# Patient Record
Sex: FEMALE | Race: WHITE | NOT HISPANIC OR LATINO | Employment: OTHER | URBAN - METROPOLITAN AREA
[De-identification: names, ages, dates, MRNs, and addresses within clinical notes are randomized per-mention and may not be internally consistent; named-entity substitution may affect disease eponyms.]

---

## 2023-12-31 ENCOUNTER — HOSPITAL ENCOUNTER (INPATIENT)
Facility: CLINIC | Age: 63
LOS: 5 days | Discharge: HOME | DRG: 330 | End: 2024-01-05
Attending: EMERGENCY MEDICINE | Admitting: INTERNAL MEDICINE
Payer: MEDICARE

## 2023-12-31 ENCOUNTER — APPOINTMENT (OUTPATIENT)
Dept: CT IMAGING | Facility: CLINIC | Age: 63
DRG: 330 | End: 2023-12-31
Attending: EMERGENCY MEDICINE
Payer: MEDICARE

## 2023-12-31 ENCOUNTER — APPOINTMENT (OUTPATIENT)
Dept: GENERAL RADIOLOGY | Facility: CLINIC | Age: 63
DRG: 330 | End: 2023-12-31
Attending: EMERGENCY MEDICINE
Payer: MEDICARE

## 2023-12-31 DIAGNOSIS — E27.9 ADRENAL NODULE (H): ICD-10-CM

## 2023-12-31 DIAGNOSIS — G89.18 POSTOPERATIVE PAIN: Primary | ICD-10-CM

## 2023-12-31 DIAGNOSIS — N63.20 MASS OF LEFT BREAST, UNSPECIFIED QUADRANT: ICD-10-CM

## 2023-12-31 DIAGNOSIS — I10 HYPERTENSION GOAL BP (BLOOD PRESSURE) < 140/80: ICD-10-CM

## 2023-12-31 DIAGNOSIS — K56.609 SBO (SMALL BOWEL OBSTRUCTION) (H): ICD-10-CM

## 2023-12-31 PROBLEM — E87.1 HYPONATREMIA: Status: ACTIVE | Noted: 2023-12-31

## 2023-12-31 PROBLEM — F17.200 TOBACCO USE DISORDER: Status: ACTIVE | Noted: 2023-12-31

## 2023-12-31 PROBLEM — D72.829 LEUKOCYTOSIS: Status: ACTIVE | Noted: 2023-12-31

## 2023-12-31 LAB
ALBUMIN SERPL BCG-MCNC: 4.5 G/DL (ref 3.5–5.2)
ALP SERPL-CCNC: 83 U/L (ref 40–150)
ALT SERPL W P-5'-P-CCNC: 23 U/L (ref 0–50)
ANION GAP SERPL CALCULATED.3IONS-SCNC: 19 MMOL/L (ref 7–15)
AST SERPL W P-5'-P-CCNC: 17 U/L (ref 0–45)
BASOPHILS # BLD AUTO: 0 10E3/UL (ref 0–0.2)
BASOPHILS NFR BLD AUTO: 0 %
BILIRUB SERPL-MCNC: 0.9 MG/DL
BUN SERPL-MCNC: 26.2 MG/DL (ref 8–23)
CALCIUM SERPL-MCNC: 9.7 MG/DL (ref 8.8–10.2)
CHLORIDE SERPL-SCNC: 96 MMOL/L (ref 98–107)
CREAT SERPL-MCNC: 0.92 MG/DL (ref 0.51–0.95)
DEPRECATED HCO3 PLAS-SCNC: 18 MMOL/L (ref 22–29)
EGFRCR SERPLBLD CKD-EPI 2021: 69 ML/MIN/1.73M2
EOSINOPHIL # BLD AUTO: 0 10E3/UL (ref 0–0.7)
EOSINOPHIL NFR BLD AUTO: 0 %
ERYTHROCYTE [DISTWIDTH] IN BLOOD BY AUTOMATED COUNT: 12.3 % (ref 10–15)
GLUCOSE SERPL-MCNC: 162 MG/DL (ref 70–99)
HCT VFR BLD AUTO: 47.3 % (ref 35–47)
HGB BLD-MCNC: 16.1 G/DL (ref 11.7–15.7)
IMM GRANULOCYTES # BLD: 0.2 10E3/UL
IMM GRANULOCYTES NFR BLD: 1 %
LACTATE SERPL-SCNC: 1.1 MMOL/L (ref 0.7–2)
LIPASE SERPL-CCNC: 18 U/L (ref 13–60)
LYMPHOCYTES # BLD AUTO: 2 10E3/UL (ref 0.8–5.3)
LYMPHOCYTES NFR BLD AUTO: 10 %
MAGNESIUM SERPL-MCNC: 2.2 MG/DL (ref 1.7–2.3)
MCH RBC QN AUTO: 32.9 PG (ref 26.5–33)
MCHC RBC AUTO-ENTMCNC: 34 G/DL (ref 31.5–36.5)
MCV RBC AUTO: 97 FL (ref 78–100)
MONOCYTES # BLD AUTO: 1 10E3/UL (ref 0–1.3)
MONOCYTES NFR BLD AUTO: 5 %
NEUTROPHILS # BLD AUTO: 16.3 10E3/UL (ref 1.6–8.3)
NEUTROPHILS NFR BLD AUTO: 84 %
NRBC # BLD AUTO: 0 10E3/UL
NRBC BLD AUTO-RTO: 0 /100
PLATELET # BLD AUTO: 255 10E3/UL (ref 150–450)
POTASSIUM SERPL-SCNC: 4.3 MMOL/L (ref 3.4–5.3)
PROT SERPL-MCNC: 8.3 G/DL (ref 6.4–8.3)
RBC # BLD AUTO: 4.9 10E6/UL (ref 3.8–5.2)
SODIUM SERPL-SCNC: 133 MMOL/L (ref 135–145)
WBC # BLD AUTO: 19.5 10E3/UL (ref 4–11)

## 2023-12-31 PROCEDURE — 258N000003 HC RX IP 258 OP 636: Performed by: NURSE PRACTITIONER

## 2023-12-31 PROCEDURE — 120N000001 HC R&B MED SURG/OB

## 2023-12-31 PROCEDURE — 999N000065 XR CHEST PORT 1 VIEW

## 2023-12-31 PROCEDURE — 83605 ASSAY OF LACTIC ACID: CPT | Performed by: EMERGENCY MEDICINE

## 2023-12-31 PROCEDURE — 83690 ASSAY OF LIPASE: CPT | Performed by: EMERGENCY MEDICINE

## 2023-12-31 PROCEDURE — 250N000011 HC RX IP 250 OP 636: Performed by: EMERGENCY MEDICINE

## 2023-12-31 PROCEDURE — 96375 TX/PRO/DX INJ NEW DRUG ADDON: CPT | Performed by: EMERGENCY MEDICINE

## 2023-12-31 PROCEDURE — 250N000011 HC RX IP 250 OP 636: Performed by: NURSE PRACTITIONER

## 2023-12-31 PROCEDURE — 85025 COMPLETE CBC W/AUTO DIFF WBC: CPT | Performed by: EMERGENCY MEDICINE

## 2023-12-31 PROCEDURE — 99222 1ST HOSP IP/OBS MODERATE 55: CPT | Performed by: SURGERY

## 2023-12-31 PROCEDURE — 96361 HYDRATE IV INFUSION ADD-ON: CPT | Performed by: EMERGENCY MEDICINE

## 2023-12-31 PROCEDURE — 96374 THER/PROPH/DIAG INJ IV PUSH: CPT | Mod: 59 | Performed by: EMERGENCY MEDICINE

## 2023-12-31 PROCEDURE — 83735 ASSAY OF MAGNESIUM: CPT | Performed by: NURSE PRACTITIONER

## 2023-12-31 PROCEDURE — 82040 ASSAY OF SERUM ALBUMIN: CPT | Performed by: EMERGENCY MEDICINE

## 2023-12-31 PROCEDURE — 250N000009 HC RX 250: Performed by: EMERGENCY MEDICINE

## 2023-12-31 PROCEDURE — 96376 TX/PRO/DX INJ SAME DRUG ADON: CPT | Performed by: EMERGENCY MEDICINE

## 2023-12-31 PROCEDURE — 99285 EMERGENCY DEPT VISIT HI MDM: CPT | Mod: 25 | Performed by: EMERGENCY MEDICINE

## 2023-12-31 PROCEDURE — 99285 EMERGENCY DEPT VISIT HI MDM: CPT | Performed by: EMERGENCY MEDICINE

## 2023-12-31 PROCEDURE — 36415 COLL VENOUS BLD VENIPUNCTURE: CPT | Performed by: EMERGENCY MEDICINE

## 2023-12-31 PROCEDURE — 258N000003 HC RX IP 258 OP 636: Performed by: EMERGENCY MEDICINE

## 2023-12-31 PROCEDURE — 99222 1ST HOSP IP/OBS MODERATE 55: CPT | Mod: AI | Performed by: NURSE PRACTITIONER

## 2023-12-31 PROCEDURE — 74177 CT ABD & PELVIS W/CONTRAST: CPT | Mod: MG

## 2023-12-31 RX ORDER — ONDANSETRON 2 MG/ML
4 INJECTION INTRAMUSCULAR; INTRAVENOUS EVERY 30 MIN PRN
Status: DISCONTINUED | OUTPATIENT
Start: 2023-12-31 | End: 2023-12-31

## 2023-12-31 RX ORDER — AMOXICILLIN 250 MG
2 CAPSULE ORAL 2 TIMES DAILY PRN
Status: DISCONTINUED | OUTPATIENT
Start: 2023-12-31 | End: 2024-01-02

## 2023-12-31 RX ORDER — PROCHLORPERAZINE MALEATE 5 MG
5 TABLET ORAL EVERY 6 HOURS PRN
Status: DISCONTINUED | OUTPATIENT
Start: 2023-12-31 | End: 2024-01-05 | Stop reason: HOSPADM

## 2023-12-31 RX ORDER — HYDRALAZINE HYDROCHLORIDE 10 MG/1
10 TABLET, FILM COATED ORAL EVERY 4 HOURS PRN
Status: DISCONTINUED | OUTPATIENT
Start: 2023-12-31 | End: 2024-01-02

## 2023-12-31 RX ORDER — ONDANSETRON 2 MG/ML
4 INJECTION INTRAMUSCULAR; INTRAVENOUS EVERY 6 HOURS PRN
Status: DISCONTINUED | OUTPATIENT
Start: 2023-12-31 | End: 2024-01-05 | Stop reason: HOSPADM

## 2023-12-31 RX ORDER — AMOXICILLIN 250 MG
1 CAPSULE ORAL 2 TIMES DAILY PRN
Status: DISCONTINUED | OUTPATIENT
Start: 2023-12-31 | End: 2024-01-02

## 2023-12-31 RX ORDER — HYDRALAZINE HYDROCHLORIDE 20 MG/ML
10 INJECTION INTRAMUSCULAR; INTRAVENOUS EVERY 4 HOURS PRN
Status: DISCONTINUED | OUTPATIENT
Start: 2023-12-31 | End: 2024-01-02

## 2023-12-31 RX ORDER — NALOXONE HYDROCHLORIDE 0.4 MG/ML
0.2 INJECTION, SOLUTION INTRAMUSCULAR; INTRAVENOUS; SUBCUTANEOUS
Status: DISCONTINUED | OUTPATIENT
Start: 2023-12-31 | End: 2024-01-05 | Stop reason: HOSPADM

## 2023-12-31 RX ORDER — CALCIUM CARBONATE 500 MG/1
1000 TABLET, CHEWABLE ORAL 4 TIMES DAILY PRN
Status: DISCONTINUED | OUTPATIENT
Start: 2023-12-31 | End: 2024-01-05 | Stop reason: HOSPADM

## 2023-12-31 RX ORDER — ENOXAPARIN SODIUM 100 MG/ML
40 INJECTION SUBCUTANEOUS EVERY 24 HOURS
Status: DISCONTINUED | OUTPATIENT
Start: 2023-12-31 | End: 2024-01-01

## 2023-12-31 RX ORDER — SODIUM CHLORIDE 9 MG/ML
INJECTION, SOLUTION INTRAVENOUS CONTINUOUS
Status: DISCONTINUED | OUTPATIENT
Start: 2023-12-31 | End: 2024-01-02

## 2023-12-31 RX ORDER — NALOXONE HYDROCHLORIDE 0.4 MG/ML
0.4 INJECTION, SOLUTION INTRAMUSCULAR; INTRAVENOUS; SUBCUTANEOUS
Status: DISCONTINUED | OUTPATIENT
Start: 2023-12-31 | End: 2024-01-05 | Stop reason: HOSPADM

## 2023-12-31 RX ORDER — HYDROMORPHONE HCL IN WATER/PF 6 MG/30 ML
0.2 PATIENT CONTROLLED ANALGESIA SYRINGE INTRAVENOUS
Status: DISCONTINUED | OUTPATIENT
Start: 2023-12-31 | End: 2024-01-02

## 2023-12-31 RX ORDER — LIDOCAINE 40 MG/G
CREAM TOPICAL
Status: DISCONTINUED | OUTPATIENT
Start: 2023-12-31 | End: 2024-01-02

## 2023-12-31 RX ORDER — PROCHLORPERAZINE 25 MG
12.5 SUPPOSITORY, RECTAL RECTAL EVERY 12 HOURS PRN
Status: DISCONTINUED | OUTPATIENT
Start: 2023-12-31 | End: 2024-01-05 | Stop reason: HOSPADM

## 2023-12-31 RX ORDER — ONDANSETRON 4 MG/1
4 TABLET, ORALLY DISINTEGRATING ORAL EVERY 6 HOURS PRN
Status: DISCONTINUED | OUTPATIENT
Start: 2023-12-31 | End: 2024-01-05 | Stop reason: HOSPADM

## 2023-12-31 RX ORDER — HYDROMORPHONE HYDROCHLORIDE 1 MG/ML
0.5 INJECTION, SOLUTION INTRAMUSCULAR; INTRAVENOUS; SUBCUTANEOUS EVERY 30 MIN PRN
Status: COMPLETED | OUTPATIENT
Start: 2023-12-31 | End: 2023-12-31

## 2023-12-31 RX ORDER — HYDROMORPHONE HCL IN WATER/PF 6 MG/30 ML
0.4 PATIENT CONTROLLED ANALGESIA SYRINGE INTRAVENOUS
Status: DISCONTINUED | OUTPATIENT
Start: 2023-12-31 | End: 2024-01-02

## 2023-12-31 RX ORDER — OXYMETAZOLINE HYDROCHLORIDE 0.05 G/100ML
2 SPRAY NASAL
Status: COMPLETED | OUTPATIENT
Start: 2023-12-31 | End: 2023-12-31

## 2023-12-31 RX ORDER — IOPAMIDOL 755 MG/ML
500 INJECTION, SOLUTION INTRAVASCULAR ONCE
Status: COMPLETED | OUTPATIENT
Start: 2023-12-31 | End: 2023-12-31

## 2023-12-31 RX ADMIN — HYDROMORPHONE HYDROCHLORIDE 0.5 MG: 1 INJECTION, SOLUTION INTRAMUSCULAR; INTRAVENOUS; SUBCUTANEOUS at 10:42

## 2023-12-31 RX ADMIN — IOPAMIDOL 55 ML: 755 INJECTION, SOLUTION INTRAVENOUS at 11:11

## 2023-12-31 RX ADMIN — SODIUM CHLORIDE: 9 INJECTION, SOLUTION INTRAVENOUS at 13:40

## 2023-12-31 RX ADMIN — SODIUM CHLORIDE 70 ML: 9 INJECTION, SOLUTION INTRAVENOUS at 11:11

## 2023-12-31 RX ADMIN — OXYMETAZOLINE HYDROCHLORIDE 2 SPRAY: 0.05 SPRAY NASAL at 13:00

## 2023-12-31 RX ADMIN — SODIUM CHLORIDE 1000 ML: 9 INJECTION, SOLUTION INTRAVENOUS at 10:40

## 2023-12-31 RX ADMIN — HYDROMORPHONE HYDROCHLORIDE 0.2 MG: 0.2 INJECTION, SOLUTION INTRAMUSCULAR; INTRAVENOUS; SUBCUTANEOUS at 22:53

## 2023-12-31 RX ADMIN — HYDROMORPHONE HYDROCHLORIDE 0.2 MG: 0.2 INJECTION, SOLUTION INTRAMUSCULAR; INTRAVENOUS; SUBCUTANEOUS at 18:02

## 2023-12-31 RX ADMIN — SODIUM CHLORIDE 1000 ML: 9 INJECTION, SOLUTION INTRAVENOUS at 12:42

## 2023-12-31 RX ADMIN — ONDANSETRON 4 MG: 2 INJECTION INTRAMUSCULAR; INTRAVENOUS at 10:43

## 2023-12-31 RX ADMIN — HYDROMORPHONE HYDROCHLORIDE 0.5 MG: 1 INJECTION, SOLUTION INTRAMUSCULAR; INTRAVENOUS; SUBCUTANEOUS at 15:04

## 2023-12-31 RX ADMIN — SODIUM CHLORIDE: 9 INJECTION, SOLUTION INTRAVENOUS at 22:46

## 2023-12-31 RX ADMIN — ENOXAPARIN SODIUM 40 MG: 40 INJECTION SUBCUTANEOUS at 16:44

## 2023-12-31 RX ADMIN — HYDROMORPHONE HYDROCHLORIDE 0.5 MG: 1 INJECTION, SOLUTION INTRAMUSCULAR; INTRAVENOUS; SUBCUTANEOUS at 12:59

## 2023-12-31 ASSESSMENT — ACTIVITIES OF DAILY LIVING (ADL)
ADLS_ACUITY_SCORE: 35
ADLS_ACUITY_SCORE: 18
ADLS_ACUITY_SCORE: 18
ADLS_ACUITY_SCORE: 35
ADLS_ACUITY_SCORE: 18
ADLS_ACUITY_SCORE: 35
ADLS_ACUITY_SCORE: 18

## 2023-12-31 NOTE — H&P
[facility] ED    History and Physical - Hospitalist Service       Date of Admission:  12/31/2023    Assessment & Plan      Jinny Smith is a 63 year old female admitted on 12/31/2023. She has a past medical history of hypertension, hyperlipidemia-although has not been on medications for several years.  Also has a history of hysterectomy.    She presented to the ED on 12/31/2023 with 4-day history of abdominal pain, cramping.  She had only taken sips of water since 12/28, but then this morning started vomiting.  Last bowel movement Friday 12/29 and it was normal.    Workup in the ED included CT of abdomen and pelvis.  Showing acute mechanical small bowel obstruction with transition point in the right lower quadrant pelvis.  Also noted to have indeterminate left adrenal nodule and indeterminate left anterior breast mass.  Lab work indicated mild hyponatremia with sodium of 133, elevated anion gap at 19, BUN elevated at 26.2, elevated WBC at 19.5, with an elevated hemoglobin of 16.1.  Lactic acid normal at 1.1    She is being admitted as an inpatient due to an acute small bowel obstruction.    SBO (small bowel obstruction) (H)    Assessment: Scented with a 4-day history of abdominal pain and cramping.  Gradually worsening.  With vomiting this morning.  CT of abdomen and pelvis indicates acute mechanical bowel obstruction.  Surgery consulted by the ED provider.  NG tube placed, recommend n.p.o.  With surgery to follow-up    Plan:   -N.p.o.  -IV normal saline at 125 cc an hour  -NG tube to low intermittent suction  -Surgical consult  -Hydromorphone as needed for pain  -Diabetics for nausea and vomiting  Hypertension goal BP (blood pressure) < 140/80    Assessment: Has a history of hypertension, but currently not taking any medications.  Has not followed up with primary care provider for several years.  Initially in the ED blood pressure elevated at 179/123, with an elevated pulse.  With  IV fluids and pain management and this has improved to 142/69, pulse 84.    Plan: Monitor blood pressure  Hydralazine as needed for systolic blood pressure greater than 180  H/O: hysterectomy    Assessment: History of hysterectomy    Tobacco use disorder    Assessment: Patient reports she smokes a half a pack or less of cigarettes a day.  She has not smoked since 12/28.  Has not noted any symptoms of acute nicotine withdrawal    Plan: Continue to monitor, consider nicotine replacement if needed    Leukocytosis    Assessment: On admission WBC 19.5.  Patient has been afebrile without any other signs or symptoms of acute infection.  Suspect secondary to small bowel obstruction    Plan:   -Monitor for signs and symptoms of infection  -Recheck WBC in morning    Hyponatremia    Assessment: Sodium 134 on admission  -Likely hypovolemic  -Start normal saline IV fluids  -Repeat sodium in AM  -If not improving consider serum osmolality and urine sodium  Left adrenal nodule  Left anterior breast mass  Assessment: Did on CT scan  Plan: Follow-up as outpatient        Diet:  NPO  DVT Prophylaxis: Enoxaparin (Lovenox) SQ  Hanna Catheter: Not present  Lines: None     Cardiac Monitoring: None  Code Status:  Full Code    Clinically Significant Risk Factors Present on Admission             # Anion Gap Metabolic Acidosis: Highest Anion Gap = 19 mmol/L in last 2 days, will monitor and treat as appropriate      # Hypertension: Noted on problem list                 Disposition Plan      Expected Discharge Date: 01/02/2024                The patient's care was discussed with the Attending Physician, Dr. Patel and Patient.    Lelia Farr CNP  Hospitalist Service  [facility]  Securely message with [employer] (more info)  Text page via [facility] Paging/Directory     ______________________________________________________________________    Chief Complaint   Acute abdominal pain with vomiting    History is obtained from  the patient    History of Present Illness   Jinny Smith is a 63 year old female admitted on 12/31/2023. She has a past medical history of hypertension, hyperlipidemia-although has not been on medications for several years.  Also has a history of hysterectomy.    She presented to the ED on 12/31/2023 with 4-day history of abdominal pain, cramping.  She had only taken sips of water since 12/28, but then this morning started vomiting.  Last bowel movement Friday 12/29 and it was normal.    Past Medical History    Past Medical History:   Diagnosis Date    Family history of colon cancer     Hyperlipidemia LDL goal <130 8/28/2012    Hyperplastic colonic polyp     Hypertension goal BP (blood pressure) < 140/80 8/28/2012    Knee joint effusion 8/28/2012       Past Surgical History   Past Surgical History:   Procedure Laterality Date    GYN SURGERY      HYSTERECTOMY TOTAL ABDOMINAL         Prior to Admission Medications   None        Social History   I have reviewed this patient's social history and updated it with pertinent information if needed.  Social History     Tobacco Use    Smoking status: Every Day     Packs/day: .5     Types: Cigarettes    Smokeless tobacco: Former     Quit date: 1/12/2013   Substance Use Topics    Alcohol use: Yes    Drug use: No   Drinks 2-3 vodka drinks about 5 nights a week.  None since 12/28/23      Allergies   No Known Allergies     Physical Exam   Vital Signs: Temp: 98  F (36.7  C) Temp src: Oral BP: (!) 142/69 Pulse: 84   Resp: 22 SpO2: 93 %      Weight: 165 lbs 0 oz    General Appearance: Sitting up on gurney.  Alert and oriented no acute distress  Eyes: Nonicteric sclera clear, EOMs intact  HEENT: Head normocephalic no acute inflammation of ears nose, throat  Respiratory: Respiratory effort easy at rest, no cough.  Lung sounds clear  Cardiovascular: Normal S1-S2, no murmur  GI: Abdomen appears nondistended, no bowel sounds noted.  Soft with mild tenderness throughout with  palpation  Lymph/Hematologic: No lymphadenopathy noted neck chest and axillary  Genitourinary: Deferred  Skin: Warm and dry, on gross examination no rashes noted  Musculoskeletal: Good muscle tone, able to independently move upper and lower extremities  Neurologic: Nonfocal neurological exam, alert and oriented  Psychiatric: Makes good eye contact mood appropriate    Medical Decision Making       60 MINUTES SPENT BY ME on the date of service doing chart review, history, exam, documentation & further activities per the note.      Data     I have personally reviewed the following data over the past 24 hrs:    19.5 (H)  \   16.1 (H)   / 255     133 (L) 96 (L) 26.2 (H) /  162 (H)   4.3 18 (L) 0.92 \     ALT: 23 AST: 17 AP: 83 TBILI: 0.9   ALB: 4.5 TOT PROTEIN: 8.3 LIPASE: 18     Procal: N/A CRP: N/A Lactic Acid: 1.1         Imaging results reviewed over the past 24 hrs:   Recent Results (from the past 24 hour(s))   CT Abdomen Pelvis w Contrast    Narrative    EXAM: CT ABDOMEN PELVIS W CONTRAST  LOCATION: [facility]  DATE: 12/31/2023    INDICATION: Abdominal pain, nausea, vomiting.  History of hysterectomy.  No bowel movement x 2 days.  Not passing gas.  Concern for obstruction  COMPARISON: Screening mammogram from 11/21/2013.  TECHNIQUE: CT scan of the abdomen and pelvis was performed following injection of IV contrast. Multiplanar reformats were obtained. Dose reduction techniques were used.  CONTRAST: 55 mL of Isovue-370    FINDINGS:   LOWER CHEST: Mild elevation the right hemidiaphragm. Coronary atherosclerosis. There is a breast mass in the left inferior breast at approximately the 6:00 position measuring 1.5 x 1.1 cm on series 3 image 10.    HEPATOBILIARY: Hepatic steatosis. Small volume biliary sludge. No biliary ductal dilation.    PANCREAS: Normal.    SPLEEN: Normal.    ADRENAL GLANDS: Indeterminant 1.1 cm left adrenal nodule    KIDNEYS/BLADDER: No significant mass, stone, or  hydronephrosis.    BOWEL: There are numerous fluid-filled dilated loops of small bowel with a transition point in the right lower quadrant pelvis on series 4 image 60 and 3 image 177. There is mild mesenteric engorgement. Small volume reactive ascites. No free air or   pneumatosis. Diverticulosis. No evidence of appendicitis.    LYMPH NODES: Normal.    VASCULATURE: No abdominal aortic aneurysm.    PELVIC ORGANS: Hysterectomy.    MUSCULOSKELETAL: Degenerative changes of the spine.      Impression    IMPRESSION:   1.  Acute mechanical small bowel obstruction with transition point in the right lower quadrant pelvis.  2.  Indeterminate left adrenal nodule. Follow-up with nonemergent outpatient adrenal mass CT.  3.  Indeterminant left anterior breast mass at approximately the 6:00 position. Recommend follow-up with nonemergent outpatient breast imaging.  4.  Hepatic steatosis.   X-ray Chest 1 vw port    Narrative    EXAM: XR CHEST PORT 1 VIEW  LOCATION: [facility]  DATE: 12/31/2023    INDICATION: NG placement  COMPARISON: CT abdomen pelvis 12/31/2023.      Impression    IMPRESSION: Nasogastric tube tip in the stomach. Mild atelectasis at the left base. No pneumothorax or definite pleural effusion. Normal heart size.

## 2023-12-31 NOTE — PROGRESS NOTES
S-(situation): Patient arrives to room 255 via cart from ED    B-(background): Pt came in with N-V. Found to have a bowel obstruction.    A-(assessment): Pt is A&Ox4. VSS on RA. Pt is up with SBA and is steady. Pt reports pain as tolerable at this time. Pt has hypoactive bowel sounds on left side and absent on right side. Pt denies passing flatus and reports last BM as 12/29. Lung sounds are clear throughout.     R-(recommendations): Orders reviewed with Patient. Will monitor patient per MD orders.     Inpatient nursing criteria listed below were met:    Health care directives status obtained and documented: Yes  VTE ordered/documented: Yes  Skin issues/needs documented:NA  Isolation addressed and Signage used: NA  Fall Prevention: Care plan updated Yes Education given and documented Yes  Care Plan initiated and Co-Morbidities added: Yes  Education Assessment documented:Yes  Admission Education Documented: Yes  If present CAUTI/CLABI Education done: NA  New medication patient education completed and documented (Possible Side Effects of Common Medications handout): Yes  Allergies Reviewed: Yes  Admission Medication Reconciliation completed: Yes  Home medications if not able to send immediately home with family stored here: NA  Reminder note placed in discharge instructions regarding home meds: NA  Individualized care needs/preferences addressed and charted: Yes  Provider Notified that patient has arrived to the unit: Yes

## 2023-12-31 NOTE — CONSULTS
Surgical Consultation/History and Physical  [facility] General Surgery    Jinny is seen in consultation for abdominal pain    Chief Complaint:  Abdominal pain    History of Present Illness: Jinny Smith is a 63 year old female presents with abdominal pain.  Patient presents with 3 day history of abdominal pain, distension, obstipation.  She has never had this prior.  States she was having generalized cramping with maximal pain at 8/10.  It has improved significantly with pain medications on evaluation and notes significant improvement in cramping s/p NGT placement.  She has never had a small bowel obstruction prior.  She has had 3 separate gynecologic procedures, all performed open (Sequential, Oophorectomy followed by [facility]).  She does not follow with any physicians currently.  Last bowel movement was 2 days ago and normal per patient.      Patient Active Problem List   Diagnosis    Hypertension goal BP (blood pressure) < 140/80    Hyperlipidemia LDL goal <130    Knee joint effusion    Advanced directives, counseling/discussion    H/O: hysterectomy    Generalized anxiety disorder    SBO (small bowel obstruction) (H)    Tobacco use disorder    Leukocytosis    Hyponatremia     Past Medical History:   Diagnosis Date    Family history of colon cancer     Hyperlipidemia LDL goal <130 8/28/2012    Hyperplastic colonic polyp     Hypertension goal BP (blood pressure) < 140/80 8/28/2012    Knee joint effusion 8/28/2012       Past Surgical History:   Procedure Laterality Date    GYN SURGERY      HYSTERECTOMY TOTAL ABDOMINAL       History reviewed. No pertinent family history.    Social History     Tobacco Use    Smoking status: Every Day     Packs/day: .5     Types: Cigarettes    Smokeless tobacco: Former     Quit date: 1/12/2013   Substance Use Topics    Alcohol use: Yes     History   Drug Use No     No current outpatient medications on file.     No Known Allergies    Review of Systems:   10 point ROS otherwise  negative    Physical Exam:  BP (!) 144/79   Pulse 88   Temp 98  F (36.7  C) (Oral)   Resp 16   Wt 74.8 kg (165 lb)   SpO2 95%   BMI 28.32 kg/m      Constitutional- No acute distress, well nourished, non-toxic  Eyes: Anicteric, no injection.  PERRL  ENT:  Normocephalic, atraumatic, Nose midline, moist mucus membranes  Neck - supple, no LAD, Thyroid smooth  Respiratory- Clear to auscultation bilaterally, good inspiratory effort  Cardiovascular - Heart RRR, no lift's, thrills, murmurs, rubs, or gallop.  No peripheral edema.  No clubbing.  Abdomen - Soft, obese, mild distension, mild TTP Right mid abdomen without R/R/G, +BS, no hepatosplenomegaly, no palpable masses  Neuro - No focal neuro deficits, Alert and oriented x 3  Psych: Appropriate mood and affect  Musculoskeletal: Normal gait, symmetric strength.  FROM upper and lower extremities.  Skin: Warm, Dry    Lab Results   Component Value Date    WBC 19.5 12/31/2023     Lab Results   Component Value Date    RBC 4.90 12/31/2023     Lab Results   Component Value Date    HGB 16.1 12/31/2023     Lab Results   Component Value Date    HCT 47.3 12/31/2023     Lab Results   Component Value Date    MCV 97 12/31/2023     Lab Results   Component Value Date    MCH 32.9 12/31/2023     Lab Results   Component Value Date    MCHC 34.0 12/31/2023     Lab Results   Component Value Date    RDW 12.3 12/31/2023     Lab Results   Component Value Date     12/31/2023     Last Comprehensive Metabolic Panel:  Sodium   Date Value Ref Range Status   12/31/2023 133 (L) 135 - 145 mmol/L Final     Comment:     Reference intervals for this test were updated on 09/26/2023 to more accurately reflect our healthy population. There may be differences in the flagging of prior results with similar values performed with this method. Interpretation of those prior results can be made in the context of the updated reference intervals.    09/11/2014 139 133 - 144 mmol/L Final     Potassium   Date  Value Ref Range Status   12/31/2023 4.3 3.4 - 5.3 mmol/L Final   09/11/2014 4.0 3.4 - 5.3 mmol/L Final     Chloride   Date Value Ref Range Status   12/31/2023 96 (L) 98 - 107 mmol/L Final   09/11/2014 105 94 - 109 mmol/L Final     Carbon Dioxide   Date Value Ref Range Status   09/11/2014 23 20 - 32 mmol/L Final     Carbon Dioxide (CO2)   Date Value Ref Range Status   12/31/2023 18 (L) 22 - 29 mmol/L Final     Anion Gap   Date Value Ref Range Status   12/31/2023 19 (H) 7 - 15 mmol/L Final   09/11/2014 11 6 - 17 mmol/L Final     Glucose   Date Value Ref Range Status   12/31/2023 162 (H) 70 - 99 mg/dL Final   09/11/2014 115 (H) 70 - 99 mg/dL Final     Comment:     Effective 7/30/2014, the reference range for this assay has changed to reflect   new instrumentation/methodology.       Urea Nitrogen   Date Value Ref Range Status   12/31/2023 26.2 (H) 8.0 - 23.0 mg/dL Final   09/11/2014 18 7 - 30 mg/dL Final     Comment:     Effective 7/30/2014, the reference range for this assay has changed to reflect   new instrumentation/methodology.       Creatinine   Date Value Ref Range Status   12/31/2023 0.92 0.51 - 0.95 mg/dL Final   09/11/2014 0.68 0.52 - 1.04 mg/dL Final     GFR Estimate   Date Value Ref Range Status   12/31/2023 69 >60 mL/min/1.73m2 Final   09/11/2014 89 >60 mL/min/1.7m2 Final     Comment:     Non  GFR Calc     Calcium   Date Value Ref Range Status   12/31/2023 9.7 8.8 - 10.2 mg/dL Final   09/11/2014 9.4 8.5 - 10.1 mg/dL Final     Comment:     Effective 7/30/2014, the reference range for this assay has changed to reflect   new instrumentation/methodology.       Bilirubin Total   Date Value Ref Range Status   12/31/2023 0.9 <=1.2 mg/dL Final     Alkaline Phosphatase   Date Value Ref Range Status   12/31/2023 83 40 - 150 U/L Final     Comment:     Reference intervals for this test were updated on 11/14/2023 to more accurately reflect our healthy population. There may be differences in the flagging  of prior results with similar values performed with this method. Interpretation of those prior results can be made in the context of the updated reference intervals.     ALT   Date Value Ref Range Status   12/31/2023 23 0 - 50 U/L Final     Comment:     Reference intervals for this test were updated on 6/12/2023 to more accurately reflect our healthy population. There may be differences in the flagging of prior results with similar values performed with this method. Interpretation of those prior results can be made in the context of the updated reference intervals.     09/11/2014 39 0 - 50 U/L Final     AST   Date Value Ref Range Status   12/31/2023 17 0 - 45 U/L Final     Comment:     Reference intervals for this test were updated on 6/12/2023 to more accurately reflect our healthy population. There may be differences in the flagging of prior results with similar values performed with this method. Interpretation of those prior results can be made in the context of the updated reference intervals.     Lactic acid: 1.1    CT Abdomen/Pelvis:  Narrative & Impression   EXAM: CT ABDOMEN PELVIS W CONTRAST  LOCATION: [facility]  DATE: 12/31/2023     INDICATION: Abdominal pain, nausea, vomiting.  History of hysterectomy.  No bowel movement x 2 days.  Not passing gas.  Concern for obstruction  COMPARISON: Screening mammogram from 11/21/2013.  TECHNIQUE: CT scan of the abdomen and pelvis was performed following injection of IV contrast. Multiplanar reformats were obtained. Dose reduction techniques were used.  CONTRAST: 55 mL of Isovue-370     FINDINGS:   LOWER CHEST: Mild elevation the right hemidiaphragm. Coronary atherosclerosis. There is a breast mass in the left inferior breast at approximately the 6:00 position measuring 1.5 x 1.1 cm on series 3 image 10.     HEPATOBILIARY: Hepatic steatosis. Small volume biliary sludge. No biliary ductal dilation.     PANCREAS: Normal.     SPLEEN:  Normal.     ADRENAL GLANDS: Indeterminant 1.1 cm left adrenal nodule     KIDNEYS/BLADDER: No significant mass, stone, or hydronephrosis.     BOWEL: There are numerous fluid-filled dilated loops of small bowel with a transition point in the right lower quadrant pelvis on series 4 image 60 and 3 image 177. There is mild mesenteric engorgement. Small volume reactive ascites. No free air or   pneumatosis. Diverticulosis. No evidence of appendicitis.     LYMPH NODES: Normal.     VASCULATURE: No abdominal aortic aneurysm.     PELVIC ORGANS: Hysterectomy.     MUSCULOSKELETAL: Degenerative changes of the spine.                                                                      IMPRESSION:   1.  Acute mechanical small bowel obstruction with transition point in the right lower quadrant pelvis.  2.  Indeterminate left adrenal nodule. Follow-up with nonemergent outpatient adrenal mass CT.  3.  Indeterminant left anterior breast mass at approximately the 6:00 position. Recommend follow-up with nonemergent outpatient breast imaging.  4.  Hepatic steatosis.     Assessment:  1. SBO  2. Dehydration  3. Tobacco dependence  4. Hypertension    Plan:   Ms. Smith presents with a small bowel obstruction.  This is her first episode.  I reviewed her labs, imaging and performed an independent history/physical.  On initial presentation patient was quite tachycardic and hypertensive, this has resolved with fluid resuscitation.  Her exam is as expected and non-peritoneal without concerning CT findings.  Her obstruction is likely related to her previous surgery.  I discussed the nature of small bowel obstructions and management. At present would recommend NG decompression, KUB in AM to assess for improvement/resolution.  Gastrografin may be indicated and I discussed both the therapeutic and diagnostic indication for gastrografin challenge with the patient.  I reviewed the risks including aspiration/pneumonitis with patient, she is agreeable  should this be seen as necessary.  All questions answered to best of my ability.  [facility] hospitalist.    Karlo Lord, DO on 12/31/2023 at 3:32 PM

## 2024-01-01 ENCOUNTER — APPOINTMENT (OUTPATIENT)
Dept: GENERAL RADIOLOGY | Facility: CLINIC | Age: 64
DRG: 330 | End: 2024-01-01
Attending: SURGERY
Payer: MEDICARE

## 2024-01-01 ENCOUNTER — APPOINTMENT (OUTPATIENT)
Dept: GENERAL RADIOLOGY | Facility: CLINIC | Age: 64
DRG: 330 | End: 2024-01-01
Attending: NURSE PRACTITIONER
Payer: MEDICARE

## 2024-01-01 LAB
ANION GAP SERPL CALCULATED.3IONS-SCNC: 15 MMOL/L (ref 7–15)
BUN SERPL-MCNC: 24.3 MG/DL (ref 8–23)
CALCIUM SERPL-MCNC: 8.6 MG/DL (ref 8.8–10.2)
CHLORIDE SERPL-SCNC: 104 MMOL/L (ref 98–107)
CREAT SERPL-MCNC: 0.71 MG/DL (ref 0.51–0.95)
DEPRECATED HCO3 PLAS-SCNC: 23 MMOL/L (ref 22–29)
EGFRCR SERPLBLD CKD-EPI 2021: >90 ML/MIN/1.73M2
ERYTHROCYTE [DISTWIDTH] IN BLOOD BY AUTOMATED COUNT: 12.4 % (ref 10–15)
GLUCOSE SERPL-MCNC: 81 MG/DL (ref 70–99)
HCT VFR BLD AUTO: 39.7 % (ref 35–47)
HGB BLD-MCNC: 12.7 G/DL (ref 11.7–15.7)
MAGNESIUM SERPL-MCNC: 2.2 MG/DL (ref 1.7–2.3)
MCH RBC QN AUTO: 31.9 PG (ref 26.5–33)
MCHC RBC AUTO-ENTMCNC: 32 G/DL (ref 31.5–36.5)
MCV RBC AUTO: 100 FL (ref 78–100)
PLATELET # BLD AUTO: 187 10E3/UL (ref 150–450)
POTASSIUM SERPL-SCNC: 3.6 MMOL/L (ref 3.4–5.3)
RBC # BLD AUTO: 3.98 10E6/UL (ref 3.8–5.2)
SODIUM SERPL-SCNC: 142 MMOL/L (ref 135–145)
WBC # BLD AUTO: 10.5 10E3/UL (ref 4–11)

## 2024-01-01 PROCEDURE — 74018 RADEX ABDOMEN 1 VIEW: CPT

## 2024-01-01 PROCEDURE — 250N000011 HC RX IP 250 OP 636: Performed by: NURSE PRACTITIONER

## 2024-01-01 PROCEDURE — 80048 BASIC METABOLIC PNL TOTAL CA: CPT | Performed by: NURSE PRACTITIONER

## 2024-01-01 PROCEDURE — 85027 COMPLETE CBC AUTOMATED: CPT | Performed by: NURSE PRACTITIONER

## 2024-01-01 PROCEDURE — 258N000003 HC RX IP 258 OP 636: Performed by: NURSE PRACTITIONER

## 2024-01-01 PROCEDURE — 74018 RADEX ABDOMEN 1 VIEW: CPT | Mod: 77

## 2024-01-01 PROCEDURE — 83735 ASSAY OF MAGNESIUM: CPT | Performed by: NURSE PRACTITIONER

## 2024-01-01 PROCEDURE — 120N000001 HC R&B MED SURG/OB

## 2024-01-01 PROCEDURE — G0008 ADMIN INFLUENZA VIRUS VAC: HCPCS | Performed by: NURSE PRACTITIONER

## 2024-01-01 PROCEDURE — 99232 SBSQ HOSP IP/OBS MODERATE 35: CPT | Performed by: NURSE PRACTITIONER

## 2024-01-01 PROCEDURE — 90662 IIV NO PRSV INCREASED AG IM: CPT | Performed by: NURSE PRACTITIONER

## 2024-01-01 PROCEDURE — 36415 COLL VENOUS BLD VENIPUNCTURE: CPT | Performed by: NURSE PRACTITIONER

## 2024-01-01 RX ADMIN — HYDROMORPHONE HYDROCHLORIDE 0.2 MG: 0.2 INJECTION, SOLUTION INTRAMUSCULAR; INTRAVENOUS; SUBCUTANEOUS at 09:48

## 2024-01-01 RX ADMIN — HYDROMORPHONE HYDROCHLORIDE 0.2 MG: 0.2 INJECTION, SOLUTION INTRAMUSCULAR; INTRAVENOUS; SUBCUTANEOUS at 15:26

## 2024-01-01 RX ADMIN — HYDROMORPHONE HYDROCHLORIDE 0.2 MG: 0.2 INJECTION, SOLUTION INTRAMUSCULAR; INTRAVENOUS; SUBCUTANEOUS at 22:49

## 2024-01-01 RX ADMIN — ONDANSETRON 4 MG: 2 INJECTION INTRAMUSCULAR; INTRAVENOUS at 09:47

## 2024-01-01 RX ADMIN — INFLUENZA A VIRUS A/VICTORIA/4897/2022 IVR-238 (H1N1) ANTIGEN (FORMALDEHYDE INACTIVATED), INFLUENZA A VIRUS A/DARWIN/9/2021 SAN-010 (H3N2) ANTIGEN (FORMALDEHYDE INACTIVATED), INFLUENZA B VIRUS B/PHUKET/3073/2013 ANTIGEN (FORMALDEHYDE INACTIVATED), AND INFLUENZA B VIRUS B/MICHIGAN/01/2021 ANTIGEN (FORMALDEHYDE INACTIVATED) 0.7 ML: 60; 60; 60; 60 INJECTION, SUSPENSION INTRAMUSCULAR at 10:25

## 2024-01-01 RX ADMIN — DIATRIZOATE MEGLUMINE AND DIATRIZOATE SODIUM 120 ML: 660; 100 SOLUTION ORAL; RECTAL at 10:24

## 2024-01-01 RX ADMIN — SODIUM CHLORIDE: 9 INJECTION, SOLUTION INTRAVENOUS at 07:05

## 2024-01-01 RX ADMIN — SODIUM CHLORIDE: 9 INJECTION, SOLUTION INTRAVENOUS at 22:49

## 2024-01-01 RX ADMIN — SODIUM CHLORIDE: 9 INJECTION, SOLUTION INTRAVENOUS at 15:07

## 2024-01-01 ASSESSMENT — ACTIVITIES OF DAILY LIVING (ADL)
ADLS_ACUITY_SCORE: 18

## 2024-01-01 NOTE — PLAN OF CARE
Goal Outcome Evaluation:      Plan of Care Reviewed With: patient, spouse    Overall Patient Progress: no changeOverall Patient Progress: no change    Outcome Evaluation: Patient is A&Ox4. VSS on RA. Pt reports abdominal pain that radiates to back and took one dose of IV dilaudid 0.2mg x1 so far this shift. Pt is up with SBA and is steady. Pt denies nausea at this time and has not had any emesis. Bowel sounds are hypoactive on left and not heard on right. Pt has surgical consult in ED. Pt reports being a smoke but declines need for nicotine patch. Pt has NG in 60 at nare on LIS currently has had 200 mL out since admit to med surg. K 4.3 Mag 2.2 protocol ran both to be rechecked in AM. NS infusing at 125 mL/hr.

## 2024-01-01 NOTE — OP NOTE
Subjective:   Seen and examined.  No acute events.  Patient had increased bowel sounds overnight, resolution of distension and minimal pain. Patient had gastrografin initiated, noted increased bloating and discomfort during clamp period.  She has not passed any flatus.  Her pain has resolved with resumption of suction.      I/O last 3 completed shifts:  In: 249 [I.V.:249]  Out: 1900 [Emesis/NG output:1900]     No current outpatient medications on file.         ROUTINE IP LABS (Last four results)  BMP  Recent Labs   Lab 01/01/24  0550 12/31/23  1040    133*   POTASSIUM 3.6 4.3   CHLORIDE 104 96*   KAREN 8.6* 9.7   CO2 23 18*   BUN 24.3* 26.2*   CR 0.71 0.92   GLC 81 162*     CBC  Recent Labs   Lab 01/01/24  0550 12/31/23  1040   WBC 10.5 19.5*   RBC 3.98 4.90   HGB 12.7 16.1*   HCT 39.7 47.3*    97   MCH 31.9 32.9   MCHC 32.0 34.0   RDW 12.4 12.3    255     INRNo lab results found in last 7 days.         Tmax: 98.6  --->  Tcurrent: 98.6   B/P: 173/77  P: 74  R: 20  SpO2:91% RA        EXAM  AOX4 NAD  Moderate distension.  Mild TTP Mid abdomen without R/R/G  No CCE        A/P:   SBO  -  Resolution of leukocytosis and had improvement overnight  - Continue LIWS  - Awaiting first x-ray from gastrograffin  - Given abdominal distension currently, would want further decompression prior to surgery.  - Discussed with patient  - Continue ambulation    Karlo Lord,  on 1/1/2024 at 1:43 PM

## 2024-01-01 NOTE — ED PROVIDER NOTES
History     Chief Complaint   Patient presents with    Abdominal Pain     HPI  History per patient, medical records    This is a 63-year-old female presenting with abdominal pain.  Patient started having cramping abdominal pain that woke her from sleep at about 4 AM on Friday, 2 days ago.  She notes pain comes in waves.  She has not had anything to eat since prior to the onset of symptoms.  She has been trying to drink water.  This morning she developed nausea and had a number of episodes of vomiting.  She has felt hot, no specific fevers.  She has not had a bowel movement for 2 days and has also not been passing any gas.  She feels bloated/distended.  Patient has history of hysterectomy, no other abdominal surgeries.  She has not seen a doctor for many years, last colonoscopy was in 2013.  She is not on any current medications.  She denies any URI symptoms, cough, chest pain, shortness of breath.  She does feel some reflux-like symptoms.  She has had decreased urine output.    Allergies:  No Known Allergies    Problem List:    Patient Active Problem List    Diagnosis Date Noted    SBO (small bowel obstruction) (H) 12/31/2023     Priority: Medium    Tobacco use disorder 12/31/2023     Priority: Medium    Leukocytosis 12/31/2023     Priority: Medium    Hyponatremia 12/31/2023     Priority: Medium    Generalized anxiety disorder 09/10/2013     Priority: Medium     Diagnosis updated by automated process. Provider to review and confirm.      H/O: hysterectomy 07/02/2013     Priority: Medium    Advanced directives, counseling/discussion 06/25/2013     Priority: Medium    Hypertension goal BP (blood pressure) < 140/80 08/28/2012     Priority: Medium    Hyperlipidemia LDL goal <130 08/28/2012     Priority: Medium    Knee joint effusion 08/28/2012     Priority: Medium        Past Medical History:    Past Medical History:   Diagnosis Date    Family history of colon cancer     Hyperlipidemia LDL goal <130 8/28/2012     "Hyperplastic colonic polyp     Hypertension goal BP (blood pressure) < 140/80 8/28/2012    Knee joint effusion 8/28/2012       Past Surgical History:    Past Surgical History:   Procedure Laterality Date    GYN SURGERY      HYSTERECTOMY TOTAL ABDOMINAL         Family History:    History reviewed. No pertinent family history.    Social History:  Marital Status:   [2]  Social History     Tobacco Use    Smoking status: Every Day     Packs/day: .5     Types: Cigarettes    Smokeless tobacco: Former     Quit date: 1/12/2013   Substance Use Topics    Alcohol use: Yes    Drug use: No        Medications:    No current outpatient medications on file.        Review of Systems  All other ROS reviewed and are negative or non-contributory except as stated in HPI.     Physical Exam   BP: (!) 179/123  Pulse: (!) 129  Temp: 98  F (36.7  C)  Resp: 22  Height: 160 cm (5' 3\")  Weight: 74.8 kg (165 lb)  SpO2: 93 %      Physical Exam  Vitals and nursing note reviewed.   Constitutional:       Appearance: She is obese.      Comments: Pleasant, older, uncomfortable appearing female sitting on the edge of the bed   HENT:      Head: Normocephalic.      Nose: Nose normal.      Mouth/Throat:      Comments: Dry lips, tacky mucous membranes  Eyes:      General: No scleral icterus.     Extraocular Movements: Extraocular movements intact.      Conjunctiva/sclera: Conjunctivae normal.   Cardiovascular:      Rate and Rhythm: Regular rhythm. Tachycardia present.      Pulses: Normal pulses.      Heart sounds: Normal heart sounds.   Pulmonary:      Effort: Pulmonary effort is normal.      Breath sounds: Normal breath sounds.   Abdominal:      Comments: Tenderness throughout abdomen, especially upper abdomen.  Mildly distended.  No rebound   Musculoskeletal:         General: Normal range of motion.      Cervical back: Normal range of motion and neck supple.   Skin:     General: Skin is warm and dry.      Coloration: Skin is not pale.      " Findings: No rash.   Neurological:      General: No focal deficit present.      Mental Status: She is alert and oriented to person, place, and time.   Psychiatric:         Mood and Affect: Mood normal.         Behavior: Behavior normal.         ED Course (with Medical Decision Making)    Pt seen and examined by me.  RN and EPIC notes reviewed.       Patient with abdominal pain, nausea, vomiting.  She has not had a stool for 2 days.  Some decreased urine output.  Remote surgical history of hysterectomy.    While this may be some sort of gastroenteritis, with the fact she is not passing gas and has had the cramping pain I am more concerned for possible bowel obstruction.  Plan IV, fluids, labs, CT scan of the abdomen, nausea and pain medications.    CMP shows elevated BUN/creatinine at 26.2/0.92.  Sodium slightly low at 133.  She has a small anion gap of 19.  LFTs normal.  Lipase normal.  Significantly elevated white blood cell count at 19.5 with left shift.    CT scan shows an acute mechanical small bowel obstruction with transition point in the right lower quadrant of the pelvis.  Incidental findings of the left adrenal nodule and a left anterior breast mass along with fatty liver.    I spoke with surgery, Dr. Lord.  He recommended NG tube placement and check lactic acid.    I discussed the results and the plan with the patient.  I have not discussed the incidental findings with her.    NG tube was successfully placed.  Patient noted improvement in her symptomatology.  Dr. Lord was able to see the patient in the ED.  I also spoke with the hospitalist who graciously accepted the patient for admission.  She has had improvement in her vital signs and is currently stable.      Procedures    Results for orders placed or performed during the hospital encounter of 12/31/23 (from the past 24 hour(s))   CBC with platelets differential    Narrative    The following orders were created for panel order CBC with  platelets differential.  Procedure                               Abnormality         Status                     ---------                               -----------         ------                     CBC with platelets and d...[[id number]]  Abnormal            Final result                 Please view results for these tests on the individual orders.   Comprehensive metabolic panel   Result Value Ref Range    Sodium 133 (L) 135 - 145 mmol/L    Potassium 4.3 3.4 - 5.3 mmol/L    Carbon Dioxide (CO2) 18 (L) 22 - 29 mmol/L    Anion Gap 19 (H) 7 - 15 mmol/L    Urea Nitrogen 26.2 (H) 8.0 - 23.0 mg/dL    Creatinine 0.92 0.51 - 0.95 mg/dL    GFR Estimate 69 >60 mL/min/1.73m2    Calcium 9.7 8.8 - 10.2 mg/dL    Chloride 96 (L) 98 - 107 mmol/L    Glucose 162 (H) 70 - 99 mg/dL    Alkaline Phosphatase 83 40 - 150 U/L    AST 17 0 - 45 U/L    ALT 23 0 - 50 U/L    Protein Total 8.3 6.4 - 8.3 g/dL    Albumin 4.5 3.5 - 5.2 g/dL    Bilirubin Total 0.9 <=1.2 mg/dL   Lipase   Result Value Ref Range    Lipase 18 13 - 60 U/L   CBC with platelets and differential   Result Value Ref Range    WBC Count 19.5 (H) 4.0 - 11.0 10e3/uL    RBC Count 4.90 3.80 - 5.20 10e6/uL    Hemoglobin 16.1 (H) 11.7 - 15.7 g/dL    Hematocrit 47.3 (H) 35.0 - 47.0 %    MCV 97 78 - 100 fL    MCH 32.9 26.5 - 33.0 pg    MCHC 34.0 31.5 - 36.5 g/dL    RDW 12.3 10.0 - 15.0 %    Platelet Count 255 150 - 450 10e3/uL    % Neutrophils 84 %    % Lymphocytes 10 %    % Monocytes 5 %    % Eosinophils 0 %    % Basophils 0 %    % Immature Granulocytes 1 %    NRBCs per 100 WBC 0 <1 /100    Absolute Neutrophils 16.3 (H) 1.6 - 8.3 10e3/uL    Absolute Lymphocytes 2.0 0.8 - 5.3 10e3/uL    Absolute Monocytes 1.0 0.0 - 1.3 10e3/uL    Absolute Eosinophils 0.0 0.0 - 0.7 10e3/uL    Absolute Basophils 0.0 0.0 - 0.2 10e3/uL    Absolute Immature Granulocytes 0.2 <=0.4 10e3/uL    Absolute NRBCs 0.0 10e3/uL   CT Abdomen Pelvis w Contrast    Narrative    EXAM: CT ABDOMEN PELVIS W CONTRAST  LOCATION:  [facility]  DATE: 12/31/2023    INDICATION: Abdominal pain, nausea, vomiting.  History of hysterectomy.  No bowel movement x 2 days.  Not passing gas.  Concern for obstruction  COMPARISON: Screening mammogram from 11/21/2013.  TECHNIQUE: CT scan of the abdomen and pelvis was performed following injection of IV contrast. Multiplanar reformats were obtained. Dose reduction techniques were used.  CONTRAST: 55 mL of Isovue-370    FINDINGS:   LOWER CHEST: Mild elevation the right hemidiaphragm. Coronary atherosclerosis. There is a breast mass in the left inferior breast at approximately the 6:00 position measuring 1.5 x 1.1 cm on series 3 image 10.    HEPATOBILIARY: Hepatic steatosis. Small volume biliary sludge. No biliary ductal dilation.    PANCREAS: Normal.    SPLEEN: Normal.    ADRENAL GLANDS: Indeterminant 1.1 cm left adrenal nodule    KIDNEYS/BLADDER: No significant mass, stone, or hydronephrosis.    BOWEL: There are numerous fluid-filled dilated loops of small bowel with a transition point in the right lower quadrant pelvis on series 4 image 60 and 3 image 177. There is mild mesenteric engorgement. Small volume reactive ascites. No free air or   pneumatosis. Diverticulosis. No evidence of appendicitis.    LYMPH NODES: Normal.    VASCULATURE: No abdominal aortic aneurysm.    PELVIC ORGANS: Hysterectomy.    MUSCULOSKELETAL: Degenerative changes of the spine.      Impression    IMPRESSION:   1.  Acute mechanical small bowel obstruction with transition point in the right lower quadrant pelvis.  2.  Indeterminate left adrenal nodule. Follow-up with nonemergent outpatient adrenal mass CT.  3.  Indeterminant left anterior breast mass at approximately the 6:00 position. Recommend follow-up with nonemergent outpatient breast imaging.  4.  Hepatic steatosis.   Lactic acid whole blood   Result Value Ref Range    Lactic Acid 1.1 0.7 - 2.0 mmol/L   Magnesium   Result Value Ref Range     Magnesium 2.2 1.7 - 2.3 mg/dL   X-ray Chest 1 vw port    Narrative    EXAM: XR CHEST PORT 1 VIEW  LOCATION: [facility]  DATE: 12/31/2023    INDICATION: NG placement  COMPARISON: CT abdomen pelvis 12/31/2023.      Impression    IMPRESSION: Nasogastric tube tip in the stomach. Mild atelectasis at the left base. No pneumothorax or definite pleural effusion. Normal heart size.        Medications   sodium chloride 0.9 % infusion ( Intravenous $New Bag 12/31/23 1340)   lidocaine 1 % 0.1-1 mL (has no administration in time range)   lidocaine (LMX4) kit (has no administration in time range)   sodium chloride (PF) 0.9% PF flush 3 mL (3 mLs Intracatheter Not Given 12/31/23 1627)   sodium chloride (PF) 0.9% PF flush 3 mL (has no administration in time range)   senna-docusate (SENOKOT-S/PERICOLACE) 8.6-50 MG per tablet 1 tablet (has no administration in time range)     Or   senna-docusate (SENOKOT-S/PERICOLACE) 8.6-50 MG per tablet 2 tablet (has no administration in time range)   calcium carbonate (TUMS) chewable tablet 1,000 mg (has no administration in time range)   enoxaparin ANTICOAGULANT (LOVENOX) injection 40 mg (40 mg Subcutaneous $Given 12/31/23 1644)   hydrALAZINE (APRESOLINE) tablet 10 mg (has no administration in time range)     Or   hydrALAZINE (APRESOLINE) injection 10 mg (has no administration in time range)   HYDROmorphone (DILAUDID) injection 0.2 mg (0.2 mg Intravenous $Given 12/31/23 1802)   HYDROmorphone (DILAUDID) injection 0.4 mg (has no administration in time range)   ondansetron (ZOFRAN ODT) ODT tab 4 mg (has no administration in time range)     Or   ondansetron (ZOFRAN) injection 4 mg (has no administration in time range)   prochlorperazine (COMPAZINE) injection 5 mg (has no administration in time range)     Or   prochlorperazine (COMPAZINE) tablet 5 mg (has no administration in time range)     Or   prochlorperazine (COMPAZINE) suppository 12.5 mg (has no administration in  time range)   influenza vac high-dose quad (FLUZONE HD) injection JOAN 0.7 mL (has no administration in time range)   naloxone (NARCAN) injection 0.2 mg (has no administration in time range)     Or   naloxone (NARCAN) injection 0.4 mg (has no administration in time range)     Or   naloxone (NARCAN) injection 0.2 mg (has no administration in time range)     Or   naloxone (NARCAN) injection 0.4 mg (has no administration in time range)   sodium chloride 0.9% BOLUS 1,000 mL (0 mLs Intravenous Stopped 12/31/23 1240)   HYDROmorphone (PF) (DILAUDID) injection 0.5 mg (0.5 mg Intravenous $Given 12/31/23 1504)   sodium chloride 0.9 % bag 100 mL for CT scan flush use (70 mLs Intravenous $Given 12/31/23 1111)   iopamidol (ISOVUE-370) solution 500 mL (55 mLs Intravenous $Given 12/31/23 1111)   oxymetazoline (AFRIN) 0.05 % spray 2 spray (2 sprays Nasal $Given 12/31/23 1300)   sodium chloride 0.9% BOLUS 1,000 mL (1,000 mLs Intravenous $New Bag 12/31/23 1242)       Assessments & Plan      I have reviewed the findings, diagnosis, plan with the patient.    There are no discharge medications for this patient.      Final diagnoses:   SBO (small bowel obstruction) (H)   Adrenal nodule (H24)   Mass of left breast, unspecified quadrant     Disposition: Patient admitted to the hospitalist service.  Surgery is consulting.    Note: Chart documentation done in part with Dragon Voice Recognition software. Although reviewed after completion, some word and grammatical errors may remain.     12/31/2023   [address] MEDICAL SURGICAL       DarciGabriella bravo MD  12/31/23 2055

## 2024-01-01 NOTE — PROGRESS NOTES
[facility]    Medicine Progress Note - Hospitalist Service    Date of Admission:  12/31/2023    Assessment & Plan      Jinny Smith is a 63 year old female admitted on 12/31/2023. She has a past medical history of hypertension, hyperlipidemia-although has not been on medications for several years.  Also has a history of hysterectomy.    She presented to the ED on 12/31/2023 with 4-day history of abdominal pain, cramping.  She had only taken sips of water since 12/28, but then this morning started vomiting.  Last bowel movement Friday 12/29 and it was normal.    Workup in the ED included CT of abdomen and pelvis.  Showing acute mechanical small bowel obstruction with transition point in the right lower quadrant pelvis.  Also noted to have indeterminate left adrenal nodule and indeterminate left anterior breast mass.  Lab work indicated mild hyponatremia with sodium of 133, elevated anion gap at 19, BUN elevated at 26.2, elevated WBC at 19.5, with an elevated hemoglobin of 16.1.  Lactic acid normal at 1.1    She is being admitted as an inpatient due to an acute small bowel obstruction.    SBO (small bowel obstruction) (H)    Assessment: Scented with a 4-day history of abdominal pain and cramping.  Gradually worsening.  With vomiting this morning.  CT of abdomen and pelvis indicates acute mechanical bowel obstruction.  Surgery consulted by the ED provider.  NG tube placed, recommend n.p.o.    Abdominal x-ray shows ongoing small bowel obstruction.  Has not been seen by surgery yet today.  Patient is NG tube clamped due to Gastrografin ordered.  She is having some abdominal discomfort.  Recently took hydromorphone for pain    Plan:   -per surgery  -N.p.o.  -IV normal saline at 125 cc an hour  -NG tube to low intermittent suction  -Hydromorphone as needed for pain  -Antiemetics for nausea and vomiting  Hypertension goal BP (blood pressure) < 140/80    Assessment: Has a history of  hypertension, but currently not taking any medications.  Has not followed up with primary care provider for several years.  Initially in the ED blood pressure elevated at 179/123, with an elevated pulse.  With IV fluids and pain management and this has improved to 142/69, pulse 84.    Plan: Monitor blood pressure  Hydralazine as needed for systolic blood pressure greater than 180  H/O: hysterectomy    Assessment: History of hysterectomy    Tobacco use disorder    Assessment: Patient reports she smokes a half a pack or less of cigarettes a day.  She has not smoked since 12/28.  Has not noted any symptoms of acute nicotine withdrawal    Plan: Continue to monitor, consider nicotine replacement if needed    Leukocytosis -resolved    Assessment: On admission WBC 19.5, this morning WBC 10.5.  Patient has been afebrile without any other signs or symptoms of acute infection.  Suspect secondary to small bowel obstruction    Plan:   -Monitor for signs and symptoms of infection  -Recheck WBC in morning    Hyponatremia resolved    Assessment: Sodium 134 on admission.  After IV fluids sodium 142 this morning.  -Likely hypovolemic  -Start normal saline IV fluids  -Repeat sodium in AM  -If not improving consider serum osmolality and urine sodium  Left adrenal nodule  Left anterior breast mass  Assessment: Did on CT scan  Plan: Follow-up as outpatient          Diet: NPO for Medical/Clinical Reasons Except for: No Exceptions    DVT Prophylaxis: stop lovenox incase of surgery.  SCDs  Hanna Catheter: Not present  Lines: None     Cardiac Monitoring: None  Code Status: Full Code      Clinically Significant Risk Factors Present on Admission             # Anion Gap Metabolic Acidosis: Highest Anion Gap = 19 mmol/L in last 2 days, will monitor and treat as appropriate      # Hypertension: Noted on problem list      # Obesity: Estimated body mass index is 31.18 kg/m  as calculated from the following:    Height as of this encounter: 1.6 m  "(5' 3\").    Weight as of this encounter: 79.8 kg (176 lb).              Disposition Plan     Expected Discharge Date: 01/02/2024                  The patient's care was discussed with the Patient.    Lelia Farr CNP  Hospitalist Service  [facility]  Securely message with [employer] (more info)  Text page via [employer] Paging/Directory   ______________________________________________________________________    Interval History   No acute events overnight.  Patient continues to experience significant output from NG tube, along with abdominal fullness and discomfort.  No vomiting    Physical Exam   Vital Signs: Temp: 98.6  F (37  C) Temp src: Oral BP: (!) 173/77 Pulse: 74   Resp: 20 SpO2: 91 % O2 Device: None (Room air)    Weight: 176 lbs 0 oz    General Appearance: Patient alert and oriented.  Sitting on edge of bed.  Appears uncomfortable, although in no acute distress  Respiratory: Respiratory effort easy, no cough.  Lung sounds clear but diminished in bases  Cardiovascular: Regular rate and rhythm  GI: Abdomen distended with mild tenderness throughout, hypoactive bowel sounds  Skin: Warm and dry    Medical Decision Making       40 MINUTES SPENT BY ME on the date of service doing chart review, history, exam, documentation & further activities per the note.      Data     I have personally reviewed the following data over the past 24 hrs:    10.5  \   12.7   / 187     142 104 24.3 (H) /  81   3.6 23 0.71 \       Imaging results reviewed over the past 24 hrs:   Recent Results (from the past 24 hour(s))   X-ray Chest 1 vw port    Narrative    EXAM: XR CHEST PORT 1 VIEW  LOCATION: [facility]  DATE: 12/31/2023    INDICATION: NG placement  COMPARISON: CT abdomen pelvis 12/31/2023.      Impression    IMPRESSION: Nasogastric tube tip in the stomach. Mild atelectasis at the left base. No pneumothorax or definite pleural effusion. Normal heart size.    X-ray Abdomen flat " port    Narrative    EXAM: XR ABDOMEN PORT 1 VIEW  LOCATION: [facility]  DATE: 1/1/2024    INDICATION: SBO  COMPARISON: CT 12/31/2023.      Impression    IMPRESSION: Supine abdomen. NG tube in the stomach. There are again multiple dilated small bowel loops consistent with small bowel obstruction.

## 2024-01-01 NOTE — PLAN OF CARE
Goal Outcome Evaluation:      Plan of Care Reviewed With: patient    Overall Patient Progress: no changeOverall Patient Progress: no change    Outcome Evaluation: Pt. A&Ox4, VSS on RA. Total NG oputput 950 this shift after gastrografin challenge, plan for imaging [id number], NG to LIS. Continues NPO diet, OK for small amount of ice. IVF continue. Ambulating in room IND. Pain controlled with PRN dilaudid, given x1, see MAR. Zofran x1 used before gastrografin. Pain to mid-upper abd, soft, tender upon palpation. Denies passing gas, bowel sounds hyperactive.

## 2024-01-01 NOTE — PLAN OF CARE
"Goal Outcome Evaluation:      Plan of Care Reviewed With: patient    Overall Patient Progress: improvingOverall Patient Progress: improving    Outcome Evaluation: Patient on an NGT, lower intermittent suction, had a yellowish output. Patient reported feeling distended on her abdomen, hypoactive bowel sounds in four quadrants, tender upon palpation.  PRN IV Dilaudid 0.2mg given due to abdominal cramping and was effective. Independent in her room. NSS running @125mL/hr. No bowel movement this shift. Last BM: 12/29.    BP (!) 149/65 (BP Location: Left arm)   Pulse 87   Temp 99.1  F (37.3  C) (Oral)   Resp 18   Ht 1.6 m (5' 3\")   Wt 79.8 kg (176 lb)   SpO2 93%   BMI 31.18 kg/m      "

## 2024-01-02 ENCOUNTER — ANESTHESIA EVENT (OUTPATIENT)
Dept: SURGERY | Facility: CLINIC | Age: 64
DRG: 330 | End: 2024-01-02
Payer: MEDICARE

## 2024-01-02 ENCOUNTER — ANESTHESIA (OUTPATIENT)
Dept: SURGERY | Facility: CLINIC | Age: 64
DRG: 330 | End: 2024-01-02
Payer: MEDICARE

## 2024-01-02 ENCOUNTER — ANCILLARY PROCEDURE (OUTPATIENT)
Dept: ULTRASOUND IMAGING | Facility: CLINIC | Age: 64
End: 2024-01-02
Attending: NURSE ANESTHETIST, CERTIFIED REGISTERED
Payer: MEDICARE

## 2024-01-02 PROBLEM — E27.9 ADRENAL NODULE (H): Status: ACTIVE | Noted: 2024-01-02

## 2024-01-02 PROBLEM — N63.20 MASS OF LEFT BREAST, UNSPECIFIED QUADRANT: Status: ACTIVE | Noted: 2024-01-02

## 2024-01-02 LAB
ANION GAP SERPL CALCULATED.3IONS-SCNC: 18 MMOL/L (ref 7–15)
BUN SERPL-MCNC: 25.7 MG/DL (ref 8–23)
CALCIUM SERPL-MCNC: 8.9 MG/DL (ref 8.8–10.2)
CHLORIDE SERPL-SCNC: 107 MMOL/L (ref 98–107)
CREAT SERPL-MCNC: 0.6 MG/DL (ref 0.51–0.95)
DEPRECATED HCO3 PLAS-SCNC: 21 MMOL/L (ref 22–29)
EGFRCR SERPLBLD CKD-EPI 2021: >90 ML/MIN/1.73M2
ERYTHROCYTE [DISTWIDTH] IN BLOOD BY AUTOMATED COUNT: 12.5 % (ref 10–15)
GLUCOSE SERPL-MCNC: 83 MG/DL (ref 70–99)
HCT VFR BLD AUTO: 39.8 % (ref 35–47)
HGB BLD-MCNC: 12.8 G/DL (ref 11.7–15.7)
HOLD SPECIMEN: NORMAL
INR PPP: 1.11 (ref 0.85–1.15)
MAGNESIUM SERPL-MCNC: 2.2 MG/DL (ref 1.7–2.3)
MCH RBC QN AUTO: 32.7 PG (ref 26.5–33)
MCHC RBC AUTO-ENTMCNC: 32.2 G/DL (ref 31.5–36.5)
MCV RBC AUTO: 102 FL (ref 78–100)
PLATELET # BLD AUTO: 192 10E3/UL (ref 150–450)
POTASSIUM SERPL-SCNC: 3.5 MMOL/L (ref 3.4–5.3)
RBC # BLD AUTO: 3.91 10E6/UL (ref 3.8–5.2)
SODIUM SERPL-SCNC: 146 MMOL/L (ref 135–145)
WBC # BLD AUTO: 10.6 10E3/UL (ref 4–11)

## 2024-01-02 PROCEDURE — 250N000009 HC RX 250: Performed by: NURSE ANESTHETIST, CERTIFIED REGISTERED

## 2024-01-02 PROCEDURE — 120N000001 HC R&B MED SURG/OB

## 2024-01-02 PROCEDURE — 272N000001 HC OR GENERAL SUPPLY STERILE: Performed by: SPECIALIST

## 2024-01-02 PROCEDURE — 258N000003 HC RX IP 258 OP 636: Performed by: NURSE ANESTHETIST, CERTIFIED REGISTERED

## 2024-01-02 PROCEDURE — 250N000011 HC RX IP 250 OP 636: Performed by: NURSE ANESTHETIST, CERTIFIED REGISTERED

## 2024-01-02 PROCEDURE — 999N000141 HC STATISTIC PRE-PROCEDURE NURSING ASSESSMENT: Performed by: SPECIALIST

## 2024-01-02 PROCEDURE — 250N000013 HC RX MED GY IP 250 OP 250 PS 637: Performed by: INTERNAL MEDICINE

## 2024-01-02 PROCEDURE — 99232 SBSQ HOSP IP/OBS MODERATE 35: CPT | Mod: 57 | Performed by: SPECIALIST

## 2024-01-02 PROCEDURE — 0DN80ZZ RELEASE SMALL INTESTINE, OPEN APPROACH: ICD-10-PCS | Performed by: SPECIALIST

## 2024-01-02 PROCEDURE — 93005 ELECTROCARDIOGRAM TRACING: CPT

## 2024-01-02 PROCEDURE — 250N000025 HC SEVOFLURANE, PER MIN: Performed by: SPECIALIST

## 2024-01-02 PROCEDURE — 250N000009 HC RX 250: Performed by: SPECIALIST

## 2024-01-02 PROCEDURE — C9290 INJ, BUPIVACAINE LIPOSOME: HCPCS | Performed by: NURSE ANESTHETIST, CERTIFIED REGISTERED

## 2024-01-02 PROCEDURE — 44005 FREEING OF BOWEL ADHESION: CPT | Performed by: SPECIALIST

## 2024-01-02 PROCEDURE — 250N000011 HC RX IP 250 OP 636: Performed by: NURSE PRACTITIONER

## 2024-01-02 PROCEDURE — 99232 SBSQ HOSP IP/OBS MODERATE 35: CPT | Performed by: NURSE PRACTITIONER

## 2024-01-02 PROCEDURE — 36415 COLL VENOUS BLD VENIPUNCTURE: CPT | Performed by: NURSE PRACTITIONER

## 2024-01-02 PROCEDURE — 258N000003 HC RX IP 258 OP 636: Performed by: NURSE PRACTITIONER

## 2024-01-02 PROCEDURE — 710N000010 HC RECOVERY PHASE 1, LEVEL 2, PER MIN: Performed by: SPECIALIST

## 2024-01-02 PROCEDURE — 85027 COMPLETE CBC AUTOMATED: CPT | Performed by: NURSE PRACTITIONER

## 2024-01-02 PROCEDURE — 0DQ80ZZ REPAIR SMALL INTESTINE, OPEN APPROACH: ICD-10-PCS | Performed by: SPECIALIST

## 2024-01-02 PROCEDURE — 83735 ASSAY OF MAGNESIUM: CPT | Performed by: NURSE PRACTITIONER

## 2024-01-02 PROCEDURE — 370N000017 HC ANESTHESIA TECHNICAL FEE, PER MIN: Performed by: SPECIALIST

## 2024-01-02 PROCEDURE — 250N000011 HC RX IP 250 OP 636: Performed by: SPECIALIST

## 2024-01-02 PROCEDURE — 258N000003 HC RX IP 258 OP 636: Performed by: SPECIALIST

## 2024-01-02 PROCEDURE — 80048 BASIC METABOLIC PNL TOTAL CA: CPT | Performed by: NURSE PRACTITIONER

## 2024-01-02 PROCEDURE — 85610 PROTHROMBIN TIME: CPT | Performed by: NURSE PRACTITIONER

## 2024-01-02 PROCEDURE — 0DNU4ZZ RELEASE OMENTUM, PERCUTANEOUS ENDOSCOPIC APPROACH: ICD-10-PCS | Performed by: SPECIALIST

## 2024-01-02 PROCEDURE — 360N000076 HC SURGERY LEVEL 3, PER MIN: Performed by: SPECIALIST

## 2024-01-02 RX ORDER — DEXAMETHASONE SODIUM PHOSPHATE 4 MG/ML
INJECTION, SOLUTION INTRA-ARTICULAR; INTRALESIONAL; INTRAMUSCULAR; INTRAVENOUS; SOFT TISSUE PRN
Status: DISCONTINUED | OUTPATIENT
Start: 2024-01-02 | End: 2024-01-02

## 2024-01-02 RX ORDER — FENTANYL CITRATE 50 UG/ML
50 INJECTION, SOLUTION INTRAMUSCULAR; INTRAVENOUS EVERY 5 MIN PRN
Status: DISCONTINUED | OUTPATIENT
Start: 2024-01-02 | End: 2024-01-02 | Stop reason: HOSPADM

## 2024-01-02 RX ORDER — HYDRALAZINE HYDROCHLORIDE 10 MG/1
10 TABLET, FILM COATED ORAL EVERY 4 HOURS PRN
Status: DISCONTINUED | OUTPATIENT
Start: 2024-01-02 | End: 2024-01-05 | Stop reason: HOSPADM

## 2024-01-02 RX ORDER — SODIUM CHLORIDE 9 MG/ML
INJECTION, SOLUTION INTRAVENOUS CONTINUOUS
Status: DISCONTINUED | OUTPATIENT
Start: 2024-01-02 | End: 2024-01-05

## 2024-01-02 RX ORDER — PROPOFOL 10 MG/ML
INJECTION, EMULSION INTRAVENOUS CONTINUOUS PRN
Status: DISCONTINUED | OUTPATIENT
Start: 2024-01-02 | End: 2024-01-02

## 2024-01-02 RX ORDER — HYDROMORPHONE HYDROCHLORIDE 1 MG/ML
0.2 INJECTION, SOLUTION INTRAMUSCULAR; INTRAVENOUS; SUBCUTANEOUS EVERY 5 MIN PRN
Status: DISCONTINUED | OUTPATIENT
Start: 2024-01-02 | End: 2024-01-02 | Stop reason: HOSPADM

## 2024-01-02 RX ORDER — LIDOCAINE HYDROCHLORIDE 20 MG/ML
INJECTION, SOLUTION INFILTRATION; PERINEURAL PRN
Status: DISCONTINUED | OUTPATIENT
Start: 2024-01-02 | End: 2024-01-02

## 2024-01-02 RX ORDER — HYDROMORPHONE HCL IN WATER/PF 6 MG/30 ML
0.4 PATIENT CONTROLLED ANALGESIA SYRINGE INTRAVENOUS
Status: DISCONTINUED | OUTPATIENT
Start: 2024-01-02 | End: 2024-01-04

## 2024-01-02 RX ORDER — BUPIVACAINE HYDROCHLORIDE 2.5 MG/ML
INJECTION, SOLUTION EPIDURAL; INFILTRATION; INTRACAUDAL PRN
Status: DISCONTINUED | OUTPATIENT
Start: 2024-01-02 | End: 2024-01-02

## 2024-01-02 RX ORDER — ONDANSETRON 4 MG/1
4 TABLET, ORALLY DISINTEGRATING ORAL EVERY 30 MIN PRN
Status: DISCONTINUED | OUTPATIENT
Start: 2024-01-02 | End: 2024-01-02 | Stop reason: HOSPADM

## 2024-01-02 RX ORDER — ACETAMINOPHEN 325 MG/1
975 TABLET ORAL EVERY 8 HOURS
Status: DISCONTINUED | OUTPATIENT
Start: 2024-01-02 | End: 2024-01-05 | Stop reason: HOSPADM

## 2024-01-02 RX ORDER — KETOROLAC TROMETHAMINE 30 MG/ML
INJECTION, SOLUTION INTRAMUSCULAR; INTRAVENOUS PRN
Status: DISCONTINUED | OUTPATIENT
Start: 2024-01-02 | End: 2024-01-02

## 2024-01-02 RX ORDER — ONDANSETRON 2 MG/ML
INJECTION INTRAMUSCULAR; INTRAVENOUS PRN
Status: DISCONTINUED | OUTPATIENT
Start: 2024-01-02 | End: 2024-01-02

## 2024-01-02 RX ORDER — FENTANYL CITRATE 50 UG/ML
25 INJECTION, SOLUTION INTRAMUSCULAR; INTRAVENOUS EVERY 5 MIN PRN
Status: DISCONTINUED | OUTPATIENT
Start: 2024-01-02 | End: 2024-01-02 | Stop reason: HOSPADM

## 2024-01-02 RX ORDER — FENTANYL CITRATE 50 UG/ML
INJECTION, SOLUTION INTRAMUSCULAR; INTRAVENOUS PRN
Status: DISCONTINUED | OUTPATIENT
Start: 2024-01-02 | End: 2024-01-02

## 2024-01-02 RX ORDER — DIPHENHYDRAMINE HCL 12.5 MG/5ML
12.5 SOLUTION ORAL EVERY 6 HOURS PRN
Status: DISCONTINUED | OUTPATIENT
Start: 2024-01-02 | End: 2024-01-05 | Stop reason: HOSPADM

## 2024-01-02 RX ORDER — ACETAMINOPHEN 325 MG/1
650 TABLET ORAL EVERY 4 HOURS PRN
Status: DISCONTINUED | OUTPATIENT
Start: 2024-01-05 | End: 2024-01-05 | Stop reason: HOSPADM

## 2024-01-02 RX ORDER — HYDROMORPHONE HCL IN WATER/PF 6 MG/30 ML
0.2 PATIENT CONTROLLED ANALGESIA SYRINGE INTRAVENOUS
Status: DISCONTINUED | OUTPATIENT
Start: 2024-01-02 | End: 2024-01-04

## 2024-01-02 RX ORDER — HYDRALAZINE HYDROCHLORIDE 20 MG/ML
10 INJECTION INTRAMUSCULAR; INTRAVENOUS EVERY 4 HOURS PRN
Status: DISCONTINUED | OUTPATIENT
Start: 2024-01-02 | End: 2024-01-05 | Stop reason: HOSPADM

## 2024-01-02 RX ORDER — DIPHENHYDRAMINE HYDROCHLORIDE 50 MG/ML
12.5 INJECTION INTRAMUSCULAR; INTRAVENOUS EVERY 6 HOURS PRN
Status: DISCONTINUED | OUTPATIENT
Start: 2024-01-02 | End: 2024-01-05 | Stop reason: HOSPADM

## 2024-01-02 RX ORDER — HYDROMORPHONE HYDROCHLORIDE 1 MG/ML
0.4 INJECTION, SOLUTION INTRAMUSCULAR; INTRAVENOUS; SUBCUTANEOUS EVERY 5 MIN PRN
Status: DISCONTINUED | OUTPATIENT
Start: 2024-01-02 | End: 2024-01-02 | Stop reason: HOSPADM

## 2024-01-02 RX ORDER — ONDANSETRON 2 MG/ML
4 INJECTION INTRAMUSCULAR; INTRAVENOUS EVERY 6 HOURS PRN
Status: DISCONTINUED | OUTPATIENT
Start: 2024-01-02 | End: 2024-01-02

## 2024-01-02 RX ORDER — ONDANSETRON 4 MG/1
4 TABLET, ORALLY DISINTEGRATING ORAL EVERY 6 HOURS PRN
Status: DISCONTINUED | OUTPATIENT
Start: 2024-01-02 | End: 2024-01-02

## 2024-01-02 RX ORDER — PROPOFOL 10 MG/ML
INJECTION, EMULSION INTRAVENOUS PRN
Status: DISCONTINUED | OUTPATIENT
Start: 2024-01-02 | End: 2024-01-02

## 2024-01-02 RX ORDER — ONDANSETRON 2 MG/ML
4 INJECTION INTRAMUSCULAR; INTRAVENOUS EVERY 30 MIN PRN
Status: DISCONTINUED | OUTPATIENT
Start: 2024-01-02 | End: 2024-01-02 | Stop reason: HOSPADM

## 2024-01-02 RX ORDER — SODIUM CHLORIDE, SODIUM LACTATE, POTASSIUM CHLORIDE, CALCIUM CHLORIDE 600; 310; 30; 20 MG/100ML; MG/100ML; MG/100ML; MG/100ML
INJECTION, SOLUTION INTRAVENOUS CONTINUOUS
Status: DISCONTINUED | OUTPATIENT
Start: 2024-01-02 | End: 2024-01-02 | Stop reason: HOSPADM

## 2024-01-02 RX ORDER — CEFAZOLIN SODIUM/WATER 2 G/20 ML
2 SYRINGE (ML) INTRAVENOUS SEE ADMIN INSTRUCTIONS
Status: DISCONTINUED | OUTPATIENT
Start: 2024-01-02 | End: 2024-01-02 | Stop reason: HOSPADM

## 2024-01-02 RX ORDER — CEFAZOLIN SODIUM/WATER 2 G/20 ML
2 SYRINGE (ML) INTRAVENOUS
Status: COMPLETED | OUTPATIENT
Start: 2024-01-02 | End: 2024-01-02

## 2024-01-02 RX ORDER — ENOXAPARIN SODIUM 100 MG/ML
40 INJECTION SUBCUTANEOUS EVERY 24 HOURS
Status: DISCONTINUED | OUTPATIENT
Start: 2024-01-03 | End: 2024-01-05 | Stop reason: HOSPADM

## 2024-01-02 RX ORDER — FAMOTIDINE 20 MG/1
20 TABLET, FILM COATED ORAL 2 TIMES DAILY
Status: DISCONTINUED | OUTPATIENT
Start: 2024-01-02 | End: 2024-01-05 | Stop reason: HOSPADM

## 2024-01-02 RX ORDER — BUPIVACAINE HYDROCHLORIDE AND EPINEPHRINE 2.5; 5 MG/ML; UG/ML
INJECTION, SOLUTION INFILTRATION; PERINEURAL PRN
Status: DISCONTINUED | OUTPATIENT
Start: 2024-01-02 | End: 2024-01-02 | Stop reason: HOSPADM

## 2024-01-02 RX ORDER — PROCHLORPERAZINE MALEATE 5 MG
5 TABLET ORAL EVERY 6 HOURS PRN
Status: DISCONTINUED | OUTPATIENT
Start: 2024-01-02 | End: 2024-01-02

## 2024-01-02 RX ORDER — LIDOCAINE 40 MG/G
CREAM TOPICAL
Status: DISCONTINUED | OUTPATIENT
Start: 2024-01-02 | End: 2024-01-05 | Stop reason: HOSPADM

## 2024-01-02 RX ORDER — FAMOTIDINE 20 MG/1
20 TABLET, FILM COATED ORAL 2 TIMES DAILY
Status: DISCONTINUED | OUTPATIENT
Start: 2024-01-02 | End: 2024-01-02

## 2024-01-02 RX ADMIN — Medication 2 G: at 19:12

## 2024-01-02 RX ADMIN — PROPOFOL 200 MG: 10 INJECTION, EMULSION INTRAVENOUS at 19:22

## 2024-01-02 RX ADMIN — PHENYLEPHRINE HYDROCHLORIDE 100 MCG: 10 INJECTION INTRAVENOUS at 20:19

## 2024-01-02 RX ADMIN — Medication 1 LOZENGE: at 02:19

## 2024-01-02 RX ADMIN — HYDROMORPHONE HYDROCHLORIDE 0.2 MG: 0.2 INJECTION, SOLUTION INTRAMUSCULAR; INTRAVENOUS; SUBCUTANEOUS at 16:46

## 2024-01-02 RX ADMIN — MIDAZOLAM 2 MG: 1 INJECTION INTRAMUSCULAR; INTRAVENOUS at 19:12

## 2024-01-02 RX ADMIN — BUPIVACAINE HYDROCHLORIDE 20 ML: 2.5 INJECTION, SOLUTION EPIDURAL; INFILTRATION; INTRACAUDAL; PERINEURAL at 20:59

## 2024-01-02 RX ADMIN — SODIUM CHLORIDE: 9 INJECTION, SOLUTION INTRAVENOUS at 05:06

## 2024-01-02 RX ADMIN — ONDANSETRON 4 MG: 2 INJECTION INTRAMUSCULAR; INTRAVENOUS at 20:35

## 2024-01-02 RX ADMIN — FENTANYL CITRATE 50 MCG: 50 INJECTION INTRAMUSCULAR; INTRAVENOUS at 19:12

## 2024-01-02 RX ADMIN — PROPOFOL 100 MCG/KG/MIN: 10 INJECTION, EMULSION INTRAVENOUS at 19:25

## 2024-01-02 RX ADMIN — HYDROMORPHONE HYDROCHLORIDE 0.5 MG: 1 INJECTION, SOLUTION INTRAMUSCULAR; INTRAVENOUS; SUBCUTANEOUS at 19:55

## 2024-01-02 RX ADMIN — BUPIVACAINE 10 ML: 13.3 INJECTION, SUSPENSION, LIPOSOMAL INFILTRATION at 20:59

## 2024-01-02 RX ADMIN — DEXMEDETOMIDINE HYDROCHLORIDE 10 MCG: 100 INJECTION, SOLUTION INTRAVENOUS at 19:26

## 2024-01-02 RX ADMIN — SODIUM CHLORIDE: 9 INJECTION, SOLUTION INTRAVENOUS at 19:15

## 2024-01-02 RX ADMIN — ROCURONIUM BROMIDE 100 MG: 50 INJECTION, SOLUTION INTRAVENOUS at 19:22

## 2024-01-02 RX ADMIN — FENTANYL CITRATE 50 MCG: 50 INJECTION, SOLUTION INTRAMUSCULAR; INTRAVENOUS at 21:54

## 2024-01-02 RX ADMIN — SODIUM CHLORIDE: 9 INJECTION, SOLUTION INTRAVENOUS at 13:15

## 2024-01-02 RX ADMIN — HYDROMORPHONE HYDROCHLORIDE 0.4 MG: 0.2 INJECTION, SOLUTION INTRAMUSCULAR; INTRAVENOUS; SUBCUTANEOUS at 13:15

## 2024-01-02 RX ADMIN — BUPIVACAINE HYDROCHLORIDE 20 ML: 2.5 INJECTION, SOLUTION EPIDURAL; INFILTRATION; INTRACAUDAL; PERINEURAL at 20:52

## 2024-01-02 RX ADMIN — KETOROLAC TROMETHAMINE 30 MG: 30 INJECTION, SOLUTION INTRAMUSCULAR at 20:38

## 2024-01-02 RX ADMIN — BUPIVACAINE 10 ML: 13.3 INJECTION, SUSPENSION, LIPOSOMAL INFILTRATION at 20:52

## 2024-01-02 RX ADMIN — SODIUM CHLORIDE: 9 INJECTION, SOLUTION INTRAVENOUS at 23:00

## 2024-01-02 RX ADMIN — LIDOCAINE HYDROCHLORIDE 50 MG: 20 INJECTION, SOLUTION INFILTRATION; PERINEURAL at 19:22

## 2024-01-02 RX ADMIN — HYDROMORPHONE HYDROCHLORIDE 0.2 MG: 0.2 INJECTION, SOLUTION INTRAMUSCULAR; INTRAVENOUS; SUBCUTANEOUS at 05:06

## 2024-01-02 RX ADMIN — SUGAMMADEX 200 MG: 100 INJECTION, SOLUTION INTRAVENOUS at 21:01

## 2024-01-02 RX ADMIN — Medication 1 LOZENGE: at 09:00

## 2024-01-02 RX ADMIN — FENTANYL CITRATE 50 MCG: 50 INJECTION, SOLUTION INTRAMUSCULAR; INTRAVENOUS at 22:01

## 2024-01-02 RX ADMIN — DEXAMETHASONE SODIUM PHOSPHATE 4 MG: 4 INJECTION, SOLUTION INTRA-ARTICULAR; INTRALESIONAL; INTRAMUSCULAR; INTRAVENOUS; SOFT TISSUE at 19:26

## 2024-01-02 ASSESSMENT — ACTIVITIES OF DAILY LIVING (ADL)
ADLS_ACUITY_SCORE: 18

## 2024-01-02 ASSESSMENT — LIFESTYLE VARIABLES: TOBACCO_USE: 1

## 2024-01-02 NOTE — OR NURSING
S-(situation): planned laparoscopy possible laparotomy with lysis of adhesions    B-(background): presented to ER on 12/31 w abdominal pain, found to have partial bowel obstruction. NGT placed for decompression, symptoms eased but persist    A-(assessment): report received from DEIRDRE Bae on med-surg floor. Pt. Remains with an NGT in place to low intermittent suction. Also IV NS at 125/hour. Up independent in room. Receiving occasional IV dilaudid for abdominal discomfort. No family with pt. At this time. Consent has not yet been signed as awaiting dr. Mena.    R-(recommendations): plan to bring pt. To same day surgery holding at approximately 6 pm for planned 7pm surgery. Message sent to Dr. Esquivel for pre-op orders. Hany GILMORE

## 2024-01-02 NOTE — PLAN OF CARE
Goal Outcome Evaluation:      Plan of Care Reviewed With: patient    Overall Patient Progress: no changeOverall Patient Progress: no change    Outcome Evaluation: Pt is A&Ox4. VSS on RA. Pt is up IND in room and is steady. Pt recieved dilaudid IV for pain managment and reports that it is working well for her to manage and control pain. Gastrographin was done today and xray showed gastrographin in colon. Pt denies passing flatus and denies bowel movement today. Bowel sounds are hypoactive. Pt has had good output via NG.

## 2024-01-02 NOTE — PROGRESS NOTES
"Formerly [facility]    Medicine Progress Note - Hospitalist Service    Date of Admission:  12/31/2023    Assessment & Plan   Jinny Smith is a 63 year old female admitted on 12/31/2023. She has a past medical history of hypertension, hyperlipidemia-although has not been on medications for several years.  Also has a history of hysterectomy.    She presented to the ED on 12/31/2023 with 4-day history of abdominal pain and cramping.  She had only taken sips of water since 12/28, but then this morning started vomiting.  Last bowel movement Friday 12/29 and it was normal.     Workup in the ED included CT of abdomen and pelvis revealing acute mechanical small bowel obstruction with transition point in the right lower quadrant pelvis.  Also noted to have indeterminate left adrenal nodule and indeterminate left anterior breast mass.  Lab work indicated mild hyponatremia with sodium of 133, elevated anion gap at 19, BUN elevated at 26.2, elevated WBC at 19.5, with an elevated hemoglobin of 16.1.  Lactic acid normal at 1.1     She was admitted as an inpatient due to an acute small bowel obstruction.     SBO (small bowel obstruction)  Presented with gradually worsening  4-day history of abdominal pain and cramping.  She had vomiting the am of admission.   CT of abdomen and pelvis revealed acute mechanical bowel obstruction.  Surgery consulted by the ED provider.  NG tube placed, recommend n.p.o.    She underwent gastrografin which revealed \"Free flow of contrast through the small bowel. Contrast freely enters the colon. The small bowel is distended measuring up to 4.8 cm in greatest radial dimension consistent with a partial mechanical small bowel obstruction.\"   She ultimately required the NG tube to be placed to suction due to increased abdominal discomfort on 1/1/24.  No bowel movement after gastrografin.         Plan:   -will be going to the OR this evening with General Surgery for " "diagnostic/possible exploratory laparotomy.    -N.p.o.  -IV crystalloid at 125 cc an hour  -NG tube to low intermittent suction  -Hydromorphone as needed for pain  -Antiemetics for nausea and vomiting    Hypertension goal BP (blood pressure) < 140/80   Has a history of hypertension, but currently not taking any medications.  Has not followed up with a primary care provider for several years.  Initially in the ED blood pressure elevated at 179/123, with an elevated pulse.  With crystalloid and pain management this has improved to 142/69, pulse 84.    Plan:  Hydralazine as needed for systolic blood pressure greater than 180      Tobacco use disorder  Patient reports she smokes a half a pack or less of cigarettes a day.  She has not smoked since 12/28.  Has not noted any symptoms of acute nicotine withdrawal    Plan:  consider nicotine replacement if needed      Leukocytosis -resolved  On admission WBC 19.5, now normal  Patient has been afebrile without any other signs or symptoms of acute infection.  Suspect secondary to small bowel obstruction    Plan:   -Monitor for signs and symptoms of infection  -daily cbc for now    Hyponatremia resolved    Left adrenal nodule  Left anterior breast mass  Assessment: seen on CT scan  Plan: Follow-up as outpatient    H/O: hysterectomy        Diet: NPO for Medical/Clinical Reasons Except for: No Exceptions    DVT Prophylaxis: Ambulate every shift  Hanna Catheter: Not present  Lines: None     Cardiac Monitoring: None  Code Status: Full Code      Clinically Significant Risk Factors         # Hypernatremia: Highest Na = 146 mmol/L in last 2 days, will monitor as appropriate          # Hypertension: Noted on problem list        # Obesity: Estimated body mass index is 31.18 kg/m  as calculated from the following:    Height as of this encounter: 1.6 m (5' 3\").    Weight as of this encounter: 79.8 kg (176 lb)., PRESENT ON ADMISSION            Disposition Plan           The patient's care " was discussed with the  entire care team .    Tena Lind CNP  Hospitalist Service  [facility]  Securely message with [employer] (more info)  Text page via [employer] Paging/Directory   ______________________________________________________________________    Interval History   No acute events overnight    Physical Exam   Vital Signs: Temp: 98.3  F (36.8  C) Temp src: Oral BP: (!) 175/74 Pulse: 70   Resp: 18 SpO2: 92 % O2 Device: None (Room air)    Weight: 176 lbs 0 oz    Gen:  Lying in bed no acute distress  HEENT:  normocephalic, atraumatic, oropharynx clear  Resp:  CTA bibasilarly  Card:  S1,S2, RRR no murmur, rub or gallop  Abd:  Soft per RN exam, non tender,  normoactive bowel sounds   Neuro:  Neuro exam non focal      Medical Decision Making       45 MINUTES SPENT BY ME on the date of service doing chart review, history, exam, documentation & further activities per the note.        Data     I have personally reviewed the following data over the past 24 hrs:    N/A  \   N/A   / N/A     146 (H) 107 25.7 (H) /  83   3.5 21 (L) 0.60 \       Imaging results reviewed over the past 24 hrs:   Recent Results (from the past 24 hour(s))   XR Gastrografin Challenge    Narrative    EXAM: XR GASTROGRAFIN CHALLENGE  LOCATION: [facility]  DATE: 1/1/2024    INDICATION: Small bowel obstruction.  COMPARISON: None.  TECHNIQUE: Routine water soluble contrast follow-through challenge.    FINDINGS:  FLUOROSCOPIC TIME: 0  NUMBER OF IMAGES: 4    Free flow of contrast through the small bowel. Contrast freely enters the colon. The small bowel is distended measuring up to 4.8 cm in greatest radial dimension consistent with a partial mechanical small bowel obstruction.      Impression    IMPRESSION: Contrast freely enters the colon.

## 2024-01-02 NOTE — PLAN OF CARE
"Goal Outcome Evaluation:      Plan of Care Reviewed With: patient        Outcome Evaluation: Patient complained of a sore throat and a new order of Lozenge was given. On a low, intermittent suction of NGT, output of 400mL,greenish drainage. Denied passing of flatus. Hypoactive bowel sounds in all four quadrants, right side is slower and has faint bowel sounds. Abdominal distention reported, tender upon palpation. PRN IV Dilaudid 0.2mg given due to abdominal cramping mostly on the upper quadrant. Infusing  NSS  at 125mL/hr. Independent in her room.     BP (!) 169/80 (BP Location: Left arm)   Pulse 82   Temp 98.6  F (37  C) (Oral)   Resp 18   Ht 1.6 m (5' 3\")   Wt 79.8 kg (176 lb)   SpO2 90%   BMI 31.18 kg/m        "

## 2024-01-02 NOTE — PROGRESS NOTES
"[facility]    Medicine Progress Note - Hospitalist Service    Date of Admission:  12/31/2023    Assessment & Plan   63 year old female with hx of +smoker,  HTN, HLD and surgical hx sig for hysterectomy admitted with likely partial small bowel obstruction.      Presented with gradually worsening  4-day history of abdominal pain and cramping and vomiting.  CT of abdomen and pelvis revealed acute mechanical bowel obstruction.  Surgery consulted by the ED provider.  NG tube placed, and made n.p.o.    She underwent gastrografin which revealed \"Free flow of contrast through the small bowel. Contrast freely enters the colon. The small bowel is distended measuring up to 4.8 cm in greatest radial dimension consistent with a partial mechanical small bowel obstruction.\"     She ultimately required the NG tube to be placed to suction due to increased abdominal discomfort on 1/1/24.  No bowel movement after gastrografin.    She will be brought to the OR this evening by General Surgery for diagnostic-possible exploratory laparotomy.    Her revised cardiac risk index is 1,  giving her a class II risk -6.0% 30 day risk of death, MI or cardiac arrest.    She does not have a primary care physician  She does have risk factors for CAD including several pack year smoking history, untreated HTN, and untreated HLD.  This afternoon sh is hypertensive at 175 to 180 systolic.  PRN hydralazine orders written.   She denies ever having chest discomfort, shortness of breath, PND or orthopnea.     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  I have compared today's ECG to a previous ECG done in 2013 which is essentially unchanged.    Patient may proceed to surgery.          Tena Lind CNP  Hospitalist Service  [facility]  Securely message with [employer] (more info)  Text page via [facility] Paging/Directory   "

## 2024-01-02 NOTE — PROGRESS NOTES
Surgery      Subjective:  Pt frustrated with lack of improvement.  No flatus or BM despite gastrograffin.  Feel distention has increased.    Objective:    Vitals:    01/01/24 2248 01/02/24 0327 01/02/24 0850 01/02/24 1053   BP: (!) 152/74 (!) 169/80 (!) 158/75 (!) 175/74   BP Location: Left arm Left arm Left arm Left arm   Patient Position: Sitting      Cuff Size: Adult Regular      Pulse: 86 82 68 70   Resp: 20 18 18 18   Temp: 97.9  F (36.6  C) 98.6  F (37  C) 98.6  F (37  C) 98.3  F (36.8  C)   TempSrc: Oral Oral Oral Oral   SpO2: 92% 90% 94% 92%   Weight:       Height:           Intake/Output Summary (Last 24 hours) at 1/2/2024 1202  Last data filed at 1/2/2024 0506  Gross per 24 hour   Intake 2731 ml   Output 2300 ml   Net 431 ml     Abd: Soft, (+)distention.  Minimal diffuse tenderness.    Results for orders placed or performed during the hospital encounter of 12/31/23 (from the past 24 hour(s))   XR Gastrografin Challenge    Narrative    EXAM: XR GASTROGRAFIN CHALLENGE  LOCATION: [facility]  DATE: 1/1/2024    INDICATION: Small bowel obstruction.  COMPARISON: None.  TECHNIQUE: Routine water soluble contrast follow-through challenge.    FINDINGS:  FLUOROSCOPIC TIME: 0  NUMBER OF IMAGES: 4    Free flow of contrast through the small bowel. Contrast freely enters the colon. The small bowel is distended measuring up to 4.8 cm in greatest radial dimension consistent with a partial mechanical small bowel obstruction.      Impression    IMPRESSION: Contrast freely enters the colon.   Magnesium   Result Value Ref Range    Magnesium 2.2 1.7 - 2.3 mg/dL   Basic metabolic panel   Result Value Ref Range    Sodium 146 (H) 135 - 145 mmol/L    Potassium 3.5 3.4 - 5.3 mmol/L    Chloride 107 98 - 107 mmol/L    Carbon Dioxide (CO2) 21 (L) 22 - 29 mmol/L    Anion Gap 18 (H) 7 - 15 mmol/L    Urea Nitrogen 25.7 (H) 8.0 - 23.0 mg/dL    Creatinine 0.60 0.51 - 0.95 mg/dL    GFR Estimate >90 >60  mL/min/1.73m2    Calcium 8.9 8.8 - 10.2 mg/dL    Glucose 83 70 - 99 mg/dL   Extra Tube    Narrative    The following orders were created for panel order Extra Tube.  Procedure                               Abnormality         Status                     ---------                               -----------         ------                     Extra Purple Top Tube[[id number]]                            Final result                 Please view results for these tests on the individual orders.   Extra Purple Top Tube   Result Value Ref Range    Hold Specimen JI        Assessment/Plan:  Pt with SBO - most likely partial.  No flatus or BM despite contrast in colon.  Due to increased distention will plan for diagnostic laparosopy, possible exploratory laparotomy.   The procedure, risks, benefits, and alternatives were discussed and the patient agrees to proceed.    Philippe Esquivel MD, FACS

## 2024-01-02 NOTE — ANESTHESIA PREPROCEDURE EVALUATION
Anesthesia Pre-Procedure Evaluation    Patient: Jinny Smith   MRN: [id number] : 1960        Procedure : Procedure(s):  LAPAROSCOPY, DIAGNOSTIC, BY GENERAL SURGERY  LAPAROTOMY, EXPLORATORY, WITH LYSIS OF ADHESIONS          Past Medical History:   Diagnosis Date    Family history of colon cancer     Hyperlipidemia LDL goal <130 2012    Hyperplastic colonic polyp     Hypertension goal BP (blood pressure) < 140/80 2012    Knee joint effusion 2012      Past Surgical History:   Procedure Laterality Date    GYN SURGERY      HYSTERECTOMY TOTAL ABDOMINAL        No Known Allergies   Social History     Tobacco Use    Smoking status: Every Day     Packs/day: .5     Types: Cigarettes    Smokeless tobacco: Former     Quit date: 2013   Substance Use Topics    Alcohol use: Yes      Wt Readings from Last 1 Encounters:   23 79.8 kg (176 lb)        Anesthesia Evaluation   Pt has had prior anesthetic. Type: General.    No history of anesthetic complications       ROS/MED HX  ENT/Pulmonary:     (+)                tobacco use, Current use,                       Neurologic:  - neg neurologic ROS     Cardiovascular:     (+)  hypertension- -   -  - -                                      METS/Exercise Tolerance:     Hematologic:       Musculoskeletal:       GI/Hepatic: Comment: Pt with SBO - most likely partial      Renal/Genitourinary:  - neg Renal ROS     Endo:  - neg endo ROS     Psychiatric/Substance Use:     (+) psychiatric history anxiety       Infectious Disease:  - neg infectious disease ROS     Malignancy:       Other:  - neg other ROS          Physical Exam    Airway        Mallampati: II   TM distance: > 3 FB   Neck ROM: full   Mouth opening: > 3 cm    Respiratory Devices and Support         Dental       (+) Minor Abnormalities - some fillings, tiny chips      Cardiovascular   cardiovascular exam normal          Pulmonary                   OUTSIDE LABS:  CBC:   Lab Results   Component Value  "Date    WBC 10.6 01/02/2024    WBC 10.5 01/01/2024    HGB 12.8 01/02/2024    HGB 12.7 01/01/2024    HCT 39.8 01/02/2024    HCT 39.7 01/01/2024     01/02/2024     01/01/2024     BMP:   Lab Results   Component Value Date     (H) 01/02/2024     01/01/2024    POTASSIUM 3.5 01/02/2024    POTASSIUM 3.6 01/01/2024    CHLORIDE 107 01/02/2024    CHLORIDE 104 01/01/2024    CO2 21 (L) 01/02/2024    CO2 23 01/01/2024    BUN 25.7 (H) 01/02/2024    BUN 24.3 (H) 01/01/2024    CR 0.60 01/02/2024    CR 0.71 01/01/2024    GLC 83 01/02/2024    GLC 81 01/01/2024     COAGS:   Lab Results   Component Value Date    INR 1.11 01/02/2024     POC: No results found for: \"BGM\", \"HCG\", \"HCGS\"  HEPATIC:   Lab Results   Component Value Date    ALBUMIN 4.5 12/31/2023    PROTTOTAL 8.3 12/31/2023    ALT 23 12/31/2023    AST 17 12/31/2023    ALKPHOS 83 12/31/2023    BILITOTAL 0.9 12/31/2023     OTHER:   Lab Results   Component Value Date    LACT 1.1 12/31/2023    A1C 5.8 08/28/2012    KAREN 8.9 01/02/2024    MAG 2.2 01/02/2024    LIPASE 18 12/31/2023    TSH 1.54 08/10/2012    CRP 7.1 08/10/2012    SED 9 08/10/2012       Anesthesia Plan    ASA Status:  2    NPO Status:  ELEVATED Aspiration Risk/Unknown (NG in place)    Anesthesia Type: General.     - Airway: ETT   Induction: Intravenous.   Maintenance: Balanced.        Consents    Anesthesia Plan(s) and associated risks, benefits, and realistic alternatives discussed. Questions answered and patient/representative(s) expressed understanding.     - Discussed: Risks, Benefits and Alternatives for BOTH SEDATION and the PROCEDURE were discussed     - Discussed with:  Patient      - Extended Intubation/Ventilatory Support Discussed: No.      - Patient is DNR/DNI Status: No     Use of blood products discussed: Yes.     - Discussed with: Patient.     Postoperative Care    Pain management: IV analgesics, Peripheral nerve block (Single Shot).   PONV prophylaxis: Ondansetron (or other " 5HT-3), Dexamethasone or Solumedrol, Background Propofol Infusion     Comments:    Other Comments: The risks and benefits of anesthesia, and the alternatives where applicable, have been discussed with the patient, and they wish to proceed.     Transverse abdominal plane block              JOANNA Gama CRNA    I have reviewed the pertinent notes and labs in the chart from the past 30 days and (re)examined the patient.  Any updates or changes from those notes are reflected in this note.

## 2024-01-03 LAB
ANION GAP SERPL CALCULATED.3IONS-SCNC: 15 MMOL/L (ref 7–15)
BASOPHILS # BLD AUTO: 0 10E3/UL (ref 0–0.2)
BASOPHILS NFR BLD AUTO: 0 %
BUN SERPL-MCNC: 22.4 MG/DL (ref 8–23)
CALCIUM SERPL-MCNC: 8 MG/DL (ref 8.8–10.2)
CHLORIDE SERPL-SCNC: 112 MMOL/L (ref 98–107)
CREAT SERPL-MCNC: 0.57 MG/DL (ref 0.51–0.95)
DEPRECATED HCO3 PLAS-SCNC: 20 MMOL/L (ref 22–29)
EGFRCR SERPLBLD CKD-EPI 2021: >90 ML/MIN/1.73M2
EOSINOPHIL # BLD AUTO: 0 10E3/UL (ref 0–0.7)
EOSINOPHIL NFR BLD AUTO: 0 %
ERYTHROCYTE [DISTWIDTH] IN BLOOD BY AUTOMATED COUNT: 12.4 % (ref 10–15)
GLUCOSE SERPL-MCNC: 89 MG/DL (ref 70–99)
HCT VFR BLD AUTO: 36.7 % (ref 35–47)
HGB BLD-MCNC: 11.4 G/DL (ref 11.7–15.7)
IMM GRANULOCYTES # BLD: 0.1 10E3/UL
IMM GRANULOCYTES NFR BLD: 1 %
LYMPHOCYTES # BLD AUTO: 1.1 10E3/UL (ref 0.8–5.3)
LYMPHOCYTES NFR BLD AUTO: 11 %
MAGNESIUM SERPL-MCNC: 2 MG/DL (ref 1.7–2.3)
MCH RBC QN AUTO: 31.8 PG (ref 26.5–33)
MCHC RBC AUTO-ENTMCNC: 31.1 G/DL (ref 31.5–36.5)
MCV RBC AUTO: 102 FL (ref 78–100)
MONOCYTES # BLD AUTO: 0.8 10E3/UL (ref 0–1.3)
MONOCYTES NFR BLD AUTO: 7 %
NEUTROPHILS # BLD AUTO: 8.7 10E3/UL (ref 1.6–8.3)
NEUTROPHILS NFR BLD AUTO: 81 %
NRBC # BLD AUTO: 0 10E3/UL
NRBC BLD AUTO-RTO: 0 /100
PLATELET # BLD AUTO: 185 10E3/UL (ref 150–450)
POTASSIUM SERPL-SCNC: 3.7 MMOL/L (ref 3.4–5.3)
RBC # BLD AUTO: 3.59 10E6/UL (ref 3.8–5.2)
SODIUM SERPL-SCNC: 147 MMOL/L (ref 135–145)
WBC # BLD AUTO: 10.7 10E3/UL (ref 4–11)

## 2024-01-03 PROCEDURE — 250N000013 HC RX MED GY IP 250 OP 250 PS 637: Performed by: SPECIALIST

## 2024-01-03 PROCEDURE — 250N000011 HC RX IP 250 OP 636: Performed by: SPECIALIST

## 2024-01-03 PROCEDURE — 36415 COLL VENOUS BLD VENIPUNCTURE: CPT | Performed by: SPECIALIST

## 2024-01-03 PROCEDURE — 258N000003 HC RX IP 258 OP 636: Performed by: SPECIALIST

## 2024-01-03 PROCEDURE — 80048 BASIC METABOLIC PNL TOTAL CA: CPT | Performed by: SPECIALIST

## 2024-01-03 PROCEDURE — 99232 SBSQ HOSP IP/OBS MODERATE 35: CPT | Performed by: NURSE PRACTITIONER

## 2024-01-03 PROCEDURE — 85025 COMPLETE CBC W/AUTO DIFF WBC: CPT | Performed by: SPECIALIST

## 2024-01-03 PROCEDURE — 120N000001 HC R&B MED SURG/OB

## 2024-01-03 PROCEDURE — 83735 ASSAY OF MAGNESIUM: CPT | Performed by: SPECIALIST

## 2024-01-03 RX ADMIN — ACETAMINOPHEN 975 MG: 325 TABLET, FILM COATED ORAL at 14:35

## 2024-01-03 RX ADMIN — SODIUM CHLORIDE: 9 INJECTION, SOLUTION INTRAVENOUS at 09:17

## 2024-01-03 RX ADMIN — HYDROMORPHONE HYDROCHLORIDE 0.2 MG: 0.2 INJECTION, SOLUTION INTRAMUSCULAR; INTRAVENOUS; SUBCUTANEOUS at 22:28

## 2024-01-03 RX ADMIN — FAMOTIDINE 20 MG: 10 INJECTION, SOLUTION INTRAVENOUS at 00:23

## 2024-01-03 RX ADMIN — ACETAMINOPHEN 975 MG: 325 TABLET, FILM COATED ORAL at 22:28

## 2024-01-03 RX ADMIN — ENOXAPARIN SODIUM 40 MG: 40 INJECTION SUBCUTANEOUS at 22:32

## 2024-01-03 RX ADMIN — ACETAMINOPHEN 975 MG: 325 TABLET, FILM COATED ORAL at 08:01

## 2024-01-03 RX ADMIN — FAMOTIDINE 20 MG: 10 INJECTION, SOLUTION INTRAVENOUS at 08:01

## 2024-01-03 RX ADMIN — FAMOTIDINE 20 MG: 10 INJECTION, SOLUTION INTRAVENOUS at 22:29

## 2024-01-03 RX ADMIN — HYDROMORPHONE HYDROCHLORIDE 0.4 MG: 0.2 INJECTION, SOLUTION INTRAMUSCULAR; INTRAVENOUS; SUBCUTANEOUS at 02:14

## 2024-01-03 RX ADMIN — HYDROMORPHONE HYDROCHLORIDE 0.2 MG: 0.2 INJECTION, SOLUTION INTRAMUSCULAR; INTRAVENOUS; SUBCUTANEOUS at 14:04

## 2024-01-03 RX ADMIN — HYDROMORPHONE HYDROCHLORIDE 0.2 MG: 0.2 INJECTION, SOLUTION INTRAMUSCULAR; INTRAVENOUS; SUBCUTANEOUS at 00:30

## 2024-01-03 RX ADMIN — SODIUM CHLORIDE: 9 INJECTION, SOLUTION INTRAVENOUS at 19:18

## 2024-01-03 ASSESSMENT — ACTIVITIES OF DAILY LIVING (ADL)
ADLS_ACUITY_SCORE: 20
ADLS_ACUITY_SCORE: 18
ADLS_ACUITY_SCORE: 20
ADLS_ACUITY_SCORE: 18
ADLS_ACUITY_SCORE: 20
ADLS_ACUITY_SCORE: 20

## 2024-01-03 NOTE — OP NOTE
[facility] Operative Note    Pre-operative diagnosis: SBO (small bowel obstruction) (H) [K56.609]   Post-operative diagnosis: Same  Internal hernia   Procedure: Procedure(s):  1.  LAPAROSCOPY, DIAGNOSTIC,   2.  Laparoscopic lysis of adhesions  3.  LAPAROTOMY, EXPLORATORY, WITH open LYSIS OF ADHESIONS,   4.  Reduction of internal hernia   Surgeon: Philippe Esquivel MD, FACS   Assistant(s): None   Anesthesia: General endotracheal anesthesia   Estimated blood loss: Less than 50 ml   Total IV fluids: (See anesthesia record)   Blood transfusion: No transfusion was given during surgery   Total urine output: (See anesthesia record)   Drains: None   Specimens: None   Implants: None   Findings: Dense pelvic adhesions to distal small bowel with resultant internal hernia.  No bowel ischemia   Complications: None   Condition: Stable   Comments: Indications for procedure: This is a 63-year lady with a history of a WM/BSO staged in the past.  He then returned with a CT scan consistent with small bowel obstruction.  She was given a Gastrografin challenge and there appeared to be contrast in the colon, however she did not pass gas or have any bowel movements as a result of this.  Her NG tube output remained elevated.  She also had increasing abdominal distention and for this reason elected the option for a diagnostic laparoscopy possible exploratory laparotomy.  See dictated operative report for full details       Details procedure:  Patient was taken the operating placed table in supine position.  After induction anesthesia the abdomen prepped and draped sterile fashion.  Timeout was performed confirm the date and the patient was a procedure performed.  A left upper quadrant incision was then made a 5 mm Visiport inserted the abdomen under direct visualization.  A generalized inspection the abdomen is then carried out.  Multiple dilated loops of bowel were seen.  The patient was but was placed in steep Trendelenburg  position.  We were unable to identify a transition point.  There were some adhesions across the midline from her previous procedures.  As elected to take these down.  There were only omentum.  A left lower quadrant 5 mm trocar was placed.  The omental adhesions were then taken down using the LigaSure device.  We then inspected the small bowel were able to find a transition point deep in the pelvis.  We attempted to reduce an internal hernia but were unsuccessful due to dense adhesions.  The adhesions cannot be adequately visualized on laparoscopy, so was elected to convert to an open procedure.  A lower midline incision was then made with a 10 blade.  Subcutaneous tissues opening using cautery.  Fascia was opened using cautery.  Upon entering the abdomen the small bowel was retracted cephalad.  The area of the transition point and then could be palpated.  Several dense adhesions were in the area.  We identified the first 1 removed which resulted in an internal hernia.  This was taken down using Metzenbaum scissors.  After reducing this we then extended more distally down the small bowel second adhesion causing a partial obstruction was identified.  This was also taken down using Metzenbaum scissors.  This was at this point we are about 10 cm from ligament of Treitz and there were no further adhesions.  The bowel was then returned to the abdomen.  It was left in place for 10 minutes by the clock.  The bowel was reinspected and found to be pink and viable without signs of any ischemia.  We able to push enteric contents distally through the bowel into the colon.  We then copiously irrigated out the abdomen.  The fascia was then closed in a running #1 PDS.  All skin incisions closed using 3-0 Vicryl and 4-0 Monocryl followed by Dermabond glue.  Patient was then taken from the operating to recovery in stable condition be readmitted to the floor.    Philippe Esquivel MD, FACS

## 2024-01-03 NOTE — PROGRESS NOTES
"[facility]    Medicine Progress Note - Hospitalist Service    Date of Admission:  12/31/2023    Assessment & Plan   Jinny Smith is a 63 year old female admitted on 12/31/2023. She has a past medical history of hypertension, hyperlipidemia-although has not been on medications for several years.  Also has a history of hysterectomy.    She presented to the ED on 12/31/2023 with 4-day history of abdominal pain and cramping.  She had only taken sips of water since 12/28, but then this morning started vomiting.  Last bowel movement Friday 12/29 and it was normal.     Workup in the ED included CT of abdomen and pelvis revealing acute mechanical small bowel obstruction with transition point in the right lower quadrant pelvis.  Also noted to have indeterminate left adrenal nodule and indeterminate left anterior breast mass.  Lab work indicated mild hyponatremia with sodium of 133, elevated anion gap at 19, BUN elevated at 26.2, elevated WBC at 19.5, with an elevated hemoglobin of 16.1.  Lactic acid normal at 1.1     She was admitted as an inpatient due to an acute small bowel obstruction.     SBO (small bowel obstruction)  Presented with gradually worsening  4-day history of abdominal pain and cramping.  She had vomiting the am of admission.   CT of abdomen and pelvis revealed acute mechanical bowel obstruction.  Surgery consulted by the ED provider.  NG tube placed, recommend n.p.o.    She underwent gastrografin which revealed \"Free flow of contrast through the small bowel. Contrast freely enters the colon. The small bowel is distended measuring up to 4.8 cm in greatest radial dimension consistent with a partial mechanical small bowel obstruction.\"   She ultimately required the NG tube to be placed to suction due to increased abdominal discomfort on 1/1/24.  No bowel movement after gastrografin.       1/3/24 POD 1 s/p exploratory laparotomy with open lysis of adhesions and reduction of " internal hernia under general anesthesia.  She tolerated he procedure well.  NG is clamped and tolerating sips.  Incision c/d/I.  No fevers.  Tolerating pulmonary hygiene.  No nausea or abdominal discomfort.       Plan:   - Per general surgery, NG clamped and sips as clear as tolerated  -IV crystalloid at 100 cc an hour  -Hydromorphone as needed for pain       Hypertension goal BP (blood pressure) < 140/80   Has a history of hypertension, but currently not taking any medications.  Has not followed up with a primary care provider for several years.  Was hypertensive with discomfort better on POD #1.  Will trend and likely start an oral agent as she has not been seen in primary care for 8 years.  I discussed with her the risks of long standing hypertension    Plan:  Hydralazine as needed for systolic blood pressure greater than 175       Tobacco use disorder  Patient reports she smokes a half a pack or less of cigarettes a day.  She has not smoked since 12/28.  Has not noted any symptoms of acute nicotine withdrawal    Plan:  nicotine replacement if needed       Leukocytosis -resolved  On admission WBC 19.5, now normal  Patient has been afebrile without any other signs or symptoms of acute infection.  No fevers.      Plan:   -daily cbc for now     Hyponatremia resolved     Left adrenal nodule  Left anterior breast mass  Assessment: seen on CT scan  Plan: Follow-up as outpatient     H/O: hysterectomy          Diet: NPO for Medical/Clinical Reasons Except for: Ice Chips, Meds    DVT Prophylaxis: Enoxaparin (Lovenox) SQ  Hanna Catheter: Not present  Lines: None     Cardiac Monitoring: None  Code Status: Full Code      Clinically Significant Risk Factors         # Hypernatremia: Highest Na = 147 mmol/L in last 2 days, will monitor as appropriate          # Hypertension: Noted on problem list        # Obesity: Estimated body mass index is 31.18 kg/m  as calculated from the following:    Height as of this encounter: 1.6 m  "(5' 3\").    Weight as of this encounter: 79.8 kg (176 lb)., PRESENT ON ADMISSION            Disposition Plan      Expected Discharge Date: 01/06/2024        Discharge Comments: going to surgery 1/2/24          The patient's care was discussed with the  entire care team .    Tena Lind CNP  Hospitalist Service  [facility]  Securely message with [facility] (more info)  Text page via [email or web address] Paging/Directory   ______________________________________________________________________    Interval History   Patient brought to the OR last evening for exploratory laparotomy and open lysis of adhesions.  Doing well post operatively.    Physical Exam   Vital Signs: Temp: 97.9  F (36.6  C) Temp src: Oral BP: (!) 154/68 Pulse: 73   Resp: 20 SpO2: 94 % O2 Device: None (Room air) Oxygen Delivery: 2 LPM  Weight: 176 lbs 0 oz    Gen:  Lying in bed no acute distress, pleasant and cooperative  HEENT:  normocephalic, atraumatic, oropharynx clear  Resp:  CTA  Card:  S1,S2, RRR no murmur, rub or gallop  Abd:  Soft per RN exam, non tender,  normoactive bowel sounds   Neuro:  Neuro exam non focal      Medical Decision Making       45 MINUTES SPENT BY ME on the date of service doing chart review, history, exam, documentation & further activities per the note.        Data     I have personally reviewed the following data over the past 24 hrs:    10.7  \   11.4 (L)   / 185     147 (H) 112 (H) 22.4 /  89   3.7 20 (L) 0.57 \       Imaging results reviewed over the past 24 hrs:   No results found for this or any previous visit (from the past 24 hour(s)).  "

## 2024-01-03 NOTE — ANESTHESIA PROCEDURE NOTES
TAP Procedure Note    Pre-Procedure   Staff -        Performed By: CRNA       Location: OR       Procedure Start/Stop Times: 1/2/2024 8:43 AM and 1/2/2024 8:59 AM       Pre-Anesthestic Checklist: patient identified, IV checked, site marked, risks and benefits discussed, informed consent, monitors and equipment checked, pre-op evaluation, at physician/surgeon's request and post-op pain management  Timeout:       Correct Patient: Yes        Correct Procedure: Yes        Correct Site: Yes        Correct Position: Yes        Correct Laterality: Yes        Site Marked: Yes  Procedure Documentation  Procedure: TAP       Laterality: bilateral       Patient Position: supine       Patient Prep/Sterile Barriers: sterile gloves, mask       Skin prep: Chloraprep       Needle Type: insulated       Needle Gauge: 22.        Needle Length (millimeters): 100        Ultrasound guided       1. Ultrasound was used to identify targeted nerve, plexus, vascular marker, or fascial plane and place a needle adjacent to it in real-time.       2. Ultrasound was used to visualize the spread of anesthetic in close proximity to the above referenced structure.       3. A permanent image is entered into the patient's record.       4. The visualized anatomic structures appeared normal.       5. There were no apparent abnormal pathologic findings.    Assessment/Narrative         The placement was negative for: blood aspirated, painful injection and site bleeding       Paresthesias: No.       Test dose of mL at.         Test dose negative, 3 minutes after injection, for signs of intravascular, subdural, or intrathecal injection.       Bolus given via needle..        Secured via.        Insertion/Infusion Method: Single Shot       Complications: none       Injection made incrementally with aspirations every 5 mL.    Medication(s) Administered   Medication Administration Time: 1/2/2024 8:43 AM     Comments:  Pt tolerated the procedure well as under  "General Anesthesia.  There was good visualization of the space between the internal oblique and the transverses abdominis.  There was also good visualization of fluid dissection in this layer.  No complications were noted.  I will follow up with this pt if needed.      FOR [facility] ([facility]) ONLY:   Pain Team Contact information: please page the Pain Team Via [employer]. Search \"Pain\". During daytime hours, please page the attending first. At night please page the resident first.      "

## 2024-01-03 NOTE — ANESTHESIA PROCEDURE NOTES
Airway       Patient location during procedure: OR       Procedure Start/Stop Times: 1/2/2024 7:22 PM  Staff -        CRNA: Galo Freeman APRN CRNA       Performed By: CRNA  Consent for Airway        Urgency: elective  Indications and Patient Condition       Indications for airway management: [place]-procedural       Induction type:RSI       Mask difficulty assessment: 0 - not attempted    Final Airway Details       Final airway type: endotracheal airway       Successful airway: ETT - single  Endotracheal Airway Details        ETT size (mm): 6.5       Cuffed: yes       Successful intubation technique: video laryngoscopy       VL Blade Size: Glidescope 3       Grade View of Cords: 1       Adjucts: stylet       Position: Left       Measured from: lips       Secured at (cm): 22    Post intubation assessment        Placement verified by: capnometry, equal breath sounds and chest rise        Number of attempts at approach: 1       Number of other approaches attempted: 0       Secured with: plastic tape       Ease of procedure: easy       Dentition: Intact and Unchanged    Medication(s) Administered   Medication Administration Time: 1/2/2024 7:22 PM

## 2024-01-03 NOTE — PROGRESS NOTES
S- Transfer to Pre Op from M/S 255.    B- SBO    A- Brief systems assessment: A&Ox4. NG to low intermittent suction. PRN 0.2 Dilaudid given for abdominal pain. Pt hypertensive 180s systolic-  anesthesia aware.     R- Transfer to pre-op per physician orders. Continue to monitor pt and update physician as needed.      Code status: Full Code  Skin: WDL  Isolation and Signage: None  Medication drips upon transfer:  ml/hr

## 2024-01-03 NOTE — PROGRESS NOTES
Surgery postop day #1    Subjective:  Patient is feeling much better this morning.  No flatus.  Does want some clears and Sprite.      Objective:    Vitals:    01/03/24 0229 01/03/24 0530 01/03/24 0632 01/03/24 0722   BP:  (!) 149/70  (!) 150/55   BP Location:  Left arm  Left arm   Patient Position:  Semi-Oden's     Cuff Size:  Adult Regular     Pulse:  65  79   Resp:  24  20   Temp:  98.2  F (36.8  C)  98.5  F (36.9  C)   TempSrc:  Oral  Oral   SpO2: 95% 96% 95% 92%   Weight:       Height:           Intake/Output Summary (Last 24 hours) at 1/3/2024 0739  Last data filed at 1/3/2024 0624  Gross per 24 hour   Intake 2934 ml   Output 1515 ml   Net 1419 ml     NGT - 100 last shift    Abd: soft, less distended    Results for orders placed or performed during the hospital encounter of 12/31/23 (from the past 24 hour(s))   INR   Result Value Ref Range    INR 1.11 0.85 - 1.15   CBC with platelets differential    Narrative    The following orders were created for panel order CBC with platelets differential.  Procedure                               Abnormality         Status                     ---------                               -----------         ------                     CBC with platelets and d...[[id number]]  Abnormal            Final result                 Please view results for these tests on the individual orders.   Magnesium   Result Value Ref Range    Magnesium 2.0 1.7 - 2.3 mg/dL   Basic metabolic panel   Result Value Ref Range    Sodium 147 (H) 135 - 145 mmol/L    Potassium 3.7 3.4 - 5.3 mmol/L    Chloride 112 (H) 98 - 107 mmol/L    Carbon Dioxide (CO2) 20 (L) 22 - 29 mmol/L    Anion Gap 15 7 - 15 mmol/L    Urea Nitrogen 22.4 8.0 - 23.0 mg/dL    Creatinine 0.57 0.51 - 0.95 mg/dL    GFR Estimate >90 >60 mL/min/1.73m2    Calcium 8.0 (L) 8.8 - 10.2 mg/dL    Glucose 89 70 - 99 mg/dL   CBC with platelets and differential   Result Value Ref Range    WBC Count 10.7 4.0 - 11.0 10e3/uL    RBC Count 3.59 (L) 3.80 -  5.20 10e6/uL    Hemoglobin 11.4 (L) 11.7 - 15.7 g/dL    Hematocrit 36.7 35.0 - 47.0 %     (H) 78 - 100 fL    MCH 31.8 26.5 - 33.0 pg    MCHC 31.1 (L) 31.5 - 36.5 g/dL    RDW 12.4 10.0 - 15.0 %    Platelet Count 185 150 - 450 10e3/uL    % Neutrophils 81 %    % Lymphocytes 11 %    % Monocytes 7 %    % Eosinophils 0 %    % Basophils 0 %    % Immature Granulocytes 1 %    NRBCs per 100 WBC 0 <1 /100    Absolute Neutrophils 8.7 (H) 1.6 - 8.3 10e3/uL    Absolute Lymphocytes 1.1 0.8 - 5.3 10e3/uL    Absolute Monocytes 0.8 0.0 - 1.3 10e3/uL    Absolute Eosinophils 0.0 0.0 - 0.7 10e3/uL    Absolute Basophils 0.0 0.0 - 0.2 10e3/uL    Absolute Immature Granulocytes 0.1 <=0.4 10e3/uL    Absolute NRBCs 0.0 10e3/uL     Assessment/plan:  Patient is status post exploratory laparotomy and lysis of adhesions.  Much improved this morning.  NG tube output much decreased.  Good urine output.  Still with postop ileus.  Will clamp NG tube and start sips of clears.  DC Hanna.  Out of bed ambulate.  Await resolution of ileus.    Philippe Esquivel MD, FACS

## 2024-01-03 NOTE — ANESTHESIA CARE TRANSFER NOTE
Patient: Jinny Smith    Procedure: Procedure(s):  LAPAROSCOPY, DIAGNOSTIC, laparoscopic lysis of adhesions  LAPAROTOMY, EXPLORATORY, WITH open LYSIS OF ADHESIONS, reduction of internal hernia       Diagnosis: SBO (small bowel obstruction) (H) [K56.609]  Diagnosis Additional Information: No value filed.    Anesthesia Type:   General     Note:    Oropharynx: oropharynx clear of all foreign objects and spontaneously breathing  Level of Consciousness: drowsy  Oxygen Supplementation: face mask  Level of Supplemental Oxygen (L/min / FiO2): 6  Independent Airway: airway patency satisfactory and stable  Dentition: dentition unchanged  Vital Signs Stable: post-procedure vital signs reviewed and stable  Report to RN Given: handoff report given  Patient transferred to: PACU    Handoff Report: Identifed the Patient, Identified the Reponsible Provider, Reviewed the pertinent medical history, Discussed the surgical course, Reviewed Intra-OP anesthesia mangement and issues during anesthesia, Set expectations for post-procedure period and Allowed opportunity for questions and acknowledgement of understanding    Vitals:  Vitals Value Taken Time   /76 01/02/24 2201   Temp 99.32  F (37.4  C) 01/02/24 2206   Pulse 71 01/02/24 2206   Resp 25 01/02/24 2206   SpO2 92 % 01/02/24 2206   Vitals shown include unfiled device data.    Electronically Signed By: JOANNA Gama CRNA  January 2, 2024  10:06 PM

## 2024-01-03 NOTE — PROGRESS NOTES
"S- Transfer to [facility] floor room 255 from PACU.    B- Laparoscopy    A- Brief systems assessment: A&O x 4, on an NGT, low,  intermittent suction, no drainage as of this time. Abdominal incisions, surgical glued, CDI. Pt reported a tolerable abdominal pain, ice pack on the surgical site. Hanna catheter on and patent.  On a capnography, EtCo2:30, SPO2 is 94% on a  4L/min of O2 via NC.     BP (!) 164/72 (BP Location: Left arm, Patient Position: Semi-Oden's, Cuff Size: Adult Regular)   Pulse 75   Temp 98.2  F (36.8  C) (Oral)   Resp 23   Ht 1.6 m (5' 3\")   Wt 79.8 kg (176 lb)   SpO2 94%   BMI 31.18 kg/m        R- Transfer to [facility] floor per physician orders. Continue to monitor pt and update physician as needed.     Code status: Full Code  Skin: Abdominal incision, vertical; 3 laparoscopic site, but was not proceed.  Fall Risk: Yes- Department fall risk interventions implemented.  Isolation and Signage: None  Medication drips upon transfer: none  Blue Bin checked and medications transfer out with patientN/AABLE\"}      "

## 2024-01-03 NOTE — PLAN OF CARE
"Goal Outcome Evaluation:      Plan of Care Reviewed With: (P) patient    Overall Patient Progress: (P) no changeOverall Patient Progress: (P) no change      Outcome Evaluation: NPO except ice ships. PRN IV Dilaudid 0.2mg and 0.4mg utilized due to burning pain on the surgical site. Abdominal incision is CDI. Hanna cath patent, yielding adequate amount of urine, see flowsheet. On an NGT, low, intermittent suction, 50mL greenish drainage. Denied passing gas. Hypoactive bowel sounds. Uses the incentive spirometry, reached 1750mL. NSS running at 100mL/hr. Ambulated to the bathroom, brushed her teeth and  ambulated in the hallway as well this shift ,75ft.    /65 (BP Location: Left arm, Patient Position: Semi-Oden's, Cuff Size: Adult Regular)   Pulse 75   Temp 98.5  F (36.9  C) (Oral)   Resp 22   Ht 1.6 m (5' 3\")   Wt 79.8 kg (176 lb)   SpO2 95%   BMI 31.18 kg/m          "

## 2024-01-03 NOTE — OR NURSING
Transfer from  pacu to Room back to [address]  Transferred via own bed    S: 64 y/o f  S/P laparoscopic lysis of adhesions       Anesthesia Type:  general       Surgeon:  Dr. weinstein       Allergies:  See Medication Reconciliation Record       DNR: no      B:  Pertinent Medical History: admit to hospital on 12/31 with abdominal pain, found to have a small bowel obstruction, ngt placed, with minimal resolution,thus surgery planned and commenced this evening.  Past Medical History:   Diagnosis Date    Family history of colon cancer     Hyperlipidemia LDL goal <130 8/28/2012    Hyperplastic colonic polyp     Hypertension goal BP (blood pressure) < 140/80 8/28/2012    Knee joint effusion 8/28/2012            Surgical History:    Past Surgical History:   Procedure Laterality Date    GYN SURGERY      HYSTERECTOMY TOTAL ABDOMINAL        A:  EBL: 10        IVF:  800 ml NS        UOP:  urinary catheter         NPO:  Yes x-cept ice chips        Vomiting: No         Drainage: NGT to low intermittent suction with small amount of mena yellow color         Skin Integrity: new abdomen vertical incision        RFO: Yes NGT and urinary catheter        Brace/sling/equipment:  Yes: pneumatic compression sleeves       Pain: managed with fentanyl 100 mcg       CMS: alert and oriented moves all extremities       See PACU record for ongoing assessment, vital signs and pain assessment.     Report given to Tenisha GILMORE on med-surg unit, transported with oxygen on 2L per NC all lines intact  R: Post-Op vitals and assessments as ordered/indicated per patient's condition.       Follow Post-Op orders and notify Physician prn.       Continue to involve patient/family in plan of care and discharge planning.       Reinforce Pre-Operative education.       Implement skin safety interventions as appropriate.    Name: Hany GILMORE

## 2024-01-03 NOTE — ANESTHESIA POSTPROCEDURE EVALUATION
Patient: Jinny Smith    Procedure: Procedure(s):  LAPAROSCOPY, DIAGNOSTIC, laparoscopic lysis of adhesions  LAPAROTOMY, EXPLORATORY, WITH open LYSIS OF ADHESIONS, reduction of internal hernia       Anesthesia Type:  General    Note:  Disposition: Outpatient   Postop Pain Control: Uneventful            Sign Out: Well controlled pain   PONV: No   Neuro/Psych: Uneventful            Sign Out: Acceptable/Baseline neuro status   Airway/Respiratory: Uneventful            Sign Out: Acceptable/Baseline resp. status   CV/Hemodynamics: Uneventful            Sign Out: Acceptable CV status   Other NRE: NONE   DID A NON-ROUTINE EVENT OCCUR? No    Event details/Postop Comments:  Pt was happy with anesthesia care.  No complications.  I will follow up with the pt if needed.           Last vitals:  Vitals Value Taken Time   /71 01/02/24 2215   Temp 99.5  F (37.5  C) 01/02/24 2218   Pulse 71 01/02/24 2220   Resp 24 01/02/24 2220   SpO2 92 % 01/02/24 2220   Vitals shown include unfiled device data.    Electronically Signed By: JOANNA Pope CRNA  January 3, 2024  3:04 PM

## 2024-01-04 LAB
ANION GAP SERPL CALCULATED.3IONS-SCNC: 17 MMOL/L (ref 7–15)
BUN SERPL-MCNC: 16.5 MG/DL (ref 8–23)
CALCIUM SERPL-MCNC: 8.3 MG/DL (ref 8.8–10.2)
CHLORIDE SERPL-SCNC: 107 MMOL/L (ref 98–107)
CREAT SERPL-MCNC: 0.56 MG/DL (ref 0.51–0.95)
DEPRECATED HCO3 PLAS-SCNC: 18 MMOL/L (ref 22–29)
EGFRCR SERPLBLD CKD-EPI 2021: >90 ML/MIN/1.73M2
ERYTHROCYTE [DISTWIDTH] IN BLOOD BY AUTOMATED COUNT: 12.2 % (ref 10–15)
GLUCOSE SERPL-MCNC: 73 MG/DL (ref 70–99)
HCT VFR BLD AUTO: 37.4 % (ref 35–47)
HGB BLD-MCNC: 12.1 G/DL (ref 11.7–15.7)
MAGNESIUM SERPL-MCNC: 1.9 MG/DL (ref 1.7–2.3)
MCH RBC QN AUTO: 32.5 PG (ref 26.5–33)
MCHC RBC AUTO-ENTMCNC: 32.4 G/DL (ref 31.5–36.5)
MCV RBC AUTO: 101 FL (ref 78–100)
PLATELET # BLD AUTO: 191 10E3/UL (ref 150–450)
POTASSIUM SERPL-SCNC: 3.4 MMOL/L (ref 3.4–5.3)
POTASSIUM SERPL-SCNC: 3.6 MMOL/L (ref 3.4–5.3)
RBC # BLD AUTO: 3.72 10E6/UL (ref 3.8–5.2)
SODIUM SERPL-SCNC: 142 MMOL/L (ref 135–145)
WBC # BLD AUTO: 9.5 10E3/UL (ref 4–11)

## 2024-01-04 PROCEDURE — 120N000001 HC R&B MED SURG/OB

## 2024-01-04 PROCEDURE — 99232 SBSQ HOSP IP/OBS MODERATE 35: CPT | Performed by: NURSE PRACTITIONER

## 2024-01-04 PROCEDURE — 250N000013 HC RX MED GY IP 250 OP 250 PS 637: Performed by: NURSE PRACTITIONER

## 2024-01-04 PROCEDURE — 84132 ASSAY OF SERUM POTASSIUM: CPT | Performed by: NURSE PRACTITIONER

## 2024-01-04 PROCEDURE — 250N000011 HC RX IP 250 OP 636: Performed by: SPECIALIST

## 2024-01-04 PROCEDURE — 258N000003 HC RX IP 258 OP 636: Performed by: SPECIALIST

## 2024-01-04 PROCEDURE — 85027 COMPLETE CBC AUTOMATED: CPT | Performed by: NURSE PRACTITIONER

## 2024-01-04 PROCEDURE — 83735 ASSAY OF MAGNESIUM: CPT | Performed by: NURSE PRACTITIONER

## 2024-01-04 PROCEDURE — 80048 BASIC METABOLIC PNL TOTAL CA: CPT | Performed by: NURSE PRACTITIONER

## 2024-01-04 PROCEDURE — 36415 COLL VENOUS BLD VENIPUNCTURE: CPT | Performed by: NURSE PRACTITIONER

## 2024-01-04 PROCEDURE — 250N000013 HC RX MED GY IP 250 OP 250 PS 637: Performed by: INTERNAL MEDICINE

## 2024-01-04 PROCEDURE — 250N000013 HC RX MED GY IP 250 OP 250 PS 637: Performed by: SPECIALIST

## 2024-01-04 RX ORDER — LISINOPRIL 2.5 MG/1
5 TABLET ORAL DAILY
Status: DISCONTINUED | OUTPATIENT
Start: 2024-01-04 | End: 2024-01-05

## 2024-01-04 RX ORDER — POTASSIUM CHLORIDE 1500 MG/1
40 TABLET, EXTENDED RELEASE ORAL ONCE
Status: COMPLETED | OUTPATIENT
Start: 2024-01-04 | End: 2024-01-04

## 2024-01-04 RX ORDER — HYDROMORPHONE HCL IN WATER/PF 6 MG/30 ML
0.4 PATIENT CONTROLLED ANALGESIA SYRINGE INTRAVENOUS EVERY 4 HOURS PRN
Status: DISCONTINUED | OUTPATIENT
Start: 2024-01-04 | End: 2024-01-05 | Stop reason: HOSPADM

## 2024-01-04 RX ORDER — OXYCODONE HYDROCHLORIDE 5 MG/1
5 TABLET ORAL EVERY 4 HOURS PRN
Status: DISCONTINUED | OUTPATIENT
Start: 2024-01-04 | End: 2024-01-05 | Stop reason: HOSPADM

## 2024-01-04 RX ADMIN — FAMOTIDINE 20 MG: 20 TABLET ORAL at 08:47

## 2024-01-04 RX ADMIN — ACETAMINOPHEN 975 MG: 325 TABLET, FILM COATED ORAL at 15:56

## 2024-01-04 RX ADMIN — POTASSIUM CHLORIDE 40 MEQ: 1500 TABLET, EXTENDED RELEASE ORAL at 08:47

## 2024-01-04 RX ADMIN — FAMOTIDINE 20 MG: 20 TABLET ORAL at 20:39

## 2024-01-04 RX ADMIN — SODIUM CHLORIDE: 9 INJECTION, SOLUTION INTRAVENOUS at 17:32

## 2024-01-04 RX ADMIN — ENOXAPARIN SODIUM 40 MG: 40 INJECTION SUBCUTANEOUS at 17:43

## 2024-01-04 RX ADMIN — ACETAMINOPHEN 975 MG: 325 TABLET, FILM COATED ORAL at 06:27

## 2024-01-04 RX ADMIN — ACETAMINOPHEN 975 MG: 325 TABLET, FILM COATED ORAL at 22:41

## 2024-01-04 RX ADMIN — LISINOPRIL 5 MG: 2.5 TABLET ORAL at 15:57

## 2024-01-04 RX ADMIN — SODIUM CHLORIDE: 9 INJECTION, SOLUTION INTRAVENOUS at 05:04

## 2024-01-04 RX ADMIN — OXYCODONE HYDROCHLORIDE 5 MG: 5 TABLET ORAL at 20:27

## 2024-01-04 RX ADMIN — HYDRALAZINE HYDROCHLORIDE 10 MG: 20 INJECTION INTRAMUSCULAR; INTRAVENOUS at 20:33

## 2024-01-04 ASSESSMENT — ACTIVITIES OF DAILY LIVING (ADL)
ADLS_ACUITY_SCORE: 20

## 2024-01-04 NOTE — PLAN OF CARE
Goal Outcome Evaluation:      Plan of Care Reviewed With: patient    Overall Patient Progress: improvingOverall Patient Progress: improving    Outcome Evaluation: A&O. VSS on rm air. NG clamped. Bowel sounds hypoactive. Not passing gas yet. Voiding adequately since catheter removal. Ambulating independently. NS running at 100mL/hr.

## 2024-01-04 NOTE — PROGRESS NOTES
"[facility]    Medicine Progress Note - Hospitalist Service    Date of Admission:  12/31/2023    Assessment & Plan   Jinny Smith is a 63 year old female admitted on 12/31/2023. She has a past medical history of hypertension, hyperlipidemia-although has not been on medications for several years.  Also has a history of hysterectomy.    She presented to the ED on 12/31/2023 with 4-day history of abdominal pain and cramping.  She had only taken sips of water since 12/28, but then this morning started vomiting.  Last bowel movement Friday 12/29 and it was normal.     Workup in the ED included CT of abdomen and pelvis revealing acute mechanical small bowel obstruction with transition point in the right lower quadrant pelvis.  Also noted to have indeterminate left adrenal nodule and indeterminate left anterior breast mass.  Lab work indicated mild hyponatremia with sodium of 133, elevated anion gap at 19, BUN elevated at 26.2, elevated WBC at 19.5, with an elevated hemoglobin of 16.1.  Lactic acid normal at 1.1     She was admitted as an inpatient due to an acute small bowel obstruction.     SBO (small bowel obstruction)  Presented with gradually worsening  4-day history of abdominal pain and cramping.  She had vomiting the am of admission.   CT of abdomen and pelvis revealed acute mechanical bowel obstruction.  Surgery consulted by the ED provider.  NG tube placed, recommend n.p.o.    She underwent gastrografin which revealed \"Free flow of contrast through the small bowel. Contrast freely enters the colon. The small bowel is distended measuring up to 4.8 cm in greatest radial dimension consistent with a partial mechanical small bowel obstruction.\"   She ultimately required the NG tube to be placed to suction due to increased abdominal discomfort on 1/1/24.  No bowel movement after gastrografin.        1/3/24 POD 1 s/p exploratory laparotomy with open lysis of adhesions and reduction of " internal hernia under general anesthesia.  She tolerated he procedure well.  NG is clamped and tolerating sips.  Incision c/d/I.  No fevers.  Tolerating pulmonary hygiene.  No nausea or abdominal discomfort.    1/4/24 POD 2 1 s/p exploratory laparotomy with open lysis of adhesions and reduction of internal hernia under general anesthesia.  She tolerated he procedure well.  NG is clamped and has tolerated clear.  She will be advancing diet today per general surgery.  Incision c/d/I.  No fevers.  Tolerating pulmonary hygiene.  No nausea or abdominal discomfort.       Plan:   - Per general surgery, NG clamped and advancing diet as they have ordered  -decrease crystalloid  -Hydromorphone as needed for pain        Hypertension goal BP (blood pressure) < 140/80   Has a history of hypertension, but currently not taking any medications.  Has not followed up with a primary care provider for several years.  Was hypertensive with discomfort better on POD #1.  Will trend and likely start an oral agent as she has not been seen in primary care for 8 years.  I discussed with her the risks of long standing hypertension.  Blood pressures still >140 systolic.    Plan:  start lisinopril today at 5 mg daily.         Tobacco use disorder  Patient reports she smokes a half a pack or less of cigarettes a day.  She has not smoked since 12/28.  Has not noted any symptoms of acute nicotine withdrawal    Plan:  nicotine replacement if needed       Leukocytosis -resolved  On admission WBC 19.5, now normal  Patient has been afebrile without any other signs or symptoms of acute infection.  No fevers.      Plan:   -daily cbc for now     Hyponatremia resolved     Left adrenal nodule  Left anterior breast mass  Assessment: seen on CT scan  Plan: Follow-up as outpatient     H/O: hysterectomy        Diet: Advance Diet as Tolerated: Full Liquid Diet    DVT Prophylaxis: Enoxaparin (Lovenox) SQ  Hanna Catheter: Not present  Lines: None     Cardiac  "Monitoring: None  Code Status: Full Code      Clinically Significant Risk Factors         # Hypernatremia: Highest Na = 147 mmol/L in last 2 days, will monitor as appropriate          # Hypertension: Noted on problem list        # Obesity: Estimated body mass index is 31.18 kg/m  as calculated from the following:    Height as of this encounter: 1.6 m (5' 3\").    Weight as of this encounter: 79.8 kg (176 lb)., PRESENT ON ADMISSION            Disposition Plan     Expected Discharge Date: 01/06/2024        Discharge Comments: going to surgery 1/2/24          The patient's care was discussed with the  entire care team .    Tena Lind CNP  Hospitalist Service  [facility]  Securely message with [employer] (more info)  Text page via [employer] Paging/Directory   ______________________________________________________________________    Interval History   No acute events overnight    Physical Exam   Vital Signs: Temp: 98.8  F (37.1  C) Temp src: Oral BP: (!) 182/85 Pulse: 74   Resp: 17 SpO2: 92 % O2 Device: None (Room air)    Weight: 176 lbs 0 oz    Gen:  Lying in bed no acute distress  HEENT:  normocephalic, atraumatic, oropharynx clear  Resp:  CTA posteriorly, normal respiratory effort  Card:  S1,S2, RRR no murmur, rub or gallop  Abd:  Soft, nondistended, non tender,  normoactive bowel sounds, abdominal incision c/d/i  Neuro:  Neuro exam grossly non focal     Medical Decision Making       45 MINUTES SPENT BY ME on the date of service doing chart review, history, exam, documentation & further activities per the note.        Data     I have personally reviewed the following data over the past 24 hrs:    9.5  \   12.1   / 191     142 107 16.5 /  73   3.6 18 (L) 0.56 \       Imaging results reviewed over the past 24 hrs:   No results found for this or any previous visit (from the past 24 hour(s)).  "

## 2024-01-04 NOTE — PROGRESS NOTES
Surgery postop day #2    Subjective:  Patient is feeling much better this morning.  (+) flatus.  Tolerated sips of clears and NG tube clamped.      Objective:    Vitals:    01/03/24 1542 01/03/24 1932 01/03/24 2236 01/04/24 0807   BP: (!) 153/74 (!) 168/78 (!) 151/77 (!) 182/85   BP Location: Left arm Left arm Right arm Right arm   Patient Position:  Sitting Semi-Oden's    Cuff Size:  Adult Regular Adult Regular    Pulse: 72 84 86 74   Resp: 22 20 20 17   Temp: 98.5  F (36.9  C) 98.2  F (36.8  C) 98.5  F (36.9  C) 98.8  F (37.1  C)   TempSrc: Oral Oral Oral Oral   SpO2: 93% 92% 92% 92%   Weight:       Height:           Intake/Output Summary (Last 24 hours) at 1/4/2024 1212  Last data filed at 1/4/2024 0505  Gross per 24 hour   Intake 2528 ml   Output 900 ml   Net 1628 ml         Abd: soft, less distended    Results for orders placed or performed during the hospital encounter of 12/31/23 (from the past 24 hour(s))   Magnesium   Result Value Ref Range    Magnesium 1.9 1.7 - 2.3 mg/dL   CBC with platelets   Result Value Ref Range    WBC Count 9.5 4.0 - 11.0 10e3/uL    RBC Count 3.72 (L) 3.80 - 5.20 10e6/uL    Hemoglobin 12.1 11.7 - 15.7 g/dL    Hematocrit 37.4 35.0 - 47.0 %     (H) 78 - 100 fL    MCH 32.5 26.5 - 33.0 pg    MCHC 32.4 31.5 - 36.5 g/dL    RDW 12.2 10.0 - 15.0 %    Platelet Count 191 150 - 450 10e3/uL   Basic metabolic panel   Result Value Ref Range    Sodium 142 135 - 145 mmol/L    Potassium 3.4 3.4 - 5.3 mmol/L    Chloride 107 98 - 107 mmol/L    Carbon Dioxide (CO2) 18 (L) 22 - 29 mmol/L    Anion Gap 17 (H) 7 - 15 mmol/L    Urea Nitrogen 16.5 8.0 - 23.0 mg/dL    Creatinine 0.56 0.51 - 0.95 mg/dL    GFR Estimate >90 >60 mL/min/1.73m2    Calcium 8.3 (L) 8.8 - 10.2 mg/dL    Glucose 73 70 - 99 mg/dL     Assessment/plan:  Patient is status post exploratory laparotomy and lysis of adhesions.  Much improved this morning.  Tolerated NG tube clamping and sips of clears.  Will continue clamping NG tube  and start clear liquid diet with ADA T.  Patient was given option of removing NG tube but preferred to wait until she is tolerating a diet.  Out of bed ambulate.  Await resolution of ileus.    Philippe Esquivel MD, FACS

## 2024-01-04 NOTE — PLAN OF CARE
"Goal Outcome Evaluation:      Plan of Care Reviewed With: patient    Overall Patient Progress: improvingOverall Patient Progress: improving    Outcome Evaluation: POD 1. Pt has had NG clamped since 0745. No Nausea/vomiting/bloating. Pt is tolerating sips of clear liquids throughout the day. Hanna pulled at 0745 and voiding well. Walking the halls independent. PRN 0.2 Diludid used x1. IS to 1750. VSS on RA. LS clear. Pt denies passing gas.  ml/hr.    BP (!) 153/74 (BP Location: Left arm)   Pulse 72   Temp 98.5  F (36.9  C) (Oral)   Resp 22   Ht 1.6 m (5' 3\")   Wt 79.8 kg (176 lb)   SpO2 93%   BMI 31.18 kg/m      "

## 2024-01-05 VITALS
RESPIRATION RATE: 18 BRPM | SYSTOLIC BLOOD PRESSURE: 164 MMHG | HEIGHT: 63 IN | HEART RATE: 63 BPM | TEMPERATURE: 98.4 F | DIASTOLIC BLOOD PRESSURE: 81 MMHG | WEIGHT: 176 LBS | BODY MASS INDEX: 31.18 KG/M2 | OXYGEN SATURATION: 95 %

## 2024-01-05 LAB
HOLD SPECIMEN: NORMAL
MAGNESIUM SERPL-MCNC: 1.8 MG/DL (ref 1.7–2.3)
POTASSIUM SERPL-SCNC: 3.2 MMOL/L (ref 3.4–5.3)

## 2024-01-05 PROCEDURE — 250N000013 HC RX MED GY IP 250 OP 250 PS 637: Performed by: NURSE PRACTITIONER

## 2024-01-05 PROCEDURE — 250N000013 HC RX MED GY IP 250 OP 250 PS 637: Performed by: SPECIALIST

## 2024-01-05 PROCEDURE — 84132 ASSAY OF SERUM POTASSIUM: CPT | Performed by: NURSE PRACTITIONER

## 2024-01-05 PROCEDURE — 258N000003 HC RX IP 258 OP 636: Performed by: SPECIALIST

## 2024-01-05 PROCEDURE — 36415 COLL VENOUS BLD VENIPUNCTURE: CPT | Performed by: NURSE PRACTITIONER

## 2024-01-05 PROCEDURE — 83735 ASSAY OF MAGNESIUM: CPT | Performed by: NURSE PRACTITIONER

## 2024-01-05 PROCEDURE — 250N000013 HC RX MED GY IP 250 OP 250 PS 637: Performed by: INTERNAL MEDICINE

## 2024-01-05 PROCEDURE — 99239 HOSP IP/OBS DSCHRG MGMT >30: CPT | Performed by: NURSE PRACTITIONER

## 2024-01-05 RX ORDER — LISINOPRIL 10 MG/1
10 TABLET ORAL DAILY
Qty: 30 TABLET | Refills: 0 | Status: SHIPPED | OUTPATIENT
Start: 2024-01-06 | End: 2024-01-16

## 2024-01-05 RX ORDER — OXYCODONE HYDROCHLORIDE 5 MG/1
5-10 TABLET ORAL EVERY 6 HOURS PRN
Qty: 10 TABLET | Refills: 0 | Status: SHIPPED | OUTPATIENT
Start: 2024-01-05 | End: 2024-01-18

## 2024-01-05 RX ORDER — POTASSIUM CHLORIDE 1500 MG/1
40 TABLET, EXTENDED RELEASE ORAL ONCE
Status: COMPLETED | OUTPATIENT
Start: 2024-01-05 | End: 2024-01-05

## 2024-01-05 RX ORDER — LISINOPRIL 10 MG/1
10 TABLET ORAL DAILY
Status: DISCONTINUED | OUTPATIENT
Start: 2024-01-05 | End: 2024-01-05 | Stop reason: HOSPADM

## 2024-01-05 RX ADMIN — LISINOPRIL 10 MG: 10 TABLET ORAL at 08:44

## 2024-01-05 RX ADMIN — ACETAMINOPHEN 975 MG: 325 TABLET, FILM COATED ORAL at 06:45

## 2024-01-05 RX ADMIN — SODIUM CHLORIDE: 9 INJECTION, SOLUTION INTRAVENOUS at 03:45

## 2024-01-05 RX ADMIN — FAMOTIDINE 20 MG: 20 TABLET ORAL at 08:44

## 2024-01-05 RX ADMIN — POTASSIUM CHLORIDE 40 MEQ: 1500 TABLET, EXTENDED RELEASE ORAL at 10:45

## 2024-01-05 RX ADMIN — OXYCODONE HYDROCHLORIDE 5 MG: 5 TABLET ORAL at 10:45

## 2024-01-05 ASSESSMENT — ACTIVITIES OF DAILY LIVING (ADL)
ADLS_ACUITY_SCORE: 20

## 2024-01-05 NOTE — PLAN OF CARE
Goal Outcome Evaluation:      Plan of Care Reviewed With: patient    Overall Patient Progress: improving    Outcome Evaluation: Pt A&O x4. VSS except for hypertension. Required 1 dose hydralazine around 2035 for /97. F/U 160s/70s-80s. Pt received 5 mg oxycodone at 2027. Stated pain tolerable after that.

## 2024-01-05 NOTE — PLAN OF CARE
Goal Outcome Evaluation:      Plan of Care Reviewed With: patient, child    Overall Patient Progress: improvingOverall Patient Progress: improving    Outcome Evaluation: Ate 3/4 of a slice of toast and a few bites of soup and pudding for supper. Denied nausea. Tylenol helped to decrease the pain in her abdominal incision. Incision intact with some bruising. NG was DCed. Refused offer of Chloraseptic lozenge for sore throat. IV fluids continue. Voiding well and passing gas. Potassium was 3.6 and magnesium was 1.9,  and both will be rechecked in the morning. Planning to go home tomorrow.

## 2024-01-05 NOTE — PROGRESS NOTES
S-(situation): Patient discharged to home via wheelchair to door with .     B-(background): SBO    A-(assessment): Incision intact with bruising and no drainage. Pain is well-controlled with Oxycodone. Up ad anton. Tolerating a few bites of regular diet, but has poor appetite. Passing gas and had BM. VSS    R-(recommendations): Discharge instructions reviewed with patient. Listed belongings gathered and returned to patient. Will follow up on 1-16-24 with Josiane Galvez and on 1-18-24 with Dr. Esquivel.         Discharge Nursing Criteria:     Care Plan and Patient education resolved: Yes    New Medications- pt has been educated about purpose and side effects: Yes    Vaccines  Influenza status verified at discharge:  Yes    MISC  Prescriptions if needed, hard copies sent with patient  Yes  Home medications returned to patient: NA  Medication Bin checked and emptied on discharge Yes  Patient reports post-discharge pain management plan is effective: Yes

## 2024-01-05 NOTE — DISCHARGE SUMMARY
"[facility]  Hospitalist Discharge Summary      Date of Admission:  12/31/2023  Date of Discharge:  1/5/2024  Discharging Provider: Tena Lind CNP  Discharge Service: Hospitalist Service    Discharge Diagnoses   SBO with subsequent exploratory laparotomy and open lysis of adhesions - reduction of internal hernia by General Surgery    Clinically Significant Risk Factors     # Obesity: Estimated body mass index is 31.18 kg/m  as calculated from the following:    Height as of this encounter: 1.6 m (5' 3\").    Weight as of this encounter: 79.8 kg (176 lb).       Follow-ups Needed After Discharge    Follow up with,  Philippe Esquivel MD, within 1 to 2 weeks. to evaluate   after surgery.  No follow up labs or test are needed. - [phone] for   appt.  Follow up and establish with primary care- appointment made.     Discharge Disposition   Discharged to home  Condition at discharge: Good    Hospital Course    63 year old female with PMH of hypertension, hyperlipidemia (not on any medications) admitted on 12/31/2023 after presenting to the ED on 12/31/2023 with a 4-day history of abdominal pain and cramping.  She had only taken sips of water since 12/28, but then the am of admission started vomiting.  Last bowel movement Friday 12/29 and it was normal.     Workup in the ED included CT of abdomen and pelvis revealing acute mechanical small bowel obstruction with transition point in the right lower quadrant pelvis.  Also noted to have indeterminate left adrenal nodule and indeterminate left anterior breast mass which she should have follow up on outpatient.  Lab work indicated mild hyponatremia with sodium of 133, elevated anion gap at 19, BUN elevated at 26.2, elevated WBC at 19.5, with an elevated hemoglobin of 16.1.  Lactic acid normal at 1.1     She was admitted as an inpatient due to an acute small bowel obstruction.     SBO (small bowel obstruction)  Surgery consulted by the ED provider. " " NG tube placed,and made NPO.  She underwent gastrografin which revealed \"Free flow of contrast through the small bowel. Contrast freely enters the colon. The small bowel is distended measuring up to 4.8 cm in greatest radial dimension consistent with a partial mechanical small bowel obstruction.\" She ultimately required the NG tube to be placed to suction due to increased abdominal discomfort on 1/1/24.  No bowel movement after gastrografin.        She continued to have nausea and abdominal discomfort thus she was brought to the OR on 1/3/24 for exploratory laparotomy with open lysis of adhesions and reduction of internal hernia under general anesthesia.  She tolerated he procedure well.  She progressively felt better and tolerated the NG clamped and  an advancing diet without nausea and has had two bowel movements.  Thus she was discharged by General Surgery today.     Hypertension goal BP (blood pressure) < 140/80   Has a history of hypertension, but was not taking any medications.  Has not followed up with a primary care provider for over 8 years.  I discussed with her the risks of long standing hypertension and initiated lisinopril this admission which was eventually titrated up to 10 mg daily.  She will establish care with a primary care provider at [facility] (an appointment has been made for her).  Lisinopril prescription has been added to Dr. Esquivel's discharge order.       Tobacco use disorder  Patient reports she smokes a half a pack or less of cigarettes a day.  She has not smoked since 12/28.  Has not noted any symptoms of acute nicotine withdrawal       Leukocytosis -resolve  Patient has been afebrile without any other signs or symptoms of acute infection.  No fevers.  WBC normal.     Hyponatremia resolved     Left adrenal nodule  Left anterior breast mass  Assessment: seen on CT scan.  She will need this followed outpatient.     H/O: hysterectomy    Consultations This Hospital Stay   SURGERY " GENERAL IP CONSULT    Code Status   Full Code    Time Spent on this Encounter   I, Tena Lind CNP, personally saw the patient today and spent greater than 30 minutes discharging this patient.       Tena Lind CNP  [facility] MEDICAL SURGICAL  [address] [place] [address]  Phone: [phone]  ______________________________________________________________________    Physical Exam   Vital Signs: Temp: 98.4  F (36.9  C) Temp src: Oral BP: (!) 164/81 Pulse: 63   Resp: 18 SpO2: 95 % O2 Device: None (Room air)    Weight: 176 lbs 0 oz    Gen:  Lying in bed no acute distress  HEENT:  normocephalic, atraumatic, oropharynx clear  Resp:  CTA posteriorly, normal respiratory effort  Card:  S1,S2, RRR no murmur, rub or gallop  Abd:  Soft, nondistended, non tender,  normoactive bowel sounds, abdominal midline incision c/d/i  Neuro:  Neuro exam is grossly non focal         Primary Care Physician   [facility]    Discharge Orders      Reason for your hospital stay    Patient had an exploratory laparotomy for small bowel obstruction.     Follow-up and recommended labs and tests     Follow up with me,  Philippe Esquivel MD, within 1 to 2 weeks. to evaluate after surgery.  No follow up labs or test are needed. - [phone] for appt.     Activity    Your activity upon discharge: no heavy lifting for 2 weeks, ice for comfort and may shower but no baths or soaking for 2 weeks.     Diet    Follow this diet upon discharge: Orders Placed This Encounter      Advance Diet as Tolerated: Regular Diet Adult       Significant Results and Procedures   Results for orders placed or performed during the hospital encounter of 12/31/23   CT Abdomen Pelvis w Contrast    Narrative    EXAM: CT ABDOMEN PELVIS W CONTRAST  LOCATION: [facility]  DATE: 12/31/2023    INDICATION: Abdominal pain, nausea, vomiting.  History of hysterectomy.  No bowel movement x 2 days.  Not  passing gas.  Concern for obstruction  COMPARISON: Screening mammogram from 11/21/2013.  TECHNIQUE: CT scan of the abdomen and pelvis was performed following injection of IV contrast. Multiplanar reformats were obtained. Dose reduction techniques were used.  CONTRAST: 55 mL of Isovue-370    FINDINGS:   LOWER CHEST: Mild elevation the right hemidiaphragm. Coronary atherosclerosis. There is a breast mass in the left inferior breast at approximately the 6:00 position measuring 1.5 x 1.1 cm on series 3 image 10.    HEPATOBILIARY: Hepatic steatosis. Small volume biliary sludge. No biliary ductal dilation.    PANCREAS: Normal.    SPLEEN: Normal.    ADRENAL GLANDS: Indeterminant 1.1 cm left adrenal nodule    KIDNEYS/BLADDER: No significant mass, stone, or hydronephrosis.    BOWEL: There are numerous fluid-filled dilated loops of small bowel with a transition point in the right lower quadrant pelvis on series 4 image 60 and 3 image 177. There is mild mesenteric engorgement. Small volume reactive ascites. No free air or   pneumatosis. Diverticulosis. No evidence of appendicitis.    LYMPH NODES: Normal.    VASCULATURE: No abdominal aortic aneurysm.    PELVIC ORGANS: Hysterectomy.    MUSCULOSKELETAL: Degenerative changes of the spine.      Impression    IMPRESSION:   1.  Acute mechanical small bowel obstruction with transition point in the right lower quadrant pelvis.  2.  Indeterminate left adrenal nodule. Follow-up with nonemergent outpatient adrenal mass CT.  3.  Indeterminant left anterior breast mass at approximately the 6:00 position. Recommend follow-up with nonemergent outpatient breast imaging.  4.  Hepatic steatosis.   X-ray Chest 1 vw port    Narrative    EXAM: XR CHEST PORT 1 VIEW  LOCATION: [facility]  DATE: 12/31/2023    INDICATION: NG placement  COMPARISON: CT abdomen pelvis 12/31/2023.      Impression    IMPRESSION: Nasogastric tube tip in the stomach. Mild atelectasis at the  left base. No pneumothorax or definite pleural effusion. Normal heart size.    X-ray Abdomen flat port    Narrative    EXAM: XR ABDOMEN PORT 1 VIEW  LOCATION: [facility]  DATE: 1/1/2024    INDICATION: SBO  COMPARISON: CT 12/31/2023.      Impression    IMPRESSION: Supine abdomen. NG tube in the stomach. There are again multiple dilated small bowel loops consistent with small bowel obstruction.   XR Gastrografin Challenge    Narrative    EXAM: XR GASTROGRAFIN CHALLENGE  LOCATION: [facility]  DATE: 1/1/2024    INDICATION: Small bowel obstruction.  COMPARISON: None.  TECHNIQUE: Routine water soluble contrast follow-through challenge.    FINDINGS:  FLUOROSCOPIC TIME: 0  NUMBER OF IMAGES: 4    Free flow of contrast through the small bowel. Contrast freely enters the colon. The small bowel is distended measuring up to 4.8 cm in greatest radial dimension consistent with a partial mechanical small bowel obstruction.      Impression    IMPRESSION: Contrast freely enters the colon.       Discharge Medications   Current Discharge Medication List        START taking these medications    Details   lisinopril (ZESTRIL) 10 MG tablet Take 1 tablet (10 mg) by mouth daily  Qty: 30 tablet, Refills: 0    Associated Diagnoses: Hypertension goal BP (blood pressure) < 140/80      oxyCODONE (ROXICODONE) 5 MG tablet Take 1-2 tablets (5-10 mg) by mouth every 6 hours as needed for moderate pain  Qty: 10 tablet, Refills: 0    Associated Diagnoses: Postoperative pain           Allergies   No Known Allergies

## 2024-01-05 NOTE — PROGRESS NOTES
Surgery postop day #3    Subjective:  Patient is feeling much better this morning.  (+) flatus/BM.  Xavier reg diet.  NGT out      Objective:    Vitals:    01/04/24 2035 01/04/24 2100 01/04/24 2231 01/05/24 0715   BP: (!) 179/87 (!) 162/75 (!) 162/83 (!) 164/81   BP Location:   Left arm Left arm   Patient Position:       Cuff Size:   Adult Regular    Pulse:  77 69 63   Resp:    18   Temp:   98.7  F (37.1  C) 98.4  F (36.9  C)   TempSrc:   Oral Oral   SpO2:    95%   Weight:       Height:                 Abd: soft, less distended    Results for orders placed or performed during the hospital encounter of 12/31/23 (from the past 24 hour(s))   Potassium   Result Value Ref Range    Potassium 3.6 3.4 - 5.3 mmol/L   Potassium   Result Value Ref Range    Potassium 3.2 (L) 3.4 - 5.3 mmol/L   Magnesium   Result Value Ref Range    Magnesium 1.8 1.7 - 2.3 mg/dL   Extra Tube    Narrative    The following orders were created for panel order Extra Tube.  Procedure                               Abnormality         Status                     ---------                               -----------         ------                     Extra Purple Top Tube[[id number]]                            Final result                 Please view results for these tests on the individual orders.   Extra Purple Top Tube   Result Value Ref Range    Hold Specimen JIC      Assessment/plan:  Patient is status post exploratory laparotomy and lysis of adhesions.  Much improved this morning.  Ileus resolved.  Xavier reg diet.  Will discharge home.  F/U 1-2 weeks      Philippe Esquivel MD, FACS

## 2024-01-05 NOTE — PROVIDER NOTIFICATION
Pt only had orders for IV dilaudid for pain management. Nurse requested oral pain medication so pt can wean off IV meds and work toward discharge. Provider ordered oral pain medication and adjusted IV dilaudid order.

## 2024-01-16 ENCOUNTER — OFFICE VISIT (OUTPATIENT)
Dept: FAMILY MEDICINE | Facility: CLINIC | Age: 64
End: 2024-01-16
Payer: MEDICARE

## 2024-01-16 VITALS
HEART RATE: 125 BPM | BODY MASS INDEX: 29.41 KG/M2 | RESPIRATION RATE: 12 BRPM | OXYGEN SATURATION: 96 % | WEIGHT: 166 LBS | DIASTOLIC BLOOD PRESSURE: 94 MMHG | SYSTOLIC BLOOD PRESSURE: 168 MMHG | TEMPERATURE: 98.4 F | HEIGHT: 63 IN

## 2024-01-16 DIAGNOSIS — Z11.59 NEED FOR HEPATITIS C SCREENING TEST: ICD-10-CM

## 2024-01-16 DIAGNOSIS — N63.20 MASS OF LEFT BREAST, UNSPECIFIED QUADRANT: ICD-10-CM

## 2024-01-16 DIAGNOSIS — R92.8 OTHER ABNORMAL AND INCONCLUSIVE FINDINGS ON DIAGNOSTIC IMAGING OF BREAST: ICD-10-CM

## 2024-01-16 DIAGNOSIS — I10 HYPERTENSION GOAL BP (BLOOD PRESSURE) < 140/80: ICD-10-CM

## 2024-01-16 DIAGNOSIS — Z09 HOSPITAL DISCHARGE FOLLOW-UP: Primary | ICD-10-CM

## 2024-01-16 DIAGNOSIS — Z11.4 SCREENING FOR HIV (HUMAN IMMUNODEFICIENCY VIRUS): ICD-10-CM

## 2024-01-16 DIAGNOSIS — E78.5 HYPERLIPIDEMIA LDL GOAL <130: ICD-10-CM

## 2024-01-16 DIAGNOSIS — F17.200 NICOTINE DEPENDENCE, UNCOMPLICATED, UNSPECIFIED NICOTINE PRODUCT TYPE: ICD-10-CM

## 2024-01-16 DIAGNOSIS — K56.609 SBO (SMALL BOWEL OBSTRUCTION) (H): ICD-10-CM

## 2024-01-16 DIAGNOSIS — E27.9 ADRENAL NODULE (H): ICD-10-CM

## 2024-01-16 DIAGNOSIS — Z28.21 IMMUNIZATION DECLINED: ICD-10-CM

## 2024-01-16 PROCEDURE — 99204 OFFICE O/P NEW MOD 45 MIN: CPT | Mod: 24 | Performed by: NURSE PRACTITIONER

## 2024-01-16 RX ORDER — RESPIRATORY SYNCYTIAL VIRUS VACCINE 120MCG/0.5
0.5 KIT INTRAMUSCULAR ONCE
Qty: 1 EACH | Refills: 0 | Status: CANCELLED | OUTPATIENT
Start: 2024-01-16 | End: 2024-01-16

## 2024-01-16 RX ORDER — LISINOPRIL 20 MG/1
20 TABLET ORAL DAILY
Qty: 90 TABLET | Refills: 0 | Status: SHIPPED | OUTPATIENT
Start: 2024-01-16 | End: 2024-02-27

## 2024-01-16 ASSESSMENT — PAIN SCALES - GENERAL: PAINLEVEL: MILD PAIN (2)

## 2024-01-16 NOTE — PATIENT INSTRUCTIONS
Quitting Tobacco: Care Instructions  Quitting tobacco is much harder than simply changing a habit. Nicotine cravings make it hard to quit, but you can do it. Your doctor will help you set up the plan that best meets your needs.    You will miss the nicotine at first. You may feel short-tempered and grumpy. You may have trouble sleeping or thinking clearly. The urge to use tobacco may continue for a time.   Combining tools can raise your chances of success. You can use medicine along with counseling. And you can join a quit-tobacco program, such as the American Lung Association's Freedom from Smoking program.     Get support.  Reach out to family and friends, and find a counselor to help you quit. Join a support group, such as Nicotine Anonymous. Go to www.smokefree.gov to sign up for text messaging support.     Talk to your doctor or pharmacist about medicines that can help you quit.  Medicines can help with cravings and withdrawal symptoms. There are several over-the-counter choices, such as nicotine patches, gum, and lozenges.     After you quit, do not use tobacco again, not even once.  Get rid of all tobacco products and anything that reminds you of tobacco, such as ashtrays.     Avoid things that make you reach for tobacco.  Change your daily routine. Take a different route to work, or eat a meal in a different place.     Try to cut down on stress.  Find ways to calm yourself, such as taking a hot bath or doing deep breathing exercises.     Eat a healthy diet, and get regular exercise.  Having healthy habits may help you quit using tobacco.     Don't give up on quitting if you use tobacco again.  Most people quit and restart a few times before they quit for good.   Follow-up care is a key part of your treatment and safety. Be sure to make and go to all appointments, and call your doctor if you are having problems. It's also a good idea to know your test results and keep a list of the medicines you take.  Where  "can you learn more?  Go to https://www.[employer].net/patiented  Enter Y522 in the search box to learn more about \"Quitting Tobacco: Care Instructions.\"  Current as of: March 21, 2023               Content Version: 13.8    0199-3827 [employer].   Care instructions adapted under license by your healthcare professional. If you have questions about a medical condition or this instruction, always ask your healthcare professional. [employer] disclaims any warranty or liability for your use of this information.      "

## 2024-01-16 NOTE — PROGRESS NOTES
Assessment & Plan     Hospital discharge follow-up    SBO (small bowel obstruction) (H)  Overall improving, having regular bowel movements and tolerating regular diet. Incision is healing well without evidence of infection. Encouraged to continue advancing diet. Monitor incision for signs or symptoms of infection    Mass of left breast, unspecified quadrant  Previous mammogram over 10 years ago. Discussed obtaining new mammogram for further evaluation of breast mass which she is in agreement with.   - MA Diagnostic Digital Bilateral; Future    Other abnormal and inconclusive findings on diagnostic imaging of breast  - MA Diagnostic Digital Bilateral; Future    Adrenal nodule (H24)  Per radiology recommendation, CT adrenal glands ordered, patient will contact imaging to schedule.   - CT Abdomen Pelvis w/o & w Contrast; Future    Hypertension goal BP (blood pressure) < 140/80  Blood pressure remains above goal. Increase lisinopril to 20mg daily. Encouraged low sodium diet and regular exercise. Encouraged to obtain home BP monitor and check blood pressures daily at home. Nurse BP recheck and lab in 2 weeks.   - lisinopril (ZESTRIL) 20 MG tablet; Take 1 tablet (20 mg) by mouth daily  - Basic metabolic panel  (Ca, Cl, CO2, Creat, Gluc, K, Na, BUN); Future    Hyperlipidemia LDL goal <130  Lipid panel pending.   - Lipid panel reflex to direct LDL Non-fasting; Future    Immunization declined  Reviewed recommendation for pneumococcal, COVID, Varicella and RSV vaccinations. Patient declined today    Screening for HIV (human immunodeficiency virus)  - HIV Antigen Antibody Combo; Future    Need for hepatitis C screening test  - Hepatitis C Screen Reflex to HCV RNA Quant and Genotype; Future    Nicotine dependence, uncomplicated, unspecified nicotine product type  Congratulated on smoking cessation since hospitalization. Discussed resources available, patient declined at this time.            MED REC REQUIRED  Post Medication  "Reconciliation Status: discharge medications reconciled and changed, per note/orders  Nicotine/Tobacco Cessation:  She reports that she has been smoking cigarettes. She has been smoking an average of 0.5 packs per day. She quit smokeless tobacco use about 11 years ago.  Nicotine/Tobacco Cessation Plan:   Information offered: Patient not interested at this time      BMI:   Estimated body mass index is 29.41 kg/m  as calculated from the following:    Height as of this encounter: 1.6 m (5' 3\").    Weight as of this encounter: 75.3 kg (166 lb).           Josiane Galvez NP  [facility]    Junior Stearns is a 63 year old, presenting for the following health issues:  Hospital F/U        1/16/2024     2:50 PM   Additional Questions   Roomed by Sheldon BHARDWAJ       Jinny presented to the ED on 12/31/23 with concerns of abdominal pain and cramping. CT of the abdomen and pelvis revealed mechanical small bowel obstruction. She was also noted to have an indeterminate left adrenal nodule and indeterminate left anterior breast mass. She had an elevated WBC at 19.5, sodium 133 and hemoglobin at 16.1. She was admitted to [facility] for concerns of small bowel obstruction. Gastrografin was administered, no bowel movement. She underwent surgery on 1/3/24 for exploratory laparotomy with open lysis of adhesions and reduction of internal hernia. She progressively improved following this. She was found to be hypertensive, started on lisinopril and increased to 10mg daily. She was able to discharge home on 1/5/24 with primary care follow-up.    Since being home, Jinny reports she has been doing well. She denies any ongoing pain, maybe 2/10 per her report. She has been advancing her diet, she reports her appetite returned about two days ago. Her stools have been loose until a day or two ago when she started eating more. She has not been smoking since 12/28/24, she also stopped drinking " "alcohol at that time. She feels she has been doing very well with this and reports she is surprised at how well she is doing in this regard. She has not been checking her blood pressures at home, she has been taking her lisinopril daily. She denies any side effects of the medication. She is scheduled to see Dr. Tubbs later this week for surgical follow-up.     Hospital Follow-up Visit:    Hospital/Nursing Home/IP Rehab Facility: [facility]  Date of Admission: 12/31/23/  Date of Discharge: 1/5/24  Reason(s) for Admission: SBO (small bowel obstruction)     Was your hospitalization related to COVID-19? No   Problems taking medications regularly:  None  Medication changes since discharge: None  Problems adhering to non-medication therapy:  None    Summary of hospitalization:  [facility] hospital discharge summary reviewed  Diagnostic Tests/Treatments reviewed.  Follow up needed: none  Other Healthcare Providers Involved in Patient s Care:         None  Update since discharge: improved.     Plan of care communicated with patient           Patient Active Problem List   Diagnosis    Hypertension goal BP (blood pressure) < 140/80    Hyperlipidemia LDL goal <130    Knee joint effusion    Advanced directives, counseling/discussion    H/O: hysterectomy    Generalized anxiety disorder    SBO (small bowel obstruction) (H)    Tobacco use disorder    Leukocytosis    Hyponatremia    Adrenal nodule (H24)    Mass of left breast, unspecified quadrant         Review of Systems   Constitutional, HEENT, cardiovascular, pulmonary, gi and gu systems are negative, except as otherwise noted.      Objective    BP (!) 160/100 (BP Location: Right arm, Patient Position: Chair, Cuff Size: Adult Large)   Pulse (!) 125   Temp 98.4  F (36.9  C) (Temporal)   Resp 12   Ht 1.6 m (5' 3\")   Wt 75.3 kg (166 lb)   SpO2 96%   BMI 29.41 kg/m    Body mass index is 29.41 kg/m .  Physical Exam  Vitals reviewed. "   Constitutional:       General: She is not in acute distress.     Appearance: Normal appearance.   HENT:      Right Ear: Tympanic membrane and external ear normal.      Left Ear: Tympanic membrane and external ear normal.      Nose: Nose normal.      Mouth/Throat:      Mouth: Mucous membranes are moist.      Pharynx: Oropharynx is clear.   Eyes:      Extraocular Movements: Extraocular movements intact.      Conjunctiva/sclera: Conjunctivae normal.      Pupils: Pupils are equal, round, and reactive to light.   Cardiovascular:      Rate and Rhythm: Normal rate and regular rhythm.      Heart sounds: Normal heart sounds.   Pulmonary:      Effort: Pulmonary effort is normal.      Breath sounds: Normal breath sounds.   Abdominal:      General: Abdomen is flat. Bowel sounds are normal.      Palpations: Abdomen is soft.      Comments: Midline abdominal incision well approximated without erythema, discharge or drainage.    Musculoskeletal:      Cervical back: Neck supple.   Lymphadenopathy:      Cervical: No cervical adenopathy.   Skin:     General: Skin is warm and dry.      Capillary Refill: Capillary refill takes less than 2 seconds.   Neurological:      Mental Status: She is alert and oriented to person, place, and time. Mental status is at baseline.   Psychiatric:         Mood and Affect: Mood normal.         Behavior: Behavior normal.            Admission on 12/31/2023, Discharged on 01/05/2024   Component Date Value Ref Range Status    Sodium 12/31/2023 133 (L)  135 - 145 mmol/L Final    Reference intervals for this test were updated on 09/26/2023 to more accurately reflect our healthy population. There may be differences in the flagging of prior results with similar values performed with this method. Interpretation of those prior results can be made in the context of the updated reference intervals.     Potassium 12/31/2023 4.3  3.4 - 5.3 mmol/L Final    Carbon Dioxide (CO2) 12/31/2023 18 (L)  22 - 29 mmol/L Final     Anion Gap 12/31/2023 19 (H)  7 - 15 mmol/L Final    Urea Nitrogen 12/31/2023 26.2 (H)  8.0 - 23.0 mg/dL Final    Creatinine 12/31/2023 0.92  0.51 - 0.95 mg/dL Final    GFR Estimate 12/31/2023 69  >60 mL/min/1.73m2 Final    Calcium 12/31/2023 9.7  8.8 - 10.2 mg/dL Final    Chloride 12/31/2023 96 (L)  98 - 107 mmol/L Final    Glucose 12/31/2023 162 (H)  70 - 99 mg/dL Final    Alkaline Phosphatase 12/31/2023 83  40 - 150 U/L Final    Reference intervals for this test were updated on 11/14/2023 to more accurately reflect our healthy population. There may be differences in the flagging of prior results with similar values performed with this method. Interpretation of those prior results can be made in the context of the updated reference intervals.    AST 12/31/2023 17  0 - 45 U/L Final    Reference intervals for this test were updated on 6/12/2023 to more accurately reflect our healthy population. There may be differences in the flagging of prior results with similar values performed with this method. Interpretation of those prior results can be made in the context of the updated reference intervals.    ALT 12/31/2023 23  0 - 50 U/L Final    Reference intervals for this test were updated on 6/12/2023 to more accurately reflect our healthy population. There may be differences in the flagging of prior results with similar values performed with this method. Interpretation of those prior results can be made in the context of the updated reference intervals.      Protein Total 12/31/2023 8.3  6.4 - 8.3 g/dL Final    Albumin 12/31/2023 4.5  3.5 - 5.2 g/dL Final    Bilirubin Total 12/31/2023 0.9  <=1.2 mg/dL Final    Lipase 12/31/2023 18  13 - 60 U/L Final    WBC Count 12/31/2023 19.5 (H)  4.0 - 11.0 10e3/uL Final    RBC Count 12/31/2023 4.90  3.80 - 5.20 10e6/uL Final    Hemoglobin 12/31/2023 16.1 (H)  11.7 - 15.7 g/dL Final    Hematocrit 12/31/2023 47.3 (H)  35.0 - 47.0 % Final    MCV 12/31/2023 97  78 - 100 fL Final     MCH 12/31/2023 32.9  26.5 - 33.0 pg Final    MCHC 12/31/2023 34.0  31.5 - 36.5 g/dL Final    RDW 12/31/2023 12.3  10.0 - 15.0 % Final    Platelet Count 12/31/2023 255  150 - 450 10e3/uL Final    % Neutrophils 12/31/2023 84  % Final    % Lymphocytes 12/31/2023 10  % Final    % Monocytes 12/31/2023 5  % Final    % Eosinophils 12/31/2023 0  % Final    % Basophils 12/31/2023 0  % Final    % Immature Granulocytes 12/31/2023 1  % Final    NRBCs per 100 WBC 12/31/2023 0  <1 /100 Final    Absolute Neutrophils 12/31/2023 16.3 (H)  1.6 - 8.3 10e3/uL Final    Absolute Lymphocytes 12/31/2023 2.0  0.8 - 5.3 10e3/uL Final    Absolute Monocytes 12/31/2023 1.0  0.0 - 1.3 10e3/uL Final    Absolute Eosinophils 12/31/2023 0.0  0.0 - 0.7 10e3/uL Final    Absolute Basophils 12/31/2023 0.0  0.0 - 0.2 10e3/uL Final    Absolute Immature Granulocytes 12/31/2023 0.2  <=0.4 10e3/uL Final    Absolute NRBCs 12/31/2023 0.0  10e3/uL Final    Lactic Acid 12/31/2023 1.1  0.7 - 2.0 mmol/L Final    Magnesium 12/31/2023 2.2  1.7 - 2.3 mg/dL Final    Sodium 01/01/2024 142  135 - 145 mmol/L Final    Reference intervals for this test were updated on 09/26/2023 to more accurately reflect our healthy population. There may be differences in the flagging of prior results with similar values performed with this method. Interpretation of those prior results can be made in the context of the updated reference intervals.     Potassium 01/01/2024 3.6  3.4 - 5.3 mmol/L Final    Chloride 01/01/2024 104  98 - 107 mmol/L Final    Carbon Dioxide (CO2) 01/01/2024 23  22 - 29 mmol/L Final    Anion Gap 01/01/2024 15  7 - 15 mmol/L Final    Urea Nitrogen 01/01/2024 24.3 (H)  8.0 - 23.0 mg/dL Final    Creatinine 01/01/2024 0.71  0.51 - 0.95 mg/dL Final    GFR Estimate 01/01/2024 >90  >60 mL/min/1.73m2 Final    Calcium 01/01/2024 8.6 (L)  8.8 - 10.2 mg/dL Final    Glucose 01/01/2024 81  70 - 99 mg/dL Final    WBC Count 01/01/2024 10.5  4.0 - 11.0 10e3/uL Final    RBC Count  01/01/2024 3.98  3.80 - 5.20 10e6/uL Final    Hemoglobin 01/01/2024 12.7  11.7 - 15.7 g/dL Final    Hematocrit 01/01/2024 39.7  35.0 - 47.0 % Final    MCV 01/01/2024 100  78 - 100 fL Final    MCH 01/01/2024 31.9  26.5 - 33.0 pg Final    MCHC 01/01/2024 32.0  31.5 - 36.5 g/dL Final    RDW 01/01/2024 12.4  10.0 - 15.0 % Final    Platelet Count 01/01/2024 187  150 - 450 10e3/uL Final    Magnesium 01/01/2024 2.2  1.7 - 2.3 mg/dL Final    Magnesium 01/02/2024 2.2  1.7 - 2.3 mg/dL Final    Sodium 01/02/2024 146 (H)  135 - 145 mmol/L Final    Reference intervals for this test were updated on 09/26/2023 to more accurately reflect our healthy population. There may be differences in the flagging of prior results with similar values performed with this method. Interpretation of those prior results can be made in the context of the updated reference intervals.     Potassium 01/02/2024 3.5  3.4 - 5.3 mmol/L Final    Chloride 01/02/2024 107  98 - 107 mmol/L Final    Carbon Dioxide (CO2) 01/02/2024 21 (L)  22 - 29 mmol/L Final    Anion Gap 01/02/2024 18 (H)  7 - 15 mmol/L Final    Urea Nitrogen 01/02/2024 25.7 (H)  8.0 - 23.0 mg/dL Final    Creatinine 01/02/2024 0.60  0.51 - 0.95 mg/dL Final    GFR Estimate 01/02/2024 >90  >60 mL/min/1.73m2 Final    Calcium 01/02/2024 8.9  8.8 - 10.2 mg/dL Final    Glucose 01/02/2024 83  70 - 99 mg/dL Final    Hold Specimen 01/02/2024 [facility]   Final    WBC Count 01/02/2024 10.6  4.0 - 11.0 10e3/uL Final    RBC Count 01/02/2024 3.91  3.80 - 5.20 10e6/uL Final    Hemoglobin 01/02/2024 12.8  11.7 - 15.7 g/dL Final    Hematocrit 01/02/2024 39.8  35.0 - 47.0 % Final    MCV 01/02/2024 102 (H)  78 - 100 fL Final    MCH 01/02/2024 32.7  26.5 - 33.0 pg Final    MCHC 01/02/2024 32.2  31.5 - 36.5 g/dL Final    RDW 01/02/2024 12.5  10.0 - 15.0 % Final    Platelet Count 01/02/2024 192  150 - 450 10e3/uL Final    INR 01/02/2024 1.11  0.85 - 1.15 Final    Magnesium 01/03/2024 2.0  1.7 - 2.3 mg/dL Final    Sodium  01/03/2024 147 (H)  135 - 145 mmol/L Final    Reference intervals for this test were updated on 09/26/2023 to more accurately reflect our healthy population. There may be differences in the flagging of prior results with similar values performed with this method. Interpretation of those prior results can be made in the context of the updated reference intervals.     Potassium 01/03/2024 3.7  3.4 - 5.3 mmol/L Final    Chloride 01/03/2024 112 (H)  98 - 107 mmol/L Final    Carbon Dioxide (CO2) 01/03/2024 20 (L)  22 - 29 mmol/L Final    Anion Gap 01/03/2024 15  7 - 15 mmol/L Final    Urea Nitrogen 01/03/2024 22.4  8.0 - 23.0 mg/dL Final    Creatinine 01/03/2024 0.57  0.51 - 0.95 mg/dL Final    GFR Estimate 01/03/2024 >90  >60 mL/min/1.73m2 Final    Calcium 01/03/2024 8.0 (L)  8.8 - 10.2 mg/dL Final    Glucose 01/03/2024 89  70 - 99 mg/dL Final    WBC Count 01/03/2024 10.7  4.0 - 11.0 10e3/uL Final    RBC Count 01/03/2024 3.59 (L)  3.80 - 5.20 10e6/uL Final    Hemoglobin 01/03/2024 11.4 (L)  11.7 - 15.7 g/dL Final    Hematocrit 01/03/2024 36.7  35.0 - 47.0 % Final    MCV 01/03/2024 102 (H)  78 - 100 fL Final    MCH 01/03/2024 31.8  26.5 - 33.0 pg Final    MCHC 01/03/2024 31.1 (L)  31.5 - 36.5 g/dL Final    RDW 01/03/2024 12.4  10.0 - 15.0 % Final    Platelet Count 01/03/2024 185  150 - 450 10e3/uL Final    % Neutrophils 01/03/2024 81  % Final    % Lymphocytes 01/03/2024 11  % Final    % Monocytes 01/03/2024 7  % Final    % Eosinophils 01/03/2024 0  % Final    % Basophils 01/03/2024 0  % Final    % Immature Granulocytes 01/03/2024 1  % Final    NRBCs per 100 WBC 01/03/2024 0  <1 /100 Final    Absolute Neutrophils 01/03/2024 8.7 (H)  1.6 - 8.3 10e3/uL Final    Absolute Lymphocytes 01/03/2024 1.1  0.8 - 5.3 10e3/uL Final    Absolute Monocytes 01/03/2024 0.8  0.0 - 1.3 10e3/uL Final    Absolute Eosinophils 01/03/2024 0.0  0.0 - 0.7 10e3/uL Final    Absolute Basophils 01/03/2024 0.0  0.0 - 0.2 10e3/uL Final    Absolute  Immature Granulocytes 01/03/2024 0.1  <=0.4 10e3/uL Final    Absolute NRBCs 01/03/2024 0.0  10e3/uL Final    Magnesium 01/04/2024 1.9  1.7 - 2.3 mg/dL Final    WBC Count 01/04/2024 9.5  4.0 - 11.0 10e3/uL Final    RBC Count 01/04/2024 3.72 (L)  3.80 - 5.20 10e6/uL Final    Hemoglobin 01/04/2024 12.1  11.7 - 15.7 g/dL Final    Hematocrit 01/04/2024 37.4  35.0 - 47.0 % Final    MCV 01/04/2024 101 (H)  78 - 100 fL Final    MCH 01/04/2024 32.5  26.5 - 33.0 pg Final    MCHC 01/04/2024 32.4  31.5 - 36.5 g/dL Final    RDW 01/04/2024 12.2  10.0 - 15.0 % Final    Platelet Count 01/04/2024 191  150 - 450 10e3/uL Final    Sodium 01/04/2024 142  135 - 145 mmol/L Final    Reference intervals for this test were updated on 09/26/2023 to more accurately reflect our healthy population. There may be differences in the flagging of prior results with similar values performed with this method. Interpretation of those prior results can be made in the context of the updated reference intervals.     Potassium 01/04/2024 3.4  3.4 - 5.3 mmol/L Final    Chloride 01/04/2024 107  98 - 107 mmol/L Final    Carbon Dioxide (CO2) 01/04/2024 18 (L)  22 - 29 mmol/L Final    Anion Gap 01/04/2024 17 (H)  7 - 15 mmol/L Final    Urea Nitrogen 01/04/2024 16.5  8.0 - 23.0 mg/dL Final    Creatinine 01/04/2024 0.56  0.51 - 0.95 mg/dL Final    GFR Estimate 01/04/2024 >90  >60 mL/min/1.73m2 Final    Calcium 01/04/2024 8.3 (L)  8.8 - 10.2 mg/dL Final    Glucose 01/04/2024 73  70 - 99 mg/dL Final    Potassium 01/04/2024 3.6  3.4 - 5.3 mmol/L Final    Potassium 01/05/2024 3.2 (L)  3.4 - 5.3 mmol/L Final    Magnesium 01/05/2024 1.8  1.7 - 2.3 mg/dL Final    Hold Specimen 01/05/2024 [facility]   Final

## 2024-01-17 ENCOUNTER — TELEPHONE (OUTPATIENT)
Dept: FAMILY MEDICINE | Facility: CLINIC | Age: 64
End: 2024-01-17
Payer: MEDICARE

## 2024-01-17 DIAGNOSIS — R92.8 OTHER ABNORMAL AND INCONCLUSIVE FINDINGS ON DIAGNOSTIC IMAGING OF BREAST: ICD-10-CM

## 2024-01-17 DIAGNOSIS — N63.20 MASS OF LEFT BREAST, UNSPECIFIED QUADRANT: Primary | ICD-10-CM

## 2024-01-17 NOTE — TELEPHONE ENCOUNTER
FYI - Status Update    Who is Calling: patient    Update: Pt received a call from the imaging department and they said the provider needs to put in another order for an ultrasound.     Does caller want a call/response back: Yes     Okay to leave a detailed message?: Yes at Home number on file [phone] (home)

## 2024-01-17 NOTE — TELEPHONE ENCOUNTER
MA called patient to clarify. Jinny stated that radiology called her this morning and told her to call ordering provider to reorder US. Upon chart view, MA found note under open orders that stated:    Ordering provider to add breast US orders/will like to schedule all 3 exams at the same time.         Bonnie Betts MA

## 2024-01-18 ENCOUNTER — OFFICE VISIT (OUTPATIENT)
Dept: SURGERY | Facility: CLINIC | Age: 64
End: 2024-01-18
Payer: MEDICARE

## 2024-01-18 VITALS
BODY MASS INDEX: 29.41 KG/M2 | DIASTOLIC BLOOD PRESSURE: 92 MMHG | SYSTOLIC BLOOD PRESSURE: 130 MMHG | TEMPERATURE: 96.3 F | WEIGHT: 166 LBS

## 2024-01-18 DIAGNOSIS — Z09 POSTOP CHECK: Primary | ICD-10-CM

## 2024-01-18 PROCEDURE — 99024 POSTOP FOLLOW-UP VISIT: CPT | Performed by: SPECIALIST

## 2024-01-18 ASSESSMENT — PAIN SCALES - GENERAL: PAINLEVEL: NO PAIN (1)

## 2024-01-18 NOTE — LETTER
1/18/2024         RE: Jinny Smith  [address]  [place] [address]        Dear Colleague,    Thank you for referring your patient, Jinny Smith, to the [facility]. Please see a copy of my visit note below.    Follow-up for diagnostic laparoscopy and exploratory laparotomy for SBO      Subjective:  Patient feels good.  No complaints.  Feeling much better.  Moving her bowels without difficulty.    Objective:  B/P: 130/92, T: 96.3, P: Data Unavailable, R: Data Unavailable  Abdomen: Soft, nontender, nondistended, incision healing well.      Assessment/plan:  Patient is status post exploratory laparotomy for small bowel obstruction.  Much improved.  She will gradually increase her activity as tolerated and follow-up with us as necessary.    Philippe Esquivel MD, FACS      Again, thank you for allowing me to participate in the care of your patient.        Sincerely,        Philippe Esquivel MD

## 2024-01-18 NOTE — TELEPHONE ENCOUNTER
[place], Josiane E, NP  [place] [id number] hours ago (4:51 PM)     SR  Ultrasound orders placed.

## 2024-01-18 NOTE — TELEPHONE ENCOUNTER
Patient notified and transferred to [facility] appt.  Jacquelin Franklin MA 1/18/2024

## 2024-01-18 NOTE — PROGRESS NOTES
Follow-up for diagnostic laparoscopy and exploratory laparotomy for SBO      Subjective:  Patient feels good.  No complaints.  Feeling much better.  Moving her bowels without difficulty.    Objective:  B/P: 130/92, T: 96.3, P: Data Unavailable, R: Data Unavailable  Abdomen: Soft, nontender, nondistended, incision healing well.      Assessment/plan:  Patient is status post exploratory laparotomy for small bowel obstruction.  Much improved.  She will gradually increase her activity as tolerated and follow-up with us as necessary.    Philippe Esquivel MD, FACS

## 2024-01-30 ENCOUNTER — ALLIED HEALTH/NURSE VISIT (OUTPATIENT)
Dept: FAMILY MEDICINE | Facility: CLINIC | Age: 64
End: 2024-01-30
Payer: MEDICARE

## 2024-01-30 ENCOUNTER — LAB (OUTPATIENT)
Dept: LAB | Facility: CLINIC | Age: 64
End: 2024-01-30
Payer: MEDICARE

## 2024-01-30 ENCOUNTER — TELEPHONE (OUTPATIENT)
Dept: FAMILY MEDICINE | Facility: CLINIC | Age: 64
End: 2024-01-30

## 2024-01-30 VITALS — DIASTOLIC BLOOD PRESSURE: 82 MMHG | SYSTOLIC BLOOD PRESSURE: 136 MMHG

## 2024-01-30 DIAGNOSIS — Z11.59 NEED FOR HEPATITIS C SCREENING TEST: ICD-10-CM

## 2024-01-30 DIAGNOSIS — E78.5 HYPERLIPIDEMIA LDL GOAL <130: ICD-10-CM

## 2024-01-30 DIAGNOSIS — I10 HYPERTENSION GOAL BP (BLOOD PRESSURE) < 140/80: ICD-10-CM

## 2024-01-30 DIAGNOSIS — I10 HYPERTENSION GOAL BP (BLOOD PRESSURE) < 140/80: Primary | ICD-10-CM

## 2024-01-30 DIAGNOSIS — Z11.4 SCREENING FOR HIV (HUMAN IMMUNODEFICIENCY VIRUS): ICD-10-CM

## 2024-01-30 LAB
ANION GAP SERPL CALCULATED.3IONS-SCNC: 14 MMOL/L (ref 7–15)
BUN SERPL-MCNC: 9.7 MG/DL (ref 8–23)
CALCIUM SERPL-MCNC: 10 MG/DL (ref 8.8–10.2)
CHLORIDE SERPL-SCNC: 104 MMOL/L (ref 98–107)
CHOLEST SERPL-MCNC: 243 MG/DL
CREAT SERPL-MCNC: 0.72 MG/DL (ref 0.51–0.95)
DEPRECATED HCO3 PLAS-SCNC: 25 MMOL/L (ref 22–29)
EGFRCR SERPLBLD CKD-EPI 2021: >90 ML/MIN/1.73M2
FASTING STATUS PATIENT QL REPORTED: YES
GLUCOSE SERPL-MCNC: 116 MG/DL (ref 70–99)
HDLC SERPL-MCNC: 47 MG/DL
HIV 1+2 AB+HIV1 P24 AG SERPL QL IA: NONREACTIVE
LDLC SERPL CALC-MCNC: 154 MG/DL
NONHDLC SERPL-MCNC: 196 MG/DL
POTASSIUM SERPL-SCNC: 4.7 MMOL/L (ref 3.4–5.3)
SODIUM SERPL-SCNC: 143 MMOL/L (ref 135–145)
TRIGL SERPL-MCNC: 211 MG/DL

## 2024-01-30 PROCEDURE — 86803 HEPATITIS C AB TEST: CPT

## 2024-01-30 PROCEDURE — 36415 COLL VENOUS BLD VENIPUNCTURE: CPT

## 2024-01-30 PROCEDURE — 87389 HIV-1 AG W/HIV-1&-2 AB AG IA: CPT

## 2024-01-30 PROCEDURE — 99207 PR NO CHARGE NURSE ONLY: CPT

## 2024-01-30 PROCEDURE — 80048 BASIC METABOLIC PNL TOTAL CA: CPT

## 2024-01-30 PROCEDURE — 80061 LIPID PANEL: CPT

## 2024-01-30 NOTE — TELEPHONE ENCOUNTER
Called Jinny today 1/30/24.  She stated that she was fasting for the appointment.  She also said that she is ok to start a cholesterol med at this time and not wait for her new PCP.      Avani HURTADO MA

## 2024-01-30 NOTE — PROGRESS NOTES
Jinny mSith is a 64 year old patient who comes in today for a Blood Pressure check.  Initial BP:  /82      Data Unavailable  Disposition: follow-up as previously indicated by provider and results routed to provider    Jacquelin Franklin MA 1/30/2024

## 2024-01-30 NOTE — TELEPHONE ENCOUNTER
----- Message from Josiane Galvez NP sent at 1/30/2024  4:39 PM CST -----  Team - please call patient with results.    Kidney function and electrolytes are normal. Glucose is elevated, please verify if she was fasting for this. Her cholesterol is also elevated. I would suggest considering medication for the elevated cholesterol, we can start this now or if she would like to discuss with her new PCP at her upcoming appointment that would be fine as well.

## 2024-01-31 ENCOUNTER — HOSPITAL ENCOUNTER (OUTPATIENT)
Dept: ULTRASOUND IMAGING | Facility: CLINIC | Age: 64
Discharge: HOME | End: 2024-01-31
Attending: NURSE PRACTITIONER
Payer: MEDICARE

## 2024-01-31 ENCOUNTER — HOSPITAL ENCOUNTER (OUTPATIENT)
Dept: CT IMAGING | Facility: CLINIC | Age: 64
Discharge: HOME | End: 2024-01-31
Attending: NURSE PRACTITIONER
Payer: MEDICARE

## 2024-01-31 ENCOUNTER — HOSPITAL ENCOUNTER (OUTPATIENT)
Dept: MAMMOGRAPHY | Facility: CLINIC | Age: 64
Discharge: HOME | End: 2024-01-31
Attending: NURSE PRACTITIONER
Payer: MEDICARE

## 2024-01-31 DIAGNOSIS — N63.20 MASS OF LEFT BREAST, UNSPECIFIED QUADRANT: ICD-10-CM

## 2024-01-31 DIAGNOSIS — E27.9 ADRENAL NODULE (H): ICD-10-CM

## 2024-01-31 DIAGNOSIS — R92.8 OTHER ABNORMAL AND INCONCLUSIVE FINDINGS ON DIAGNOSTIC IMAGING OF BREAST: ICD-10-CM

## 2024-01-31 LAB — HCV AB SERPL QL IA: NONREACTIVE

## 2024-01-31 PROCEDURE — 250N000011 HC RX IP 250 OP 636: Performed by: NURSE PRACTITIONER

## 2024-01-31 PROCEDURE — 250N000009 HC RX 250: Performed by: NURSE PRACTITIONER

## 2024-01-31 PROCEDURE — 74170 CT ABD WO CNTRST FLWD CNTRST: CPT | Mod: MG

## 2024-01-31 PROCEDURE — 76642 ULTRASOUND BREAST LIMITED: CPT | Mod: LT

## 2024-01-31 PROCEDURE — 77062 BREAST TOMOSYNTHESIS BI: CPT

## 2024-01-31 RX ORDER — IOPAMIDOL 755 MG/ML
500 INJECTION, SOLUTION INTRAVASCULAR ONCE
Status: COMPLETED | OUTPATIENT
Start: 2024-01-31 | End: 2024-01-31

## 2024-01-31 RX ADMIN — SODIUM CHLORIDE 60 ML: 9 INJECTION, SOLUTION INTRAVENOUS at 13:11

## 2024-01-31 RX ADMIN — IOPAMIDOL 80 ML: 755 INJECTION, SOLUTION INTRAVENOUS at 13:11

## 2024-02-05 RX ORDER — ATORVASTATIN CALCIUM 20 MG/1
20 TABLET, FILM COATED ORAL DAILY
Qty: 90 TABLET | Refills: 3 | Status: SHIPPED | OUTPATIENT
Start: 2024-02-05

## 2024-02-13 ENCOUNTER — HOSPITAL ENCOUNTER (OUTPATIENT)
Dept: MAMMOGRAPHY | Facility: CLINIC | Age: 64
Discharge: HOME | End: 2024-02-13
Attending: NURSE PRACTITIONER
Payer: MEDICARE

## 2024-02-13 ENCOUNTER — HOSPITAL ENCOUNTER (OUTPATIENT)
Dept: ULTRASOUND IMAGING | Facility: CLINIC | Age: 64
Discharge: HOME | End: 2024-02-13
Attending: NURSE PRACTITIONER
Payer: MEDICARE

## 2024-02-13 DIAGNOSIS — R92.8 ABNORMAL MAMMOGRAM: ICD-10-CM

## 2024-02-13 PROCEDURE — 88305 TISSUE EXAM BY PATHOLOGIST: CPT | Mod: 26 | Performed by: PATHOLOGY

## 2024-02-13 PROCEDURE — 272N000431 US BREAST BIOPSY CORE NEEDLE LEFT

## 2024-02-13 PROCEDURE — 88360 TUMOR IMMUNOHISTOCHEM/MANUAL: CPT | Mod: TC | Performed by: NURSE PRACTITIONER

## 2024-02-13 PROCEDURE — 999N000065 MA POST PROCEDURE LEFT

## 2024-02-13 PROCEDURE — 250N000009 HC RX 250: Performed by: RADIOLOGY

## 2024-02-13 PROCEDURE — 88360 TUMOR IMMUNOHISTOCHEM/MANUAL: CPT | Mod: 26 | Performed by: PATHOLOGY

## 2024-02-13 PROCEDURE — 88342 IMHCHEM/IMCYTCHM 1ST ANTB: CPT | Mod: 26 | Performed by: PATHOLOGY

## 2024-02-13 RX ORDER — LIDOCAINE HYDROCHLORIDE 10 MG/ML
10 INJECTION, SOLUTION EPIDURAL; INFILTRATION; INTRACAUDAL; PERINEURAL ONCE
Status: COMPLETED | OUTPATIENT
Start: 2024-02-13 | End: 2024-02-13

## 2024-02-13 RX ADMIN — LIDOCAINE HYDROCHLORIDE 10 ML: 10 INJECTION, SOLUTION EPIDURAL; INFILTRATION; INTRACAUDAL; PERINEURAL at 13:44

## 2024-02-16 LAB
PATH REPORT.COMMENTS IMP SPEC: ABNORMAL
PATH REPORT.COMMENTS IMP SPEC: YES
PATH REPORT.FINAL DX SPEC: ABNORMAL
PATH REPORT.GROSS SPEC: ABNORMAL
PATH REPORT.MICROSCOPIC SPEC OTHER STN: ABNORMAL
PATH REPORT.MICROSCOPIC SPEC OTHER STN: ABNORMAL
PATH REPORT.RELEVANT HX SPEC: ABNORMAL
PHOTO IMAGE: ABNORMAL

## 2024-02-19 ENCOUNTER — PATIENT OUTREACH (OUTPATIENT)
Dept: ONCOLOGY | Facility: CLINIC | Age: 64
End: 2024-02-19
Payer: MEDICARE

## 2024-02-19 DIAGNOSIS — C50.112 MALIGNANT NEOPLASM OF CENTRAL PORTION OF LEFT FEMALE BREAST, UNSPECIFIED ESTROGEN RECEPTOR STATUS (H): Primary | ICD-10-CM

## 2024-02-19 NOTE — PROGRESS NOTES
Malignant Path:  Pathology report reviewed with our breast radiologist Dr. Edmund Almonte, who confirmed the recent breast imaging is concordant with the final surgical pathology results below.    I phoned Ms. 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     Patient states no problems with biopsy site.  Recommended follow up is Surgical Consult- Breast MRI to evaluate the extent of disease, and a Medical Oncology Consultation.  I have placed orders for Surgical consult, and Medical Oncology consultations.  A Cancer Care Team  will be reaching out to patient to set up her consults at the [facility] location.  Patient has contact phone number if needed.    Questions were answered and she has my phone number if she has further questions.  Patient verbalized understanding and agrees with the plan of care.  Ordering provider- Josiane Galvez- CNP has been notified of the results, recommendations for follow up:  Breast MRI, surgical consultation and medical oncology consultation.  I will forward this note, along with the pathology results.  Frances Freeman RN, BSN  Breast Care Nurse Coordinator  [facility] [place]  [phone]      Jinny Smith T [id number]  F, 1960  Surgical Pathology Report (Final result) [id number]  Authorizing Provider: Josiane Galvez NP Ordering Provider: Josiane Galvez NP  Ordering Location: [facility]  Imaging  Collected: 02/13/2024 01:48 PM  Pathologist: Ekta Eldridge MD Received: 02/13/2024 02:05 PM  .  Specimens  A Breast, Left  .  .  Final Diagnosis  A. Left breast mass, ultrasound-guided core biopsy:  -Fragmented portions of carcinoma with focal solid papillary and mucinous features.  -Associated stroma with fibrosis, hemosiderin  and acute hemorrhage suggesting the wall of a cyst.  -Surgical excision is needed for definitive diagnosis and subclassification (see Comment).  Electronically signed by Ekta Eldridge MD on 2/16/2024 at 4:51 PM

## 2024-02-19 NOTE — PROGRESS NOTES
New Patient Oncology Nurse Navigator Note     Referring provider: Josiane Galvez NP      Referring Clinic/Organization: [facility]     Referred to (specialty:) Medical Oncology and Cancer Surgery     Requested provider (if applicable): Please arrange consult at the [facility] location     Date Referral Received: February 19, 2024     Evaluation for:  C50.112 (ICD-10-CM) - Malignant neoplasm of central portion of left female breast, unspecified estrogen receptor status (H)     Clinical History (per Nurse review of records provided):      CT abdomen pelvis was performed on 12/31/243. Indeterminant left anterior breast mass was identified at approximately the 6:00 position. Recommend follow-up with nonemergent outpatient breast imaging.    1/31/24 - bilateral diagnostic mammograms and left breast ultrasound.   FINDINGS:    Bilateral diagnostic mammogram with tomosynthesis:  Right breast: No mammographic evidence for malignancy.    Left breast: In the lower central posterior aspect of the left breast there is a round mass that needs further evaluation with ultrasound. A few smaller adjacent round nodules are present as well. There are also a few benign-appearing calcifications that are seen in the retroareolar region inferiorly that appear to be at or near the skin.     Left breast ultrasound: In the 6:00 position 3 cm from the nipple here is a 12 x 11 x 19 mm oval heterogeneous mass that correlates with the largest mass seen on previous mammogram. The surrounding  tissue is heterogeneous. Only one definitive adjacent nodule is seen.t This is approximately 4 mm away from the main mass and is bilobed and measures 9 x 4 x 5 mm. No other definable masses are seen.                                                                   IMPRESSION:  Dominant mass in the 6:00 position of the left breast with one definable adjacent mass on ultrasound. There are multiple smaller nodular areas seen  adjacent to this on the mammogram. Ultrasound-guided biopsy is recommended.     Addendum: The subtle nodular opacities seen about the dominant mass cover an area of roughly 3.5 x 2.8 cm on the CC view. Depending on pathology from the biopsy, MRI may be useful to further evaluate  disease extent.    No MRI has been ordered as of writing on 2/19.    2/13/24 -   A. Left breast mass, ultrasound-guided core biopsy:  -Fragmented portions of carcinoma with focal solid papillary and mucinous features.  -Associated stroma with fibrosis, hemosiderin and acute hemorrhage suggesting the wall of a cyst.  -Surgical excision is needed for definitive diagnosis and subclassification (see Comment).  Comment  The findings in this biopsy confirm malignancy but the exact subclassification and the distinction between in situ and invasive carcinoma cannot be determined due to the nature of the tumor cells, the presence of a possible cyst wall, and the disrupted nature of the biopsy fragments. The differential diagnosis includes solid papillary carcinoma with mucinous features and a combination of both solid papillary carcinoma and mucinous carcinoma. Complete surgical excision is needed for definitive diagnosis. The complex left breast ultrasound findings are also noted.     Patient resides in [place], [place]. [facility] location preferred.    2/19 13:53 - Telephoned and spoke with Jinny to assist in scheduling cancer surgery and medical oncology consult. Explained my role and purpose for the call. She expressed anxiety at the MRI suggested (not ordered yet) and we discussed some strategies and what to expect. She may also discuss medication interventions for MRI with her provider.    Writer received referral, reviewed for appropriate plan, and call warm transferred to New Patient Scheduling for completion.    Records Location: See Bookmarked material     Records Needed: Breast imaging for past 5 years

## 2024-02-26 ENCOUNTER — OFFICE VISIT (OUTPATIENT)
Dept: SURGERY | Facility: CLINIC | Age: 64
End: 2024-02-26
Payer: MEDICARE

## 2024-02-26 VITALS
TEMPERATURE: 97.8 F | DIASTOLIC BLOOD PRESSURE: 82 MMHG | WEIGHT: 166 LBS | SYSTOLIC BLOOD PRESSURE: 122 MMHG | HEIGHT: 63 IN | BODY MASS INDEX: 29.41 KG/M2

## 2024-02-26 DIAGNOSIS — C50.112 MALIGNANT NEOPLASM OF CENTRAL PORTION OF LEFT FEMALE BREAST, UNSPECIFIED ESTROGEN RECEPTOR STATUS (H): Primary | ICD-10-CM

## 2024-02-26 DIAGNOSIS — Z80.3 FHX: BREAST CANCER IN FIRST DEGREE RELATIVE: ICD-10-CM

## 2024-02-26 PROCEDURE — 99214 OFFICE O/P EST MOD 30 MIN: CPT | Mod: 24 | Performed by: SPECIALIST

## 2024-02-26 RX ORDER — LORAZEPAM 0.5 MG/1
.5-1 TABLET ORAL EVERY 6 HOURS PRN
Qty: 2 TABLET | Refills: 0 | Status: SHIPPED | OUTPATIENT
Start: 2024-02-26 | End: 2024-02-27

## 2024-02-26 ASSESSMENT — PAIN SCALES - GENERAL: PAINLEVEL: NO PAIN (0)

## 2024-02-26 NOTE — PROGRESS NOTES
Follow-up for left breast mass    Subjective:  Patient is well-known to me for an exploratory laparotomy secondary to bowel obstruction in January of this year.  On that CT a left breast mass was detected.  Subsequent imaging and core biopsy revealed portions of carcinoma with solid and mucinous features.  Patient states she did not notice the mass until the recent mammogram.  There is a family history of breast cancer in her mother who developed it in her 60s.  She now presents to me for evaluation of her left breast cancer.  No right breast complaints.      Objective:  B/P: 122/82, T: 97.8, P: Data Unavailable, R: Data Unavailable  Chest: Right breast: No masses or adenopathy.  Left breast: 3 x 3 x 2 cm firm mass 6 o'clock position just below nipple.  No adenopathy.  Mobile.  Some ecchymoses from biopsy.    PATHOLOGIC RESULT: Fragmented portions of carcinoma with solid  papillary and mucinous features.     RADIOLOGIC CORRELATION: Concordant.     RECOMMENDATION: Surgical consultation. As there is additional  nodularity seen about the primary mass on the mammogram measuring up  to 3.5 x 2.8 cm that was not appreciated on ultrasound, MRI would be  recommended to fully evaluate disease extent.     REJI BECKER MD         SYSTEM ID:  [id number]   Addended by Reji Becker MD on 2/19/2024  3:40 PM     Study Result    Narrative & Impression   ULTRASOUND-GUIDED LEFT BREAST CORE BIOPSY;   BIOPSY MARKER PLACEMENT;   POST PROCEDURE DIGITAL MAMMOGRAM; 2/13/2024 2:15 PM     INDICATION FOR PROCEDURE: Indeterminate mass left breast for which  biopsy recommended.     LESION DESCRIPTION: Mass  LESION LOCATION: 6:00, 3 cm from nipple  LESION DIMENSIONS: 19 x 12 x 11 mm     PROCEDURE: Informed consent was obtained. The skin was prepped and  draped in sterile fashion. 5 mL of 1% lidocaine was utilized for local  anesthesia. A 13-gauge trocar was introduced under direct ultrasound  guidance. A series of 5 samples were  obtained with a 14-gauge  core-cutting needle. A coil-shaped clip was deployed to devonte the  lesion.      Post biopsy unilateral digital mammogram of the left breast showed the  clip to be inappropriate position.  The patient tolerated the  procedure well. No immediate complication. No significant blood loss.  No pain following biopsy.                                                                      IMPRESSION: Successful left breast ultrasound-guided core biopsy and  biopsy marker placement. Pathology pending.       LITA RICHARDSON MD         SYSTEM ID:  [id number]       TATIANA YADAV  Accession #: [id number], [id number]     Addendum: The subtle nodular opacities seen about the dominant mass  cover an area of roughly 3.5 x 2.8 cm on the CC view. Depending on  pathology from the biopsy, MRI may be useful to further evaluate  disease extent.     REJI BECKER MD  (Date of addendum: 2/1/2024)     REJI BECKER MD         SYSTEM ID:  [id number]   Addended by Reji Becker MD on 2/1/2024  4:12 PM   TATIANA YADAV  Accession #: [id number], [id number]     Addendum: The subtle nodular opacities seen about the dominant mass  cover an area of roughly 3.5 x 2.8 cm on the CC view. Depending on  pathology from the biopsy, MRI may be useful to further evaluate  disease extent.     REJI BECKER MD  (Date of addendum: 2/1/2024)     REJI BECKER MD         SYSTEM ID:  [id number]   Addended by Reji Becker MD on 2/1/2024  4:12 PM     Study Result    Narrative & Impression   DIAGNOSTIC BILATERAL MAMMOGRAM WITH HANK;   ULTRASOUND LEFT BREAST LIMITED 1-3 QUADRANTS  1/31/2024 2:55 PM     HISTORY:  Previous CT shows mass in the lower left breast.     COMPARISON:  11/21/2013 and 8/16/2012     BREAST DENSITY: Scattered fibroglandular densities.     FINDINGS:    Bilateral diagnostic mammogram with tomosynthesis:  Right breast: No mammographic evidence for malignancy.     Left breast: In  the lower central posterior aspect of the left breast  there is a round mass that needs further evaluation with ultrasound. A  few smaller adjacent round nodules are present as well. There are also  a few benign-appearing calcifications that are seen in the  retroareolar region inferiorly that appear to be at or near the skin.     Left breast ultrasound: In the 6:00 position 3 cm from the nipple  there is a 12 x 11 x 19 mm oval heterogeneous mass that correlates  with the largest mass seen on previous mammogram. The surrounding  tissue is heterogeneous. Only one definitive adjacent nodule is seen.  This is approximately 4 mm away from the main mass and is bilobed and  measures 9 x 4 x 5 mm. No other definable masses are seen.                                                                      IMPRESSION:  Dominant mass in the 6:00 position of the left breast  with one definable adjacent mass on ultrasound. There are multiple  smaller nodular areas seen adjacent to this on the mammogram.  Ultrasound-guided biopsy is recommended.      BI-RADS CATEGORY: 4 - Suspicious.     RECOMMENDED FOLLOW-UP: Core biopsy of the left breast.     REJI BECKER MD         SYSTEM ID:  [id number]          Final Diagnosis   A.  Left breast mass, ultrasound-guided core biopsy:  -Fragmented portions of carcinoma with focal solid papillary and mucinous features.  -Associated stroma with fibrosis, hemosiderin and acute hemorrhage suggesting the wall of a cyst.    -Surgical excision is needed for definitive diagnosis and subclassification (see Comment).   Electronically signed by Ekta Eldridge MD on 2/16/2024 at  4:51 PM   Comment    The findings in this biopsy confirm malignancy but the exact subclassification and the distinction between in situ and invasive carcinoma cannot be determined due to the nature of the tumor cells, the presence of a possible cyst wall, and the disrupted nature of the biopsy fragments.  The differential  diagnosis includes solid papillary carcinoma with mucinous features and a combination of both solid papillary carcinoma and mucinous carcinoma.  Complete surgical excision is needed for definitive diagnosis.  The complex left breast ultrasound findings are also noted (see Clinical Information below).    Clinical Information               Assessment/plan:  This is a 64-year-old lady well-known to me for a bowel obstruction who returns with a left breast cancer.  The type of carcinoma was indeterminant on the core biopsy was suggestive of a papillary carcinoma but the type of carcinoma was indeterminate.  The mass is of significant size but the patient is amenable to breast conserving therapy.  In view of her family history I recommended an MRI of the breast to determine the full extent of the disease prior to proceeding to any procedure.  The patient's not sure if she wishes to do an MRI.  I also discussed the options of going straight to mastectomy and skipping the MRI.  I also discussed the need for a sentinel node biopsy and she expressed understanding.  After extensive discussion with the patient the plan at this time is to have her talk to her family and decide how she wishes to proceed.  An MRI will be scheduled which she knows she can cancel if she wishes just to proceed to mastectomy.  Reconstruction was also discussed and she wishes to think about that as well.  She will follow-up with me after she makes a decision or her MRI.    Philippe Esquivel MD, FACS

## 2024-02-26 NOTE — LETTER
2/26/2024         RE: Jinny Smith  [address]  [place] [facility] [address]        Dear Colleague,    Thank you for referring your patient, Jinny Smith, to the [facility]. Please see a copy of my visit note below.    Follow-up for left breast mass    Subjective:  Patient is well-known to me for an exploratory laparotomy secondary to bowel obstruction in January of this year.  On that CT a left breast mass was detected.  Subsequent imaging and core biopsy revealed portions of carcinoma with solid and mucinous features.  Patient states she did not notice the mass until the recent mammogram.  There is a family history of breast cancer in her mother who developed it in her 60s.  She now presents to me for evaluation of her left breast cancer.  No right breast complaints.      Objective:  B/P: 122/82, T: 97.8, P: Data Unavailable, R: Data Unavailable  Chest: Right breast: No masses or adenopathy.  Left breast: 3 x 3 x 2 cm firm mass 6 o'clock position just below nipple.  No adenopathy.  Mobile.  Some ecchymoses from biopsy.    PATHOLOGIC RESULT: Fragmented portions of carcinoma with solid  papillary and mucinous features.     RADIOLOGIC CORRELATION: Concordant.     RECOMMENDATION: Surgical consultation. As there is additional  nodularity seen about the primary mass on the mammogram measuring up  to 3.5 x 2.8 cm that was not appreciated on ultrasound, MRI would be  recommended to fully evaluate disease extent.     REJI BECKER MD         SYSTEM ID:  [id number]   Addended by Reji Becker MD on 2/19/2024  3:40 PM     Study Result    Narrative & Impression   ULTRASOUND-GUIDED LEFT BREAST CORE BIOPSY;   BIOPSY MARKER PLACEMENT;   POST PROCEDURE DIGITAL MAMMOGRAM; 2/13/2024 2:15 PM     INDICATION FOR PROCEDURE: Indeterminate mass left breast for which  biopsy recommended.     LESION DESCRIPTION: Mass  LESION LOCATION: 6:00, 3 cm from nipple  LESION DIMENSIONS: 19 x 12 x 11 mm      PROCEDURE: Informed consent was obtained. The skin was prepped and  draped in sterile fashion. 5 mL of 1% lidocaine was utilized for local  anesthesia. A 13-gauge trocar was introduced under direct ultrasound  guidance. A series of 5 samples were obtained with a 14-gauge  core-cutting needle. A coil-shaped clip was deployed to devonte the  lesion.      Post biopsy unilateral digital mammogram of the left breast showed the  clip to be inappropriate position.  The patient tolerated the  procedure well. No immediate complication. No significant blood loss.  No pain following biopsy.                                                                      IMPRESSION: Successful left breast ultrasound-guided core biopsy and  biopsy marker placement. Pathology pending.       LITA RICHARDSON MD         SYSTEM ID:  [id number]       TATIANA YADAV  Accession #: [id number], [id number]     Addendum: The subtle nodular opacities seen about the dominant mass  cover an area of roughly 3.5 x 2.8 cm on the CC view. Depending on  pathology from the biopsy, MRI may be useful to further evaluate  disease extent.     REJI BECKER MD  (Date of addendum: 2/1/2024)     REJI BECKER MD         SYSTEM ID:  [id number]   Addended by Reji Becker MD on 2/1/2024  4:12 PM   TATIANA YADAV  Accession #: [id number], [id number]     Addendum: The subtle nodular opacities seen about the dominant mass  cover an area of roughly 3.5 x 2.8 cm on the CC view. Depending on  pathology from the biopsy, MRI may be useful to further evaluate  disease extent.     REJI BECKER MD  (Date of addendum: 2/1/2024)     REJI BECKER MD         SYSTEM ID:  [id number]   Addended by Reji Becker MD on 2/1/2024  4:12 PM     Study Result    Narrative & Impression   DIAGNOSTIC BILATERAL MAMMOGRAM WITH HANK;   ULTRASOUND LEFT BREAST LIMITED 1-3 QUADRANTS  1/31/2024 2:55 PM     HISTORY:  Previous CT shows mass in the lower left  breast.     COMPARISON:  11/21/2013 and 8/16/2012     BREAST DENSITY: Scattered fibroglandular densities.     FINDINGS:    Bilateral diagnostic mammogram with tomosynthesis:  Right breast: No mammographic evidence for malignancy.     Left breast: In the lower central posterior aspect of the left breast  there is a round mass that needs further evaluation with ultrasound. A  few smaller adjacent round nodules are present as well. There are also  a few benign-appearing calcifications that are seen in the  retroareolar region inferiorly that appear to be at or near the skin.     Left breast ultrasound: In the 6:00 position 3 cm from the nipple  there is a 12 x 11 x 19 mm oval heterogeneous mass that correlates  with the largest mass seen on previous mammogram. The surrounding  tissue is heterogeneous. Only one definitive adjacent nodule is seen.  This is approximately 4 mm away from the main mass and is bilobed and  measures 9 x 4 x 5 mm. No other definable masses are seen.                                                                      IMPRESSION:  Dominant mass in the 6:00 position of the left breast  with one definable adjacent mass on ultrasound. There are multiple  smaller nodular areas seen adjacent to this on the mammogram.  Ultrasound-guided biopsy is recommended.      BI-RADS CATEGORY: 4 - Suspicious.     RECOMMENDED FOLLOW-UP: Core biopsy of the left breast.     REJI BECKER MD         SYSTEM ID:  [id number]          Final Diagnosis   A.  Left breast mass, ultrasound-guided core biopsy:  -Fragmented portions of carcinoma with focal solid papillary and mucinous features.  -Associated stroma with fibrosis, hemosiderin and acute hemorrhage suggesting the wall of a cyst.    -Surgical excision is needed for definitive diagnosis and subclassification (see Comment).   Electronically signed by Ekta Eldridge MD on 2/16/2024 at  4:51 PM   Comment    The findings in this biopsy confirm malignancy but the exact  subclassification and the distinction between in situ and invasive carcinoma cannot be determined due to the nature of the tumor cells, the presence of a possible cyst wall, and the disrupted nature of the biopsy fragments.  The differential diagnosis includes solid papillary carcinoma with mucinous features and a combination of both solid papillary carcinoma and mucinous carcinoma.  Complete surgical excision is needed for definitive diagnosis.  The complex left breast ultrasound findings are also noted (see Clinical Information below).    Clinical Information               Assessment/plan:  This is a 64-year-old lady well-known to me for a bowel obstruction who returns with a left breast cancer.  The type of carcinoma was indeterminant on the core biopsy was suggestive of a papillary carcinoma but the type of carcinoma was indeterminate.  The mass is of significant size but the patient is amenable to breast conserving therapy.  In view of her family history I recommended an MRI of the breast to determine the full extent of the disease prior to proceeding to any procedure.  The patient's not sure if she wishes to do an MRI.  I also discussed the options of going straight to mastectomy and skipping the MRI.  I also discussed the need for a sentinel node biopsy and she expressed understanding.  After extensive discussion with the patient the plan at this time is to have her talk to her family and decide how she wishes to proceed.  An MRI will be scheduled which she knows she can cancel if she wishes just to proceed to mastectomy.  Reconstruction was also discussed and she wishes to think about that as well.  She will follow-up with me after she makes a decision or her MRI.    Philippe Esquivel MD, FACS      Again, thank you for allowing me to participate in the care of your patient.        Sincerely,        Philippe Esquivel MD

## 2024-02-27 ENCOUNTER — OFFICE VISIT (OUTPATIENT)
Dept: INTERNAL MEDICINE | Facility: CLINIC | Age: 64
End: 2024-02-27
Payer: MEDICARE

## 2024-02-27 VITALS
WEIGHT: 164.2 LBS | DIASTOLIC BLOOD PRESSURE: 80 MMHG | HEIGHT: 63 IN | BODY MASS INDEX: 29.09 KG/M2 | TEMPERATURE: 97.7 F | RESPIRATION RATE: 16 BRPM | HEART RATE: 88 BPM | OXYGEN SATURATION: 97 % | SYSTOLIC BLOOD PRESSURE: 138 MMHG

## 2024-02-27 DIAGNOSIS — E78.5 HYPERLIPIDEMIA LDL GOAL <130: ICD-10-CM

## 2024-02-27 DIAGNOSIS — Z01.818 PRE-OPERATIVE EXAMINATION: ICD-10-CM

## 2024-02-27 DIAGNOSIS — N63.20 MASS OF LEFT BREAST, UNSPECIFIED QUADRANT: ICD-10-CM

## 2024-02-27 DIAGNOSIS — I10 HYPERTENSION GOAL BP (BLOOD PRESSURE) < 140/80: ICD-10-CM

## 2024-02-27 DIAGNOSIS — K56.609 SBO (SMALL BOWEL OBSTRUCTION) (H): ICD-10-CM

## 2024-02-27 DIAGNOSIS — Z00.00 MEDICARE ANNUAL WELLNESS VISIT, SUBSEQUENT: Primary | ICD-10-CM

## 2024-02-27 DIAGNOSIS — F17.200 TOBACCO USE DISORDER: ICD-10-CM

## 2024-02-27 PROCEDURE — 99214 OFFICE O/P EST MOD 30 MIN: CPT | Mod: 25 | Performed by: INTERNAL MEDICINE

## 2024-02-27 PROCEDURE — G0438 PPPS, INITIAL VISIT: HCPCS | Performed by: INTERNAL MEDICINE

## 2024-02-27 PROCEDURE — G0009 ADMIN PNEUMOCOCCAL VACCINE: HCPCS | Performed by: INTERNAL MEDICINE

## 2024-02-27 PROCEDURE — 90677 PCV20 VACCINE IM: CPT | Performed by: INTERNAL MEDICINE

## 2024-02-27 RX ORDER — LISINOPRIL 20 MG/1
40 TABLET ORAL DAILY
Qty: 180 TABLET | Refills: 2 | Status: SHIPPED | OUTPATIENT
Start: 2024-02-27

## 2024-02-27 RX ORDER — RESPIRATORY SYNCYTIAL VIRUS VACCINE 120MCG/0.5
0.5 KIT INTRAMUSCULAR ONCE
Qty: 1 EACH | Refills: 0 | Status: CANCELLED | OUTPATIENT
Start: 2024-02-27 | End: 2024-02-27

## 2024-02-27 SDOH — HEALTH STABILITY: PHYSICAL HEALTH: ON AVERAGE, HOW MANY MINUTES DO YOU ENGAGE IN EXERCISE AT THIS LEVEL?: PATIENT DECLINED

## 2024-02-27 SDOH — HEALTH STABILITY: PHYSICAL HEALTH: ON AVERAGE, HOW MANY DAYS PER WEEK DO YOU ENGAGE IN MODERATE TO STRENUOUS EXERCISE (LIKE A BRISK WALK)?: 2 DAYS

## 2024-02-27 SDOH — HEALTH STABILITY: PHYSICAL HEALTH
ON AVERAGE, HOW MANY DAYS PER WEEK DO YOU ENGAGE IN MODERATE TO STRENUOUS EXERCISE (LIKE A BRISK WALK)?: PATIENT DECLINED

## 2024-02-27 ASSESSMENT — PAIN SCALES - GENERAL: PAINLEVEL: NO PAIN (0)

## 2024-02-27 ASSESSMENT — SOCIAL DETERMINANTS OF HEALTH (SDOH): HOW OFTEN DO YOU GET TOGETHER WITH FRIENDS OR RELATIVES?: ONCE A WEEK

## 2024-02-27 NOTE — PROGRESS NOTES
"Preventive Care Visit  [facility]  Celso Anaya MD, Internal Medicine  Feb 27, 2024    Assessment & Plan   Problem List Items Addressed This Visit       Hypertension goal BP (blood pressure) < 140/80    Relevant Medications    lisinopril (ZESTRIL) 20 MG tablet    Hyperlipidemia LDL goal <130    SBO (small bowel obstruction) (H)    Tobacco use disorder    Mass of left breast, unspecified quadrant     Other Visit Diagnoses       Medicare annual wellness visit, subsequent    -  Primary    Pre-operative examination               Patient is here for Medicare wellness exam.  She is doing okay, walking on the right, memory is okay.  She will get a pneumonia shot today.  She is not on any COVID shot has had a flu shot.    Will wait on the colonoscopy.    Other issues addressed we did do a preop examination for possible masectomy on the left side for a left breast cancer.  She is relatively low risk for this procedure.  She will hold her lisinopril that day.  EKG was performed in early January and stable.  Labs have been good.    Hypertension slightly high we will increase her lisinopril from 20-40 to get better control.  Hyperlipidemia on atorvastatin can follow-up with repeat fasting lipids in the future.    Small bowel obstruction is completely resolved doing well    Tobacco use she has quit smoking and is congratulated on this.              Patient has been advised of split billing requirements and indicates understanding: Yes         BMI  Estimated body mass index is 28.86 kg/m  as calculated from the following:    Height as of this encounter: 1.607 m (5' 3.25\").    Weight as of this encounter: 74.5 kg (164 lb 3.2 oz).   Weight management plan: Discussed healthy diet and exercise guidelines    Counseling  Appropriate preventive services were discussed with this patient, including applicable screening as appropriate for fall prevention, nutrition, physical activity, Tobacco-use " cessation, weight loss and cognition.  Checklist reviewing preventive services available has been given to the patient.  Reviewed patient's diet, addressing concerns and/or questions.   She is at risk for lack of exercise and has been provided with information to increase physical activity for the benefit of her well-being.   The patient was instructed to see the dentist every 6 months.   She is at risk for psychosocial distress and has been provided with information to reduce risk.   Discussed possible causes of fatigue.   FUTURE APPOINTMENTS:       - Follow-up visit in 2-3 months      Junior Stearns is a 64 year old, presenting for the following:  Wellness Visit        2/27/2024     2:07 PM   Additional Questions   Roomed by Melissa MCMULLEN         2/27/2024     2:07 PM   Patient Reported Additional Medications   Patient reports taking the following new medications Tylenol, prn       Health Care Directive  Patient does not have a Health Care Directive or Living Will: Discussed advance care planning with patient; however, patient declined at this time.    HPI    , retired lives in [place].     Had a SBO and surgery, doing well now.     Left breast cancer seen on ct and biopsy positive.  Saw surgery and needs mass removed. Leaning toward masectomy.      BP and cholesterol are treated.               2/27/2024   General Health   How would you rate your overall physical health? Good   Feel stress (tense, anxious, or unable to sleep) Only a little   (!) STRESS CONCERN      2/27/2024   Nutrition   Diet: Regular (no restrictions)         2/27/2024   Exercise   Days per week of moderate/strenous exercise 2 days   Average minutes spent exercising at this level Patient declined   (!) EXERCISE CONCERN      2/27/2024   Social Factors   Worry food won't last until get money to buy more No   Food not last or not have enough money for food? No   Do you have housing?  Yes   Are you worried about losing your housing? No    Lack of transportation? No   Unable to get utilities (heat,electricity)? No         2024   Fall Risk   Fallen 2 or more times in the past year? No    No   Trouble with walking or balance? No    No          2024   Activities of Daily Living- Home Safety   Needs help with the following daily activites None of the above   Safety concerns in the home None of the above         2024   Dental   Dentist two times every year? (!) NO         2024   Hearing Screening   Hearing concerns? None of the above         2024   Driving Risk Screening   Patient/family members have concerns about driving No         2024   General Alertness/Fatigue Screening   Have you been more tired than usual lately? (!) YES         2024   Urinary Incontinence Screening   Bothered by leaking urine in past 6 months No          Today's PHQ-2 Score:       2024     2:49 PM   PHQ-2 (  [employer])   Q1: Little interest or pleasure in doing things 0   Q2: Feeling down, depressed or hopeless 0   PHQ-2 Score 0   Q1: Little interest or pleasure in doing things Not at all   Q2: Feeling down, depressed or hopeless Not at all   PHQ-2 Score 0         2024   Substance Use   Alcohol more than 3/day or more than 7/wk No   Do you have a current opioid prescription? No   How severe/bad is pain from 1 to 10? 0/10 (No Pain)   Do you use any other substances recreationally? No     Social History     Tobacco Use    Smoking status: Former     Packs/day: .5     Types: Cigarettes     Quit date: 2023     Years since quittin.1    Smokeless tobacco: Former     Quit date: 2013   Vaping Use    Vaping Use: Never used   Substance Use Topics    Alcohol use: Yes    Drug use: No   Drinks alcohol a few times a week.           2024   LAST FHS-7 RESULTS   1st degree relative breast or ovarian cancer Yes   Any relative bilateral breast cancer No   Any male have breast cancer No   Any ONE woman have BOTH breast AND ovarian  cancer No   Any woman with breast cancer before 50yrs No   2 or more relatives with breast AND/OR ovarian cancer No   2 or more relatives with breast AND/OR bowel cancer Yes     Mammogram Screening - Annual screen due to history of breast cancer, carcinoma in situ, or hyperplasia    ASCVD Risk   The 10-year ASCVD risk score (Shirley ADAM, et al., 2019) is: 12.7%    Values used to calculate the score:      Age: 64 years      Sex: Female      Is Non- : No      Diabetic: No      Tobacco smoker: No      Systolic Blood Pressure: 148 mmHg      Is BP treated: Yes      HDL Cholesterol: 47 mg/dL      Total Cholesterol: 243 mg/dL    Reviewed and updated as needed this visit by Provider                    Current providers sharing in care for this patient include:  Patient Care Team:  No Ref-Primary, Physician as PCP - General  [facility] as Assigned PCP  Philippe Esquivel MD as Assigned Heart and Vascular Provider  Sweta Brand DO as Physician (Medical Oncology)    The following health maintenance items are reviewed in Epic and correct as of today:  Health Maintenance   Topic Date Due    DEXA  Never done    Pneumococcal Vaccine: 65+ Years (1 of 2 - PCV) Never done    ZOSTER IMMUNIZATION (1 of 2) Never done    LUNG CANCER SCREENING  Never done    RSV VACCINE (Pregnancy & 60+) (1 - 1-dose 60+ series) Never done    ADVANCE CARE PLANNING  06/25/2018    COLORECTAL CANCER SCREENING  11/13/2018    COVID-19 Vaccine (3 - Moderna risk series) 06/22/2021    MEDICARE ANNUAL WELLNESS VISIT  Never done    DTAP/TDAP/TD IMMUNIZATION (2 - Td or Tdap) 07/02/2023    ALT  12/31/2024    ANNUAL REVIEW OF HM ORDERS  01/16/2025    BMP  01/30/2025    FALL RISK ASSESSMENT  02/27/2025    MAMMO SCREENING  01/31/2026    GLUCOSE  01/30/2027    LIPID  01/30/2029    HEPATITIS C SCREENING  Completed    PHQ-2 (once per calendar year)  Completed    IPV IMMUNIZATION  Aged Out    HPV IMMUNIZATION  Aged  "Out    MENINGITIS IMMUNIZATION  Aged Out    RSV MONOCLONAL ANTIBODY  Aged Out    INFLUENZA VACCINE  Discontinued         Review of Systems  CONSTITUTIONAL: NEGATIVE for fever, chills, change in weight  INTEGUMENTARY/SKIN: NEGATIVE for worrisome rashes, moles or lesions  EYES: NEGATIVE for vision changes or irritation  ENT/MOUTH: NEGATIVE for ear, mouth and throat problems  RESP: NEGATIVE for significant cough or SOB  BREAST: breast mass left side   CV: NEGATIVE for chest pain, palpitations or peripheral edema  GI: NEGATIVE for nausea, abdominal pain, heartburn, or change in bowel habits  : NEGATIVE for frequency, dysuria, or hematuria  MUSCULOSKELETAL:knee pains   NEURO: NEGATIVE for weakness, dizziness or paresthesias  ENDOCRINE: NEGATIVE for temperature intolerance, skin/hair changes  HEME: NEGATIVE for bleeding problems  PSYCHIATRIC: NEGATIVE for changes in mood or affect     Objective    Exam  BP (!) 148/80 (BP Location: Left arm, Patient Position: Chair)   Pulse 88   Temp 97.7  F (36.5  C) (Temporal)   Resp 16   Ht 1.607 m (5' 3.25\")   Wt 74.5 kg (164 lb 3.2 oz)   SpO2 97%   BMI 28.86 kg/m     Estimated body mass index is 28.86 kg/m  as calculated from the following:    Height as of this encounter: 1.607 m (5' 3.25\").    Weight as of this encounter: 74.5 kg (164 lb 3.2 oz).    Physical Exam  GENERAL: alert and no distress  EYES: Eyes grossly normal to inspection, PERRL and conjunctivae and sclerae normal  HENT: ear canals and TM's normal, nose and mouth without ulcers or lesions  NECK: no adenopathy, no asymmetry, masses, or scars  RESP: lungs clear to auscultation - no rales, rhonchi or wheezes  CV: regular rate and rhythm, normal S1 S2, no S3 or S4, no murmur, click or rub, no peripheral edema  ABDOMEN: soft, nontender, no hepatosplenomegaly, no masses and bowel sounds normal  MS: no gross musculoskeletal defects noted, no edema  SKIN: no suspicious lesions or rashes  NEURO: Normal strength and tone, " mentation intact and speech normal  PSYCH: mentation appears normal, affect normal/bright        2/27/2024   Mini Cog   Clock Draw Score 2 Normal   3 Item Recall 2 objects recalled   Mini Cog Total Score 4            Signed Electronically by: Celso Anaya MD

## 2024-02-27 NOTE — NURSING NOTE
Prior to immunization administration, verified patients identity using patient s name and date of birth. Please see Immunization Activity for additional information.     Screening Questionnaire for Adult Immunization    Are you sick today?   No   Do you have allergies to medications, food, a vaccine component or latex?   No   Have you ever had a serious reaction after receiving a vaccination?   No   Do you have a long-term health problem with heart, lung, kidney, or metabolic disease (e.g., diabetes), asthma, a blood disorder, no spleen, complement component deficiency, a cochlear implant, or a spinal fluid leak?  Are you on long-term aspirin therapy?   No   Do you have cancer, leukemia, HIV/AIDS, or any other immune system problem?   Yes   Do you have a parent, brother, or sister with an immune system problem?   No   In the past 3 months, have you taken medications that affect  your immune system, such as prednisone, other steroids, or anticancer drugs; drugs for the treatment of rheumatoid arthritis, Crohn s disease, or psoriasis; or have you had radiation treatments?   No   Have you had a seizure, or a brain or other nervous system problem?   No   During the past year, have you received a transfusion of blood or blood    products, or been given immune (gamma) globulin or antiviral drug?   No   For women: Are you pregnant or is there a chance you could become       pregnant during the next month?   No   Have you received any vaccinations in the past 4 weeks?   No     Immunization questionnaire was positive for at least one answer.  Notified Dr. Anaya.      Patient instructed to remain in clinic for 15 minutes afterwards, and to report any adverse reactions.     Screening performed by Reshma Pemberton MA on 2/27/2024 at 3:04 PM.

## 2024-03-04 ENCOUNTER — TELEPHONE (OUTPATIENT)
Dept: SURGERY | Facility: CLINIC | Age: 64
End: 2024-03-04
Payer: MEDICARE

## 2024-03-04 DIAGNOSIS — Z80.3 FHX: BREAST CANCER IN FIRST DEGREE RELATIVE: ICD-10-CM

## 2024-03-04 DIAGNOSIS — C50.112 MALIGNANT NEOPLASM OF CENTRAL PORTION OF LEFT FEMALE BREAST, UNSPECIFIED ESTROGEN RECEPTOR STATUS (H): Primary | ICD-10-CM

## 2024-03-04 NOTE — TELEPHONE ENCOUNTER
Reason for Call:  Other mastectomy    Detailed comments: Jinny has decided to do the mastectomy. Can you please place the order for the surgery so she can schedule the procedure.    Phone Number Patient can be reached at: Home number on file [phone] (home)    Best Time:     Can we leave a detailed message on this number? YES    Call taken on 3/4/2024 at 2:35 PM by Tameka Shine

## 2024-03-04 NOTE — TELEPHONE ENCOUNTER
Patient called wanting to proceed to mastectomy.  I also discussed the options of reconstruction and she does not wish to pursue that either.  After discussion with the patient the plan at this time is for a left breast mastectomy and sentinel node biopsy.   The procedure, risks, benefits, and alternatives were discussed and the patient agrees to proceed.      Philippe Esquivel MD, FACS

## 2024-03-05 ENCOUNTER — HOSPITAL ENCOUNTER (INPATIENT)
Facility: CLINIC | Age: 64
Setting detail: SURGERY ADMIT
End: 2024-03-05
Attending: SPECIALIST | Admitting: SPECIALIST
Payer: MEDICARE

## 2024-03-05 ENCOUNTER — MYC MEDICAL ADVICE (OUTPATIENT)
Dept: FAMILY MEDICINE | Facility: CLINIC | Age: 64
End: 2024-03-05
Payer: MEDICARE

## 2024-03-05 ENCOUNTER — TELEPHONE (OUTPATIENT)
Dept: SURGERY | Facility: CLINIC | Age: 64
End: 2024-03-05
Payer: MEDICARE

## 2024-03-05 NOTE — TELEPHONE ENCOUNTER
RECORDS STATUS - BREAST    RECORDS REQUESTED FROM: Epic   DATE REQUESTED:    NOTES DETAILS STATUS   OFFICE NOTE from referring provider Epic 1/16/24: Josiane Galvez NP   OFFICE NOTE from surgeon [facility] 2/26/24: Dr. Philippe Esquivel   OPERATIVE REPORT Epic 2/13/24: US Breast Bx   MEDICATION LIST [facility]    LABS     PATHOLOGY REPORTS  (Tissue diagnosis, Stage, ER/WA percentage positive and intensity of staining, HER2 IHC, FISH, and all biopsies from breast and any distant metastasis)                 Report in [facility] 2/13/24: [id number]    IMAGING (NEED IMAGES & REPORT)     MAMMO PACS 2/13/24, 11/21/13   ULTRASOUND PACS 2/13/24: US Breast Bx  1/31/24: US Breast

## 2024-03-05 NOTE — TELEPHONE ENCOUNTER
Type of surgery: MASTECTOMY, SIMPLE, WITH SENTINEL LYMPH NODE BIOPSY   Location of surgery: [facility]  Date and time of surgery: 3/27  9:30  Surgeon: Alvin  Pre-Op Appt Date: 2/27  Post-Op Appt Date: 4/8  Packet sent out: Yes  Pre-cert/Authorization completed:  Not Applicable  Date: na  Nuc med injection scheduled for 7:30

## 2024-03-13 ENCOUNTER — PRE VISIT (OUTPATIENT)
Dept: ONCOLOGY | Facility: CLINIC | Age: 64
End: 2024-03-13

## 2024-03-14 NOTE — PROGRESS NOTES
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 Pt is s/p hysterectomy and possible BSO in 1980s for ovarian cysts and reported dysplasia ?location.     - 12/31/23 CT AP w/contrast for abd pain, N/V:   incidental finding \"breast mass in the left inferior breast at approximately the 6:00 position measuring 1.5 x 1.1 cm\"  - 1/31/24 Diagnostic bilateral breast mammogram:  Left breast, lower central posterior aspect, round mass and a few smaller adjacent round nodules. In the retro-areolar region, inferiorly, at or near skin, few benign appearing calcifications  - 1/31/24 Targeted LEFT breast ultrasound:  Left breast, In the 6:00 position 3 cm from the nipple there is a 12 x 11 x 19 mm oval heterogeneous mass that correlates with the largest mass seen on previous mammogram. The surrounding tissue is heterogeneous. Only one definitive adjacent nodule is seen.  This is approximately 4 mm away from the main mass and is bilobed and measures 9 x 4 x 5 mm.  The subtle nodular opacities seen about the dominant mass cover an area of roughly 3.5 x 2.8 cm on the CC view. No other definable masses are seen.  - 2/13/24 Ultrasound-guided LEFT breast core needle biopsy of lesion at 6 oclock, 3 cm from nipple  PATHOLOGY  A. Left breast mass, ultrasound-guided core biopsy:   -Fragmented portions of " carcinoma with focal solid papillary and mucinous features.  -Associated stroma with fibrosis, hemosiderin and acute hemorrhage suggesting the wall of a cyst.    -Surgical excision is needed for definitive diagnosis and subclassification (see Comment).  The neoplastic cells are strongly and diffusely positive for estrogen receptor (91 to 100%). The progesterone receptor is also positive (moderate to strong staining, approximately 75%). Immunohistochemistry for p63 shows scattered positive cells within both the solid and cribriform areas.       Comment    The findings in this biopsy confirm malignancy but the exact subclassification and the distinction between in situ and invasive carcinoma cannot be determined due to the nature of the tumor cells, the presence of a possible cyst wall, and the disrupted nature of the biopsy fragments.  The differential diagnosis includes solid papillary carcinoma with mucinous features and a combination of both solid papillary carcinoma and mucinous carcinoma.  Complete surgical excision is needed for definitive diagnosis.  The complex left breast ultrasound findings are also noted.     - 2/26/24 consult with Dr. Esquivel- recommended MRI breast to assess extent of disease and consider BCT, pt does not wish to do MRI, plan for mastectomy w/SLNBx and no reconstruction on 3/27/24    Lifetime estrogen exposure:  Menarche: 10  Last menstrual period: in her late 20s, 1980s  Age of first pregnancy: 17 y/o  Number of pregnancies: 1  Weight gain: 20lbs in last year  Exposure to exogenous estrogen: OCPs 2 years, estrogen after hysterectomy x10-15 years     Family history of:  1.  Breast cancer including male breast cancer: mother- breast, lumpectomy in her early 70s, RT; paternal 1/2 aunt- breast cancer, details unknown   2. Ovarian cancer: negative  3.  Pancreatic cancer: negative  4.  Prostate cancer: negative  5. Diffuse gastric cancer (if lobular breast CA): negative  6. Uterine cancer:  negative  7. Colon cancer: father- dx in his late 50s,  age 66     Patient denies the following:  History of DVT/PE/VTE for self or family   Cataracts  Severe arthralgias and myalgias  Cardiovascular risk factors including diabetes  Osteoporosis  Current use of antidepressants    Pt has htn, hld. Pt is s/p hysterectomy and possible BSO in  for ovarian cysts and reported dysplasia ?location.     Regarding adrenal nodule:  - 23 CT AP: Indeterminant 1.1 cm left adrenal nodule   - 24 CT Abdomen w/wo contrast: A small round previously seen left adrenal nodule  measures 10 mm and demonstrates an average density of 24 Hounsfield units on unenhanced images, 180 Hounsfield units on early postcontrast imaging, and 67 Hounsfield units on 15 minute delayed postcontrast imaging, which results in an absolute washout of 48.8% (absolute washout less than 60% is indeterminant) and a relative washout of 38% (relative washout less than 40% is indeterminate)  IMPRESSION:   1.  Technically indeterminate 10 mm left adrenal nodule. Recommend correlation with adrenal function studies. Consider follow-up renal MRI for further characterization if clinically indicated. Otherwise, consider six-month follow-up CT to assess for stability       Pt has alcohol a few times a week, prior to that 2 drinks, 4x/week, vodka.         REVIEW OF SYSTEMS:   A 14 point ROS was reviewed with pertinent positives and negatives in the HPI.        HOME MEDICATIONS:  Current Outpatient Medications   Medication Sig Dispense Refill    atorvastatin (LIPITOR) 20 MG tablet Take 1 tablet (20 mg) by mouth daily 90 tablet 3    lisinopril (ZESTRIL) 20 MG tablet Take 2 tablets (40 mg) by mouth daily 180 tablet 2         ALLERGIES:  No Known Allergies      PAST MEDICAL HISTORY:  Past Medical History:   Diagnosis Date    Family history of colon cancer     Hyperlipidemia LDL goal <130 2012    Hyperplastic colonic polyp     Hypertension goal BP (blood  pressure) < 140/80 2012    Knee joint effusion 2012         PAST SURGICAL HISTORY:  Past Surgical History:   Procedure Laterality Date    GYN SURGERY      HYSTERECTOMY TOTAL ABDOMINAL      LAPAROSCOPY DIAGNOSTIC (GENERAL) N/A 2024    Procedure: LAPAROSCOPY, DIAGNOSTIC, laparoscopic lysis of adhesions;  Surgeon: Philippe Esquivel MD;  Location: [facility] OR    LAPAROTOMY, LYSIS ADHESIONS, COMBINED N/A 2024    Procedure: LAPAROTOMY, EXPLORATORY, WITH open LYSIS OF ADHESIONS, reduction of internal hernia;  Surgeon: Philippe Esquivel MD;  Location: [facility] OR         SOCIAL HISTORY:  Social History     Socioeconomic History    Marital status:      Spouse name: Not on file    Number of children: Not on file    Years of education: Not on file    Highest education level: Not on file   Occupational History    Not on file   Tobacco Use    Smoking status: Former     Packs/day: .5     Types: Cigarettes     Quit date: 2023     Years since quittin.2    Smokeless tobacco: Former     Quit date: 2013   Vaping Use    Vaping Use: Never used   Substance and Sexual Activity    Alcohol use: Yes    Drug use: No    Sexual activity: Yes     Partners: Male   Other Topics Concern    Parent/sibling w/ CABG, MI or angioplasty before 65F 55M? Yes   Social History Narrative    Not on file     Social Determinants of Health     Financial Resource Strain: Low Risk  (2024)    Financial Resource Strain     Within the past 12 months, have you or your family members you live with been unable to get utilities (heat, electricity) when it was really needed?: No   Food Insecurity: Low Risk  (2024)    Food Insecurity     Within the past 12 months, did you worry that your food would run out before you got money to buy more?: No     Within the past 12 months, did the food you bought just not last and you didn't have money to get more?: No   Transportation Needs: Low Risk  (2024)    Transportation Needs     Within the  "past 12 months, has lack of transportation kept you from medical appointments, getting your medicines, non-medical meetings or appointments, work, or from getting things that you need?: No   Physical Activity: Unknown (2/27/2024)    Exercise Vital Sign     Days of Exercise per Week: 2 days     Minutes of Exercise per Session: Patient declined   Stress: No Stress Concern Present (2/27/2024)    [employer] [place] [employer] - Occupational Stress Questionnaire     Feeling of Stress : Only a little   Social Connections: Unknown (2/27/2024)    Social Connection and Isolation Panel [NHANES]     Frequency of Communication with Friends and Family: Not on file     Frequency of Social Gatherings with Friends and Family: Once a week     Attends [employer] Services: Not on file     Active Member of Clubs or Organizations: Not on file     Attends Club or Organization Meetings: Not on file     Marital Status: Not on file   Interpersonal Safety: Low Risk  (2/27/2024)    Interpersonal Safety     Do you feel physically and emotionally safe where you currently live?: Yes     Within the past 12 months, have you been hit, slapped, kicked or otherwise physically hurt by someone?: No     Within the past 12 months, have you been humiliated or emotionally abused in other ways by your partner or ex-partner?: No   Housing Stability: Low Risk  (2/27/2024)    Housing Stability     Do you have housing? : Yes     Are you worried about losing your housing?: No         FAMILY HISTORY:  No family history on file.      PHYSICAL EXAM:  Vital signs:  BP (!) 182/100   Pulse 87   Resp 16   Ht 1.6 m (5' 3\")   Wt 76.3 kg (168 lb 3.2 oz)   SpO2 95%   BMI 29.80 kg/m         ECOG:   GENERAL/CONSTITUTIONAL: No acute distress.  EYES: Pupils are equal and round. Extraocular movements intact without nystagmus.  No scleral icterus.  RESPIRATORY: Equal chest rise.   MUSCULOSKELETAL: Warm and well-perfused, no cyanosis, clubbing, or edema. " "  NEUROLOGIC: Cranial nerves are grossly intact. Alert, oriented to person, place and time, answers questions appropriately.  INTEGUMENTARY: No rashes or jaundice.  GAIT: Steady, does not use assistive device  BREAST: left breast 6 oclock, immediately inferior to areola- 3 cm tall and 4cm across ovoid mass with tail extending superior and lateral to areola. No axillary LN      LABS:   Latest Reference Range & Units 01/30/24 12:40   Sodium 135 - 145 mmol/L 143   Potassium 3.4 - 5.3 mmol/L 4.7   Chloride 98 - 107 mmol/L 104   Carbon Dioxide (CO2) 22 - 29 mmol/L 25   Urea Nitrogen 8.0 - 23.0 mg/dL 9.7   Creatinine 0.51 - 0.95 mg/dL 0.72   GFR Estimate >60 mL/min/1.73m2 >90   Calcium 8.8 - 10.2 mg/dL 10.0   Anion Gap 7 - 15 mmol/L 14   Cholesterol <200 mg/dL 243 (H)   Patient Fasting?  Yes   Glucose 70 - 99 mg/dL 116 (H)   HDL Cholesterol >=50 mg/dL 47 (L)   LDL Cholesterol Calculated <=100 mg/dL 154 (H)   Non HDL Cholesterol <130 mg/dL 196 (H)   Triglycerides <150 mg/dL 211 (H)   Hepatitis C Antibody Nonreactive  Nonreactive   HIV Antigen Antibody Combo Nonreactive  Nonreactive   (H): Data is abnormally high  (L): Data is abnormally low      PATHOLOGY:      IMAGING:      ASSESSMENT/PLAN:  Jinny Smith is a 64 year old female with:    # LEFT breast carcinoma, ER positive, RI positive   - 12/31/23 CT AP w/contrast for abd pain, N/V:  incidental finding \"breast mass in the left inferior breast at approximately the 6:00 position measuring 1.5 x 1.1 cm\"  - 1/31/24 Diagnostic bilateral breast mammogram, targeted left breast US:  Left breast, lower central posterior aspect, 6 oclock position, 3 cm from nipple, 1.2x1.1x1.9cm heterogenous round/oval mass and a few smaller adjacent round nodules- on US one seen 0.4cm away from main mass, 0.9x0.4x0.5cm. The subtle nodular opacities seen about the dominant mass cover an area of roughly 3.5 x 2.8 cm on the CC view. In the retro-areolar region, inferiorly, at or near skin, few " benign appearing calcifications. No comment on LN  - 2/13/24 Ultrasound-guided LEFT breast core needle biopsy of lesion at 6 oclock, 3 cm from nipple, PATHOLOGY: Fragmented portions of carcinoma with focal solid papillary and mucinous features. Associated stroma with fibrosis, hemosiderin and acute hemorrhage suggesting the wall of a cyst.  ER (%, strong), ID (75%, moderate to strong). The findings in this biopsy confirm malignancy but the exact subclassification and the distinction between in situ and invasive carcinoma cannot be determined due to the nature of the tumor cells, the presence of a possible cyst wall, and the disrupted nature of the biopsy fragments.  The differential diagnosis includes solid papillary carcinoma with mucinous features and a combination of both solid papillary carcinoma and mucinous carcinoma.   - 2/26/24 consult with Dr. Esquivel- recommended MRI breast to assess extent of disease and consider BCT, pt does not wish to do MRI, plan for mastectomy w/SLNBx and no reconstruction on 3/27/24    - 1/24 CBC , CMP WNL, lipid panel: total cholesterol 243, , , HDL 47    PLAN:  - 3/272/4 pending mastectomy w/SLNBx  - we re-discussed utility of breast MRI today, she is agreeable to attempting it with premedication prior (ativan) to see if she can tolerate it. I have reached out to Dr. Esquivel regarding this as it may affect the surgical plan.   - if pt has invasive disease, needs her2 testing  - if invasive disease that is ER positive, her2 negative and 0-3 positive LN, will send oncotype  - depending on surgical pathology, pt may need adjuvant radiation  - regardless of the above, pt will need at least 5 years of adjuvant endocrine therapy. Will discuss details at next visit per pt preference   - of note, pt has htn, hld, s/p hysterectomy + BSO in 1990s for dysplasia   - at time of next visit, check CBC, CMP, hgbA1c  - start calcium and vitamin D OTC  - check baseline  DEXA    #macrocytosis w/o anemia  - 1/24 CBC hgb 11.4->12.1 w/-102    PLAN:  - check CBC, B12, RBC folate, copper, zinc, absolute retic, TSH, peripheral smear    #hld  - on atorvastatin     #left adrenal nodule  - 1/24 CT A w/o contrast: Technically indeterminate 10 mm left adrenal nodule. Recommend correlation with adrenal function studies. Consider follow-up renal  MRI for further characterization if clinically indicated. Otherwise,  consider six-month follow-up CT to assess for stability.  - pt does not want to do MRI, planning on repeat CT w/PCP 7/24    #family hx of cancer  - mother- breast cancer, paternal 1/2 aunt- breast cancer, details unknown; father colon cancer in his 50s  - genetic counseling referral       RTC 4/16 for follow up with me (after DEXA)- in person, [place]     ADDENDUM:  MRI moved up from 3/25 to today 3/15    ADDENDUM:  MRI attempted w/premedication w/ativan, pt unable to tolerate. Does not wish to reattempt MRI. Will proceed with previous surgical plan.    Sweta Brand, DO  Hematology/Oncology  [facility]

## 2024-03-15 ENCOUNTER — PATIENT OUTREACH (OUTPATIENT)
Dept: ONCOLOGY | Facility: CLINIC | Age: 64
End: 2024-03-15

## 2024-03-15 ENCOUNTER — DOCUMENTATION ONLY (OUTPATIENT)
Dept: MRI IMAGING | Facility: CLINIC | Age: 64
End: 2024-03-15

## 2024-03-15 ENCOUNTER — ONCOLOGY VISIT (OUTPATIENT)
Dept: ONCOLOGY | Facility: CLINIC | Age: 64
End: 2024-03-15
Attending: INTERNAL MEDICINE
Payer: MEDICARE

## 2024-03-15 VITALS
SYSTOLIC BLOOD PRESSURE: 182 MMHG | DIASTOLIC BLOOD PRESSURE: 100 MMHG | OXYGEN SATURATION: 95 % | RESPIRATION RATE: 16 BRPM | BODY MASS INDEX: 29.8 KG/M2 | HEIGHT: 63 IN | WEIGHT: 168.2 LBS | HEART RATE: 87 BPM

## 2024-03-15 DIAGNOSIS — D53.9 MACROCYTIC ANEMIA: ICD-10-CM

## 2024-03-15 DIAGNOSIS — R73.09 OTHER ABNORMAL GLUCOSE: ICD-10-CM

## 2024-03-15 DIAGNOSIS — Z78.0 POST-MENOPAUSAL: ICD-10-CM

## 2024-03-15 DIAGNOSIS — R63.39 OTHER FEEDING DIFFICULTIES: ICD-10-CM

## 2024-03-15 DIAGNOSIS — C50.112 MALIGNANT NEOPLASM OF CENTRAL PORTION OF LEFT FEMALE BREAST, UNSPECIFIED ESTROGEN RECEPTOR STATUS (H): Primary | ICD-10-CM

## 2024-03-15 DIAGNOSIS — Z80.0 FAMILY HISTORY OF COLON CANCER: ICD-10-CM

## 2024-03-15 LAB
ALBUMIN SERPL BCG-MCNC: 4.7 G/DL (ref 3.5–5.2)
ALP SERPL-CCNC: 95 U/L (ref 40–150)
ALT SERPL W P-5'-P-CCNC: 15 U/L (ref 0–50)
ANION GAP SERPL CALCULATED.3IONS-SCNC: 14 MMOL/L (ref 7–15)
AST SERPL W P-5'-P-CCNC: 19 U/L (ref 0–45)
BASOPHILS # BLD AUTO: 0 10E3/UL (ref 0–0.2)
BASOPHILS NFR BLD AUTO: 0 %
BILIRUB SERPL-MCNC: 0.5 MG/DL
BUN SERPL-MCNC: 13.9 MG/DL (ref 8–23)
CALCIUM SERPL-MCNC: 9.9 MG/DL (ref 8.8–10.2)
CHLORIDE SERPL-SCNC: 101 MMOL/L (ref 98–107)
CREAT SERPL-MCNC: 0.7 MG/DL (ref 0.51–0.95)
DEPRECATED HCO3 PLAS-SCNC: 23 MMOL/L (ref 22–29)
EGFRCR SERPLBLD CKD-EPI 2021: >90 ML/MIN/1.73M2
EOSINOPHIL # BLD AUTO: 0.1 10E3/UL (ref 0–0.7)
EOSINOPHIL NFR BLD AUTO: 2 %
ERYTHROCYTE [DISTWIDTH] IN BLOOD BY AUTOMATED COUNT: 12.1 % (ref 10–15)
GLUCOSE SERPL-MCNC: 104 MG/DL (ref 70–99)
HBA1C MFR BLD: 5.6 % (ref 0–5.6)
HCT VFR BLD AUTO: 40.8 % (ref 35–47)
HGB BLD-MCNC: 13.4 G/DL (ref 11.7–15.7)
HOLD SPECIMEN: NORMAL
IMM GRANULOCYTES # BLD: 0 10E3/UL
IMM GRANULOCYTES NFR BLD: 0 %
LYMPHOCYTES # BLD AUTO: 2.7 10E3/UL (ref 0.8–5.3)
LYMPHOCYTES NFR BLD AUTO: 34 %
MCH RBC QN AUTO: 31.5 PG (ref 26.5–33)
MCHC RBC AUTO-ENTMCNC: 32.8 G/DL (ref 31.5–36.5)
MCV RBC AUTO: 96 FL (ref 78–100)
MONOCYTES # BLD AUTO: 0.7 10E3/UL (ref 0–1.3)
MONOCYTES NFR BLD AUTO: 9 %
NEUTROPHILS # BLD AUTO: 4.4 10E3/UL (ref 1.6–8.3)
NEUTROPHILS NFR BLD AUTO: 55 %
NRBC # BLD AUTO: 0 10E3/UL
NRBC BLD AUTO-RTO: 0 /100
PLATELET # BLD AUTO: 280 10E3/UL (ref 150–450)
POTASSIUM SERPL-SCNC: 4.1 MMOL/L (ref 3.4–5.3)
PROT SERPL-MCNC: 8.4 G/DL (ref 6.4–8.3)
RBC # BLD AUTO: 4.26 10E6/UL (ref 3.8–5.2)
RETICS # AUTO: 0.07 10E6/UL
RETICS/RBC NFR AUTO: 1.6 %
SODIUM SERPL-SCNC: 138 MMOL/L (ref 135–145)
TSH SERPL DL<=0.005 MIU/L-ACNC: 1.07 UIU/ML (ref 0.3–4.2)
WBC # BLD AUTO: 7.9 10E3/UL (ref 4–11)

## 2024-03-15 PROCEDURE — 84630 ASSAY OF ZINC: CPT | Performed by: INTERNAL MEDICINE

## 2024-03-15 PROCEDURE — 99204 OFFICE O/P NEW MOD 45 MIN: CPT | Mod: 24 | Performed by: INTERNAL MEDICINE

## 2024-03-15 PROCEDURE — 36415 COLL VENOUS BLD VENIPUNCTURE: CPT | Performed by: INTERNAL MEDICINE

## 2024-03-15 PROCEDURE — 82607 VITAMIN B-12: CPT | Performed by: INTERNAL MEDICINE

## 2024-03-15 PROCEDURE — 82747 ASSAY OF FOLIC ACID RBC: CPT | Performed by: INTERNAL MEDICINE

## 2024-03-15 PROCEDURE — 85025 COMPLETE CBC W/AUTO DIFF WBC: CPT | Performed by: INTERNAL MEDICINE

## 2024-03-15 PROCEDURE — 80053 COMPREHEN METABOLIC PANEL: CPT | Performed by: INTERNAL MEDICINE

## 2024-03-15 PROCEDURE — 84443 ASSAY THYROID STIM HORMONE: CPT | Performed by: INTERNAL MEDICINE

## 2024-03-15 PROCEDURE — 82525 ASSAY OF COPPER: CPT | Performed by: INTERNAL MEDICINE

## 2024-03-15 PROCEDURE — G0463 HOSPITAL OUTPT CLINIC VISIT: HCPCS | Performed by: INTERNAL MEDICINE

## 2024-03-15 PROCEDURE — 83036 HEMOGLOBIN GLYCOSYLATED A1C: CPT | Performed by: INTERNAL MEDICINE

## 2024-03-15 PROCEDURE — 85045 AUTOMATED RETICULOCYTE COUNT: CPT | Performed by: INTERNAL MEDICINE

## 2024-03-15 RX ORDER — LORAZEPAM 0.5 MG/1
0.5 TABLET ORAL
Qty: 2 TABLET | Refills: 0 | Status: SHIPPED | OUTPATIENT
Start: 2024-03-15 | End: 2024-03-22 | Stop reason: SINTOL

## 2024-03-15 RX ORDER — LORAZEPAM 0.5 MG/1
0.5 TABLET ORAL
Qty: 2 TABLET | Refills: 0 | Status: SHIPPED | OUTPATIENT
Start: 2024-03-15 | End: 2024-03-15

## 2024-03-15 ASSESSMENT — PAIN SCALES - GENERAL: PAINLEVEL: NO PAIN (0)

## 2024-03-15 NOTE — PROGRESS NOTES
received Cancer Risk Management Program referral, referred for:    mother breast cancer, father colon cancer, personal breast cancer      Reviewed for appropriate plan, and sent to New Patient Scheduling for completion.

## 2024-03-15 NOTE — NURSING NOTE
"Oncology Rooming Note    March 15, 2024 10:38 AM   Jinny Smith is a 64 year old female who presents for:    Chief Complaint   Patient presents with    Oncology Clinic Visit     Initial Vitals: BP (!) 182/100   Pulse 87   Resp 16   Ht 1.6 m (5' 3\")   Wt 76.3 kg (168 lb 3.2 oz)   SpO2 95%   BMI 29.80 kg/m   Estimated body mass index is 29.8 kg/m  as calculated from the following:    Height as of this encounter: 1.6 m (5' 3\").    Weight as of this encounter: 76.3 kg (168 lb 3.2 oz). Body surface area is 1.84 meters squared.  No Pain (0) Comment: Data Unavailable   No LMP recorded. Patient has had a hysterectomy.  Allergies reviewed: Yes  Medications reviewed: Yes    Medications: Medication refills not needed today.  Pharmacy name entered into [employer]:    [facility] [place] - [address] - [place], [place] - [address] [place] [address]    Frailty Screening:   Is the patient here for a new oncology consult visit in cancer care? 1. Yes. Over the past month, have you experienced difficulty or required a caregiver to assist with:   1. Balance, walking or general mobility (including any falls)? NO  2. Completion of self-care tasks such as bathing, dressing, toileting, grooming/hygiene?  NO  3. Concentration or memory that affects your daily life?  NO       Clinical concerns: Patient will discuss concerns with provider .      Sena Healy MA            "

## 2024-03-15 NOTE — LETTER
"    3/15/2024         RE: Jinny Smith  [address]  [facility] [address]        Dear Colleague,    Thank you for referring your patient, Jinny Smith, to the [facility]. Please see a copy of my visit note below.    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 Pt is s/p hysterectomy and possible BSO in 1980s for ovarian cysts and reported dysplasia ?location.     - 12/31/23 CT AP w/contrast for abd pain, N/V:   incidental finding \"breast mass in the left inferior breast at approximately the 6:00 position measuring 1.5 x 1.1 cm\"  - 1/31/24 Diagnostic bilateral breast mammogram:  Left breast, lower central posterior aspect, round mass and a few smaller adjacent round nodules. In the retro-areolar region, inferiorly, at or near skin, few benign appearing calcifications  - 1/31/24 Targeted LEFT breast ultrasound:  Left breast, In the 6:00 position 3 cm from the nipple there is a 12 x 11 x 19 mm oval heterogeneous mass that correlates with the largest mass seen on previous mammogram. The surrounding tissue is heterogeneous. Only one definitive adjacent nodule is seen.  This is approximately 4 mm away from the main mass and is bilobed and measures 9 x 4 x 5 mm.  The subtle nodular opacities seen about the dominant mass cover an area of " roughly 3.5 x 2.8 cm on the CC view. No other definable masses are seen.  - 2/13/24 Ultrasound-guided LEFT breast core needle biopsy of lesion at 6 oclock, 3 cm from nipple  PATHOLOGY  A. Left breast mass, ultrasound-guided core biopsy:   -Fragmented portions of carcinoma with focal solid papillary and mucinous features.  -Associated stroma with fibrosis, hemosiderin and acute hemorrhage suggesting the wall of a cyst.    -Surgical excision is needed for definitive diagnosis and subclassification (see Comment).  The neoplastic cells are strongly and diffusely positive for estrogen receptor (91 to 100%). The progesterone receptor is also positive (moderate to strong staining, approximately 75%). Immunohistochemistry for p63 shows scattered positive cells within both the solid and cribriform areas.       Comment    The findings in this biopsy confirm malignancy but the exact subclassification and the distinction between in situ and invasive carcinoma cannot be determined due to the nature of the tumor cells, the presence of a possible cyst wall, and the disrupted nature of the biopsy fragments.  The differential diagnosis includes solid papillary carcinoma with mucinous features and a combination of both solid papillary carcinoma and mucinous carcinoma.  Complete surgical excision is needed for definitive diagnosis.  The complex left breast ultrasound findings are also noted.     - 2/26/24 consult with Dr. Esquivel- recommended MRI breast to assess extent of disease and consider BCT, pt does not wish to do MRI, plan for mastectomy w/SLNBx and no reconstruction on 3/27/24    Lifetime estrogen exposure:  Menarche: 10  Last menstrual period: in her late 20s, 1980s  Age of first pregnancy: 17 y/o  Number of pregnancies: 1  Weight gain: 20lbs in last year  Exposure to exogenous estrogen: OCPs 2 years, estrogen after hysterectomy x10-15 years     Family history of:  1.  Breast cancer including male breast cancer: mother-  breast, lumpectomy in her early 70s, RT; paternal 1/2 aunt- breast cancer, details unknown   2. Ovarian cancer: negative  3.  Pancreatic cancer: negative  4.  Prostate cancer: negative  5. Diffuse gastric cancer (if lobular breast CA): negative  6. Uterine cancer: negative  7. Colon cancer: father- dx in his late 50s,  age 66     Patient denies the following:  History of DVT/PE/VTE for self or family   Cataracts  Severe arthralgias and myalgias  Cardiovascular risk factors including diabetes  Osteoporosis  Current use of antidepressants    Pt has htn, hld. Pt is s/p hysterectomy and possible BSO in  for ovarian cysts and reported dysplasia ?location.     Regarding adrenal nodule:  - 23 CT AP: Indeterminant 1.1 cm left adrenal nodule   - 24 CT Abdomen w/wo contrast: A small round previously seen left adrenal nodule  measures 10 mm and demonstrates an average density of 24 Hounsfield units on unenhanced images, 180 Hounsfield units on early postcontrast imaging, and 67 Hounsfield units on 15 minute delayed postcontrast imaging, which results in an absolute washout of 48.8% (absolute washout less than 60% is indeterminant) and a relative washout of 38% (relative washout less than 40% is indeterminate)  IMPRESSION:   1.  Technically indeterminate 10 mm left adrenal nodule. Recommend correlation with adrenal function studies. Consider follow-up renal MRI for further characterization if clinically indicated. Otherwise, consider six-month follow-up CT to assess for stability       Pt has alcohol a few times a week, prior to that 2 drinks, 4x/week, vodka.         REVIEW OF SYSTEMS:   A 14 point ROS was reviewed with pertinent positives and negatives in the HPI.        HOME MEDICATIONS:  Current Outpatient Medications   Medication Sig Dispense Refill     atorvastatin (LIPITOR) 20 MG tablet Take 1 tablet (20 mg) by mouth daily 90 tablet 3     lisinopril (ZESTRIL) 20 MG tablet Take 2 tablets (40 mg) by  mouth daily 180 tablet 2         ALLERGIES:  No Known Allergies      PAST MEDICAL HISTORY:  Past Medical History:   Diagnosis Date     Family history of colon cancer      Hyperlipidemia LDL goal <130 2012     Hyperplastic colonic polyp      Hypertension goal BP (blood pressure) < 140/80 2012     Knee joint effusion 2012         PAST SURGICAL HISTORY:  Past Surgical History:   Procedure Laterality Date     GYN SURGERY       HYSTERECTOMY TOTAL ABDOMINAL       LAPAROSCOPY DIAGNOSTIC (GENERAL) N/A 2024    Procedure: LAPAROSCOPY, DIAGNOSTIC, laparoscopic lysis of adhesions;  Surgeon: Philippe Esquivel MD;  Location: [facility] OR     LAPAROTOMY, LYSIS ADHESIONS, COMBINED N/A 2024    Procedure: LAPAROTOMY, EXPLORATORY, WITH open LYSIS OF ADHESIONS, reduction of internal hernia;  Surgeon: Philippe Esquivel MD;  Location: [facility] OR         SOCIAL HISTORY:  Social History     Socioeconomic History     Marital status:      Spouse name: Not on file     Number of children: Not on file     Years of education: Not on file     Highest education level: Not on file   Occupational History     Not on file   Tobacco Use     Smoking status: Former     Packs/day: .5     Types: Cigarettes     Quit date: 2023     Years since quittin.2     Smokeless tobacco: Former     Quit date: 2013   Vaping Use     Vaping Use: Never used   Substance and Sexual Activity     Alcohol use: Yes     Drug use: No     Sexual activity: Yes     Partners: Male   Other Topics Concern     Parent/sibling w/ CABG, MI or angioplasty before 65F 55M? Yes   Social History Narrative     Not on file     Social Determinants of Health     Financial Resource Strain: Low Risk  (2024)    Financial Resource Strain      Within the past 12 months, have you or your family members you live with been unable to get utilities (heat, electricity) when it was really needed?: No   Food Insecurity: Low Risk  (2024)    Food Insecurity      Within the  "past 12 months, did you worry that your food would run out before you got money to buy more?: No      Within the past 12 months, did the food you bought just not last and you didn't have money to get more?: No   Transportation Needs: Low Risk  (2/27/2024)    Transportation Needs      Within the past 12 months, has lack of transportation kept you from medical appointments, getting your medicines, non-medical meetings or appointments, work, or from getting things that you need?: No   Physical Activity: Unknown (2/27/2024)    Exercise Vital Sign      Days of Exercise per Week: 2 days      Minutes of Exercise per Session: Patient declined   Stress: No Stress Concern Present (2/27/2024)    [employer] - Occupational Stress Questionnaire      Feeling of Stress : Only a little   Social Connections: Unknown (2/27/2024)    Social Connection and Isolation Panel [NHANES]      Frequency of Communication with Friends and Family: Not on file      Frequency of Social Gatherings with Friends and Family: Once a week      Attends Synagogue Services: Not on file      Active Member of Clubs or Organizations: Not on file      Attends Club or Organization Meetings: Not on file      Marital Status: Not on file   Interpersonal Safety: Low Risk  (2/27/2024)    Interpersonal Safety      Do you feel physically and emotionally safe where you currently live?: Yes      Within the past 12 months, have you been hit, slapped, kicked or otherwise physically hurt by someone?: No      Within the past 12 months, have you been humiliated or emotionally abused in other ways by your partner or ex-partner?: No   Housing Stability: Low Risk  (2/27/2024)    Housing Stability      Do you have housing? : Yes      Are you worried about losing your housing?: No         FAMILY HISTORY:  No family history on file.      PHYSICAL EXAM:  Vital signs:  BP (!) 182/100   Pulse 87   Resp 16   Ht 1.6 m (5' 3\")   Wt 76.3 kg (168 lb 3.2 oz)  " " SpO2 95%   BMI 29.80 kg/m         ECOG:   GENERAL/CONSTITUTIONAL: No acute distress.  EYES: Pupils are equal and round. Extraocular movements intact without nystagmus.  No scleral icterus.  RESPIRATORY: Equal chest rise.   MUSCULOSKELETAL: Warm and well-perfused, no cyanosis, clubbing, or edema.   NEUROLOGIC: Cranial nerves are grossly intact. Alert, oriented to person, place and time, answers questions appropriately.  INTEGUMENTARY: No rashes or jaundice.  GAIT: Steady, does not use assistive device  BREAST: left breast 6 oclock, immediately inferior to areola- 3 cm tall and 4cm across ovoid mass with tail extending superior and lateral to areola. No axillary LN      LABS:   Latest Reference Range & Units 01/30/24 12:40   Sodium 135 - 145 mmol/L 143   Potassium 3.4 - 5.3 mmol/L 4.7   Chloride 98 - 107 mmol/L 104   Carbon Dioxide (CO2) 22 - 29 mmol/L 25   Urea Nitrogen 8.0 - 23.0 mg/dL 9.7   Creatinine 0.51 - 0.95 mg/dL 0.72   GFR Estimate >60 mL/min/1.73m2 >90   Calcium 8.8 - 10.2 mg/dL 10.0   Anion Gap 7 - 15 mmol/L 14   Cholesterol <200 mg/dL 243 (H)   Patient Fasting?  Yes   Glucose 70 - 99 mg/dL 116 (H)   HDL Cholesterol >=50 mg/dL 47 (L)   LDL Cholesterol Calculated <=100 mg/dL 154 (H)   Non HDL Cholesterol <130 mg/dL 196 (H)   Triglycerides <150 mg/dL 211 (H)   Hepatitis C Antibody Nonreactive  Nonreactive   HIV Antigen Antibody Combo Nonreactive  Nonreactive   (H): Data is abnormally high  (L): Data is abnormally low      PATHOLOGY:      IMAGING:      ASSESSMENT/PLAN:  Jinny Smith is a 64 year old female with:    # LEFT breast carcinoma, ER positive, VT positive   - 12/31/23 CT AP w/contrast for abd pain, N/V:  incidental finding \"breast mass in the left inferior breast at approximately the 6:00 position measuring 1.5 x 1.1 cm\"  - 1/31/24 Diagnostic bilateral breast mammogram, targeted left breast US:  Left breast, lower central posterior aspect, 6 oclock position, 3 cm from nipple, 1.2x1.1x1.9cm " heterogenous round/oval mass and a few smaller adjacent round nodules- on US one seen 0.4cm away from main mass, 0.9x0.4x0.5cm. The subtle nodular opacities seen about the dominant mass cover an area of roughly 3.5 x 2.8 cm on the CC view. In the retro-areolar region, inferiorly, at or near skin, few benign appearing calcifications. No comment on LN  - 2/13/24 Ultrasound-guided LEFT breast core needle biopsy of lesion at 6 oclock, 3 cm from nipple, PATHOLOGY: Fragmented portions of carcinoma with focal solid papillary and mucinous features. Associated stroma with fibrosis, hemosiderin and acute hemorrhage suggesting the wall of a cyst.  ER (%, strong), MN (75%, moderate to strong). The findings in this biopsy confirm malignancy but the exact subclassification and the distinction between in situ and invasive carcinoma cannot be determined due to the nature of the tumor cells, the presence of a possible cyst wall, and the disrupted nature of the biopsy fragments.  The differential diagnosis includes solid papillary carcinoma with mucinous features and a combination of both solid papillary carcinoma and mucinous carcinoma.   - 2/26/24 consult with Dr. Esquivel- recommended MRI breast to assess extent of disease and consider BCT, pt does not wish to do MRI, plan for mastectomy w/SLNBx and no reconstruction on 3/27/24    - 1/24 CBC , CMP WNL, lipid panel: total cholesterol 243, , , HDL 47    PLAN:  - 3/272/4 pending mastectomy w/SLNBx  - we re-discussed utility of breast MRI today, she is agreeable to attempting it with premedication prior (ativan) to see if she can tolerate it. I have reached out to Dr. Esquivel regarding this as it may affect the surgical plan.   - if pt has invasive disease, needs her2 testing  - if invasive disease that is ER positive, her2 negative and 0-3 positive LN, will send oncotype  - depending on surgical pathology, pt may need adjuvant radiation  - regardless of the  above, pt will need at least 5 years of adjuvant endocrine therapy. Will discuss details at next visit per pt preference   - of note, pt has htn, hld, s/p hysterectomy + BSO in 1990s for dysplasia   - at time of next visit, check CBC, CMP, hgbA1c  - start calcium and vitamin D OTC  - check baseline DEXA    #macrocytosis w/o anemia  - 1/24 CBC hgb 11.4->12.1 w/-102    PLAN:  - check CBC, B12, RBC folate, copper, zinc, absolute retic, TSH, peripheral smear    #hld  - on atorvastatin     #left adrenal nodule  - 1/24 CT A w/o contrast: Technically indeterminate 10 mm left adrenal nodule. Recommend correlation with adrenal function studies. Consider follow-up renal  MRI for further characterization if clinically indicated. Otherwise,  consider six-month follow-up CT to assess for stability.  - pt does not want to do MRI, planning on repeat CT w/PCP 7/24    #family hx of cancer  - mother- breast cancer, paternal 1/2 aunt- breast cancer, details unknown; father colon cancer in his 50s  - genetic counseling referral       RTC 4/16 for follow up with me (after DEXA)- in person, Alexis Alvarez DO  Hematology/Oncology  [facility]      Again, thank you for allowing me to participate in the care of your patient.        Sincerely,        XAVIER ALVAREZ DO

## 2024-03-15 NOTE — PROGRESS NOTES
Radiology Note:    Notified by MRI technologist that patient experienced skin changes/rash following a single dose of oral Ativan for sedation prior to scheduled breast MRI.  I evaluated patient.  No acute distress and breathing comfortably on room air.  She had mild flushing of the anterior lower neck and faintly on upper chest.  Negative for hives.  Patient advised to avoid any additional dose of Ativan and allergy list will be updated to reflect rash/flushing following a single dose of Ativan.  No allergy listed in chart prior to this episode.  Patient agreeable to continue with breast MRI however declined imaging after positioning on the table was initiated.  No contrast was administered, no images were obtained and exam was cancelled.  Ordering provider notified by MRI technologist.  Patient left the department at baseline.  No medication was administered for limited and stable-resolving neck/upper chest flushing.    Janette Trinh MD  Breast Radiologist

## 2024-03-16 LAB
COPPER SERPL-MCNC: 129 UG/DL
FOLATE RBC-MCNC: 705 NG/ML
HCT VFR BLD CALC: NORMAL %
VIT B12 SERPL-MCNC: 548 PG/ML (ref 232–1245)

## 2024-03-18 ENCOUNTER — VIRTUAL VISIT (OUTPATIENT)
Dept: ONCOLOGY | Facility: CLINIC | Age: 64
End: 2024-03-18
Attending: GENETIC COUNSELOR, MS
Payer: MEDICARE

## 2024-03-18 DIAGNOSIS — C50.112 MALIGNANT NEOPLASM OF CENTRAL PORTION OF LEFT FEMALE BREAST, UNSPECIFIED ESTROGEN RECEPTOR STATUS (H): Primary | ICD-10-CM

## 2024-03-18 DIAGNOSIS — Z80.3 FAMILY HISTORY OF MALIGNANT NEOPLASM OF BREAST: ICD-10-CM

## 2024-03-18 DIAGNOSIS — Z80.0 FAMILY HISTORY OF MALIGNANT NEOPLASM OF COLON: ICD-10-CM

## 2024-03-18 LAB — ZINC SERPL-MCNC: 71.2 UG/DL

## 2024-03-18 PROCEDURE — 96040 HC GENETIC COUNSELING, EACH 30 MINUTES: CPT | Mod: 95 | Performed by: GENETIC COUNSELOR, MS

## 2024-03-18 NOTE — Clinical Note
3/18/2024         RE: Jinny Smith  [address]  [facility] [address]        Dear Colleague,    Thank you for referring your patient, Jinny Smith, to the [facility]. Please see a copy of my visit note below.    3/18/2024    Referring Provider: ***    Presenting Information:   I met with Jinny for her video genetic counseling visit, through the Cancer Risk Management Program, to discuss her personal *** and family history of *** cancer. Today we reviewed this history, cancer screening recommendations, and available genetic testing options.    Personal History:  Jinny is a 64 year old year old female. She was diagnosed with ***; treatment included ***  / does not have any personal history of cancer.    ***She had her first menstrual period at age ***, her first child at age ***, and is ***.  ***Jinny has her ovaries, fallopian tubes and uterus in place, and she has had *** ovarian cancer screening to date. She reports that she *** hormone replacement therapy.      She has *** clinical breast exams and mammograms; her most recent mammogram in *** was ***. ***Jinny began having colonoscopies at the age of ***/Jinny has not had a colonoscopy. Her most recent colonoscopy in *** was *** and follow-up was recommended in ***. ***She does not regularly do any other cancer screening at this time. Jinny reported *** tobacco use and *** alcohol use.    Family History: (Please see scanned pedigree for detailed family history information)  ***  ***  Her maternal ethnicity is ***. Her paternal ethnicity is ***. There is no known Ashkenazi Quaker ancestry on either side of her family. There is no reported consanguinity.    Discussion:  Jinny's personal and family history of *** is suggestive of a hereditary cancer syndrome.  We reviewed the features of sporadic, familial, and hereditary cancers. ***  We discussed the natural history and genetics of hereditary cancer. A  detailed handout regarding hereditary cancer, along with the other information we discussed, will be mailed to Jinny at the end of our appointment today and can be found in the after visit summary. Topics included: inheritance pattern, cancer risks, cancer screening recommendations, and also risks, benefits and limitations of testing.  Based on her personal and family history, Jinny meets current National Comprehensive Cancer Network (NCCN) criteria for genetic testing of ***.  We discussed that there are additional genes that could cause increased risk for *** cancer. As many of these genes present with overlapping features in a family and accurate cancer risk cannot always be established based upon the pedigree analysis alone, it would be reasonable for Jinny to consider panel genetic testing to analyze multiple genes at once.  ***  Jinny stated that she would prefer to submit a saliva kit for her genetic testing. ***Lisa will send a kit directly to her home with directions on how to collect a saliva sample. We discussed that there is a small chance for sample failure due to contamination of the sample. To help minimize this, she should follow the directions that are sent with the kit. Jinny verbalized understanding of this. Once the sample is collected, she will send it to ***[email or web address] using the return envelope and prepaid shipping label.   Verbal consent was given over video*** and written on the consent form. Turnaround time is approximately 4 weeks once the lab receives the sample.  ***  Medical Management: For Jinny, we reviewed that the information from genetic testing may determine:  ***surgery to treat Jinny's active cancer diagnosis (i.e. lumpectomy versus bilateral mastectomy, partial versus total colectomy, etc.***),  additional cancer screening for which Jinny may qualify (i.e. mammogram and breast MRI, more frequent colonoscopies, more frequent dermatologic exams, etc.***),  options  for risk reducing surgeries Jinny could consider (i.e. bilateral mastectomy, surgery to remove her ovaries and/or uterus, etc.***),    and targeted chemotherapies for Jinny's *** active cancer, or ***if she were to develop certain cancers in the future (i.e. immunotherapy for individuals with Calvo syndrome, PARP inhibitors, etc.***).   These recommendations and possible targeted chemotherapies will be discussed in detail once genetic testing is completed.     Plan:  1) Today Jinny elected to proceed with *** testing with automatic reflex to ***.  2) A copy of the consent form and the after visit summary will be sent to Jinny.  3) This information should be available in approximately 4 weeks, once the lab receives the sample.  4) I will call Jinny with the results once they become available.    Time spent on video: *** minutes    Darron Serra MS, [facility]  Licensed, Certified Genetic Counselor    ***    Virtual Visit Details    Type of service:  Telephone Visit       Again, thank you for allowing me to participate in the care of your patient.        Sincerely,        Darron Serra GC

## 2024-03-18 NOTE — LETTER
2024    Jinnybasil Smith  [id number] [address] [id number]      Dear Jinny,    It was a pleasure speaking with you on the phone on 3/18/2024. Here is a copy of the progress note from our discussion. If you have any additional questions, please feel free to call.    Referring Provider: Sweta Brand DO    Presenting Information:   I met with Jinny for her telephone genetic counseling visit, through the [facility], to discuss her personal history of breast cancer and family history of breast and colon cancer. Today we reviewed this history, cancer screening recommendations, and available genetic testing options.    Personal History:  Jinny is a 64 year old year old female. 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    She had her first menstrual period at age 10, her first child at age 18, and completed menopause in her late 20's. Jinny had her ovaries, fallopian tubes and uterus removed in her late 20's due to her history of ovarian cysts and dysplasia. She reports that she used estrogen only hormone replacement therapy for 10-15 years.      Her most recent mammogram in 2024 found an oval, heterogenous mass that turned out to be cancer. Her most recent colonoscopy in 2013 found two 4-5mm hyperplastic polyps and follow-up was recommended in 10 years. She does not regularly do any other cancer screening at this time.     Family History: (Please see scanned pedigree for detailed family history information)  Jinny's brother  from an unknown cancer at age 5.  Jinny's mother was diagnosed with breast cancer in her late 60's and  at age 75.  Jinny's father was diagnosed with colon cancer in his mid-50's and  at age 66.   There is no other reported family  history of cancer on either side of her family, however, Jinny had very limited information about her extended relatives.  Her maternal and paternal ethnicity is White. There is no known Ashkenazi Zoroastrianism ancestry on either side of her family. There is no reported consanguinity.    Discussion:  Jinny's personal and family history of breast and colon cancer is suggestive of a hereditary cancer syndrome.  We reviewed the features of sporadic, familial, and hereditary cancers. We discussed that mutations in either BRCA1 or BRCA2 could be possible hereditary explanations for her family history of cancer. Mutations in the BRCA1 or BRCA2 gene are known to cause Hereditary Breast and Ovarian Cancer Syndrome (HBOC). HBOC typically presents with multiple family members diagnosed with breast cancer before age 50 and/or ovarian cancer. Other cancer risks associated with HBOC include male breast cancer, prostate cancer, pancreatic cancer, and melanoma.   We discussed the natural history and genetics of hereditary cancer. A detailed handout regarding hereditary cancer, along with the other information we discussed, will be mailed to Jinny at the end of our appointment today and can be found in the after visit summary. Topics included: inheritance pattern, cancer risks, cancer screening recommendations, and also risks, benefits and limitations of testing.  Based on her personal and family history, Jinny does not meet current National Comprehensive Cancer Network (NCCN) criteria for genetic testing of BRCA1/2.  However, according to the American Society of Breast Surgeons Consensus Guideline on Genetic Testing for Hereditary Breast Cancer, genetic testing should be available to all patients who have been diagnosed with breast cancer.  The guidelines state that testing should include BRCA1, BRCA2, and PALB2, and that additional testing may be warranted based on personal and/or family history.   We discussed that there are  additional genes that could cause increased risk for breast and/or colon cancer. As many of these genes present with overlapping features in a family and accurate cancer risk cannot always be established based upon the pedigree analysis alone, it would be reasonable for Jinny to consider panel genetic testing to analyze multiple genes at once.  Jinny was overwhelmed by all of the appointments and information that she has received over the past few days. As such, she wanted some to think about this and to get through her surgery prior to deciding about genetic testing. She will contact me sometime after her surgery should she be interested in further conversation about genetic testing for her.  Medical Management: For Jinny, we reviewed that the information from genetic testing may determine:  surgery to treat Jinny's active cancer diagnosis (i.e. lumpectomy versus bilateral mastectomy),  additional cancer screening for which Jinny may qualify (i.e. mammogram and breast MRI, more frequent colonoscopies, more frequent dermatologic exams, etc.),  options for risk reducing surgeries Jinny could consider (i.e. bilateral mastectomy),    and targeted chemotherapies for Jinny's active cancer, if needed, or if she were to develop certain cancers in the future (i.e. immunotherapy for individuals with Calvo syndrome, PARP inhibitors, etc.).   These recommendations and possible targeted chemotherapies will be discussed in detail once genetic testing is completed.     Plan:  1) Today Jinny elected to hold off on genetic testing until after surgery.  2) A copy of the after visit summary will be sent to Jinny.  3) Jinny will contact me should she decide to do genetic testing and to discuss the logistics and billing for that testing further.    Time spent on the phone: 26 minutes    Darron Serra MS, [facility] [place]  Licensed, Certified Genetic Counselor

## 2024-03-18 NOTE — PROGRESS NOTES
3/18/2024    Referring Provider: Sweta Brand DO    Presenting Information:   I met with Jinny for her telephone genetic counseling visit, through the Cancer Risk Management Program, to discuss her personal history of breast cancer and family history of breast and colon cancer. Today we reviewed this history, cancer screening recommendations, and available genetic testing options.    Personal History:  Jinny is a 64 year old year old female. 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    She had her first menstrual period at age 10, her first child at age 18, and completed menopause in her late 20's. Jinny had her ovaries, fallopian tubes and uterus removed in her late 's due to her history of ovarian cysts and dysplasia. She reports that she used estrogen only hormone replacement therapy for 10-15 years.      Her most recent mammogram in 2024 found an oval, heterogenous mass that turned out to be cancer. Her most recent colonoscopy in 2013 found two 4-5mm hyperplastic polyps and follow-up was recommended in 10 years. She does not regularly do any other cancer screening at this time.     Family History: (Please see scanned pedigree for detailed family history information)  Jinny's brother  from an unknown cancer at age 5.  Jinny's mother was diagnosed with breast cancer in her late 60's and  at age 75.  Jinny's father was diagnosed with colon cancer in his mid-50's and  at age 66.   There is no other reported family history of cancer on either side of her family, however, Jinny had very limited information about her extended relatives.  Her maternal and paternal ethnicity is White. There is no known Ashkenazi Tenriism ancestry on either side of her family. There is no reported  consanguinity.    Discussion:  Jinny's personal and family history of breast and colon cancer is suggestive of a hereditary cancer syndrome.  We reviewed the features of sporadic, familial, and hereditary cancers. We discussed that mutations in either BRCA1 or BRCA2 could be possible hereditary explanations for her family history of cancer. Mutations in the BRCA1 or BRCA2 gene are known to cause Hereditary Breast and Ovarian Cancer Syndrome (HBOC). HBOC typically presents with multiple family members diagnosed with breast cancer before age 50 and/or ovarian cancer. Other cancer risks associated with HBOC include male breast cancer, prostate cancer, pancreatic cancer, and melanoma.   We discussed the natural history and genetics of hereditary cancer. A detailed handout regarding hereditary cancer, along with the other information we discussed, will be mailed to Jinny at the end of our appointment today and can be found in the after visit summary. Topics included: inheritance pattern, cancer risks, cancer screening recommendations, and also risks, benefits and limitations of testing.  Based on her personal and family history, Jinny does not meet current National Comprehensive Cancer Network (NCCN) criteria for genetic testing of BRCA1/2.  However, according to the American Society of Breast Surgeons Consensus Guideline on Genetic Testing for Hereditary Breast Cancer, genetic testing should be available to all patients who have been diagnosed with breast cancer.  The guidelines state that testing should include BRCA1, BRCA2, and PALB2, and that additional testing may be warranted based on personal and/or family history.   We discussed that there are additional genes that could cause increased risk for breast and/or colon cancer. As many of these genes present with overlapping features in a family and accurate cancer risk cannot always be established based upon the pedigree analysis alone, it would be reasonable  for Jinny to consider panel genetic testing to analyze multiple genes at once.  Jinny was overwhelmed by all of the appointments and information that she has received over the past few days. As such, she wanted some to think about this and to get through her surgery prior to deciding about genetic testing. She will contact me sometime after her surgery should she be interested in further conversation about genetic testing for her.  Medical Management: For Jinny, we reviewed that the information from genetic testing may determine:  surgery to treat Jinny's active cancer diagnosis (i.e. lumpectomy versus bilateral mastectomy),  additional cancer screening for which Jinny may qualify (i.e. mammogram and breast MRI, more frequent colonoscopies, more frequent dermatologic exams, etc.),  options for risk reducing surgeries Jinny could consider (i.e. bilateral mastectomy),    and targeted chemotherapies for Jinny's active cancer, if needed, or if she were to develop certain cancers in the future (i.e. immunotherapy for individuals with Calvo syndrome, PARP inhibitors, etc.).   These recommendations and possible targeted chemotherapies will be discussed in detail once genetic testing is completed.     Plan:  1) Today Jinny elected to hold off on genetic testing until after surgery.  2) A copy of the after visit summary will be sent to Jinny.  3) Jinny will contact me should she decide to do genetic testing and to discuss the logistics and billing for that testing further.    Time spent on the phone: 26 minutes    Darron Serra MS, [facility] [place]  Licensed, Certified Genetic Counselor      Virtual Visit Details    Type of service:  Telephone Visit

## 2024-03-18 NOTE — NURSING NOTE
Is the patient currently in the state of [place]? YES    Visit mode:TELEPHONE    If the visit is dropped, the patient can be reconnected by: TELEPHONE VISIT: Phone number:   Telephone Information:   Mobile [phone]       Will anyone else be joining the visit? NO  (If patient encounters technical issues they should call [phone]    How would you like to obtain your AVS? MyChart    Are changes needed to the allergy or medication list? N/A    Reason for visit: Consult    Anai BANUELOS

## 2024-03-19 NOTE — PATIENT INSTRUCTIONS
Assessing Cancer Risk  Cancer is a common diagnosis which impacts many families.  Individuals may develop cancer due to environmental factors (such as exposures and lifestyle), aging, genetic predisposition, or a combination of these factors.      Only about 5-10% of cancers are thought to be due to an inherited cancer susceptibility gene.    These families often have:  Several people with the same or related types of cancer  Cancers diagnosed at a young age (before age 50)  Individuals with more than one primary cancer  Multiple generations of the family affected with cancer    Comprehensive Breast and Gynecologic Cancer Panel  We each inherit two copies of every gene in our bodies: one from our mother, and one from our father. Each gene has a specific job to do.  When a gene has a mistake or  mutation  in it, it does not work like it should.     Some people may be candidates for genetic testing of more than one gene.  For these families, genetic testing using a cancer panel may be offered. These panels will test different genes at once known to increase the risk for breast, ovarian, uterine, and/or other cancers.    This handout will review common hereditary breast and gynecologic cancer syndromes. The genes that will be discussed in this handout are: JOEL, BRCA1, BRCA2, BRIP1, CDH1, CHEK2, MLH1, MSH2, MSH6, PMS2, EPCAM, PTEN, PALB2, RAD51C, RAD51D, and TP53.    The purpose of this handout is to serve as a brief summary of the breast and gynecologic cancer risk genes that have published clinical management guidelines for individuals who are found to carry a mutation. Inheriting a mutation does not mean a person will develop cancer, but it does significantly increase their risk above the general population risk.     ______________________________________________________________________________    Hereditary Breast and Ovarian Cancer Syndrome (BRCA1 and BRCA2)  A single mutation in one of the copies of BRCA1 or  BRCA2 increases the risk for breast and ovarian cancer, among others.  The risk for pancreatic cancer and melanoma may also be slightly increased in some families.  The chart below shows the chance that someone with a BRCA mutation would develop cancer in his or her lifetime1,2,3,4.       Lifetime Cancer Risks    General Population BRCA1  BRCA2   Breast  12% >60% >60%   Ovarian  1-2% 39-58% 13-29%   Prostate 12% 7-26% 19-61%   Male Breast 0.1% 0.2-1.2% 1.8-7.1%   Pancreas 1-2% Up to 5% 5-10%     A person s ethnic background is also important to consider, as individuals of Ashkenazi Quaker ancestry have a higher chance of having a BRCA gene mutation.  There are three BRCA mutations that occur more frequently in this population.      Calvo Syndrome (MLH1, MSH2, MSH6, PMS2, and EPCAM)  Currently five genes are known to cause Calvo Syndrome: MLH1, MSH2, MSH6, PMS2, and EPCAM.  A single mutation in one of the Calvo Syndrome genes increases the risk for colon, endometrial, ovarian, and stomach cancers.  Other cancers that occur less commonly in Calvo Syndrome include urinary tract, skin, and brain cancers.  The chart below shows the chance that a person with Calvo syndrome would develop cancer in his or her lifetime5.      Lifetime Cancer Risks    General Population Calvo Syndrome   Colon 5% 10-61%   Endometrial 3% 13-57%   Ovarian 1-2% 1-38%   Stomach <1% 1-9%   *Cancer risk varies depending on Calvo syndrome gene found      Cowden Syndrome (PTEN)  Cowden syndrome is a hereditary condition that increases the risk for breast, thyroid, endometrial, colon, and kidney cancer.  Cowden syndrome is caused by a mutation in the PTEN gene.  A single mutation in one of the copies of PTEN causes Cowden syndrome and increases cancer risk.  The chart below shows the chance that someone with a PTEN mutation would develop cancer in their lifetime6,7.  Other benign features seen in some individuals with Cowden syndrome include benign  skin lesions (facial papules, keratoses, lipomas), learning disability, autism, thyroid nodules, colon polyps, and larger head size.     Lifetime Cancer Risks    General Population Cowden   Breast 12% 40-60%*   Thyroid 1% Up to 38%   Renal 1-2% Up to 35%   Endometrial 3% Up to 28%   Colon 5% Up to 9%   Melanoma 2-3% Up to 6%   *Emerging data suggests the risk for breast cancer could be greater than 60%               Li-Fraumeni Syndrome (TP53)  Li-Fraumeni Syndrome (LFS) is a cancer predisposition syndrome caused by a mutation in the TP53 gene. A single mutation in one of the copies of TP53 increases the risk for multiple cancers. Individuals with LFS are at an increased risk for developing cancer at a young age. The lifetime risk for development of a LFS-associated cancer is 50% by age 30 and 90% by age 60.   Core Cancers: Sarcomas, Breast, Brain, Lung, Leukemias/Lymphomas, Adrenocortical carcinomas  Other Cancers: Gastrointestinal, Thyroid, Skin, Genitourinary       Hereditary Diffuse Gastric Cancer (CDH1)  Currently, one gene is known to cause hereditary diffuse gastric cancer (HDGC): CDH1.  Individuals with HDGC are at increased risk for diffuse gastric cancer and lobular breast cancer. Of people diagnosed with HDGC, 30-50% have a mutation in the CDH1 gene.  This suggests there are likely other genes that may cause HDGC that have not been identified yet.      Lifetime Cancer Risks    General Population HDGC   Diffuse Gastric  <1% ~80%   Breast 12% 41-60%       Additional Genes    JOEL  JOEL is a moderate-risk breast cancer gene. Women who have a mutation in JOEL can have between a 2-4 fold increased risk for breast cancer compared to the general population8. JOEL mutations have also been associated with increased risk for pancreatic cancer between 5-10%9. Individuals who inherit two JOEL mutations have a condition called ataxia-telangiectasia (AT).  This rare autosomal recessive condition affects the nervous system  and immune system, and is associated with progressive cerebellar ataxia beginning in childhood. Individuals with ataxia-telangiectasia often have a weakened immune system and have an increased risk for childhood cancers.    PALB2  Mutations in PALB2 have been shown to increase the risk of breast cancer up to 41-60% in some families; where individuals fall within this risk range is dependent upon family [id number]. PALB2 mutations have also been associated with increased risk for pancreatic cancer between 5-10%.  Individuals who inherit two PALB2 mutations--one from their mother and one from their father--have a condition called Fanconi Anemia.  This rare autosomal recessive condition is associated with short stature, developmental delay, bone marrow failure, and increased risk for childhood cancers.    CHEK2   CHEK2 is a moderate-risk breast cancer gene.  Women who have a mutation in CHEK2 have around a 2-4 fold increased risk for breast cancer compared to the general population, and this risk may be higher depending upon family history.11,12,13 The risk of colon cancer may be twice as high as the general population risk of colon cancer of 5%. Mutations in CHEK2 have also been shown to increase the risk of other cancers, including prostate, however these cancer risks are currently not well understood.    BRIP1, RAD51C and RAD51D  Mutations in RAD51C and RAD51D have been shown to increase the risk of ovarian cancer and breast cancer 14,. Mutations in BRIP1 have been shown to increase the risk of ovarian cancer and possibly female breast cancer 15 .       Lifetime Cancer Risk    General Population        BRIP1   RAD51C  RAD51D   Breast 12% Not well defined 20-40% 20-40%   Ovarian 1-2% 5-15% 10-15% 10-20%     ______________________________________________________________  Inheritance  All of the cancer syndromes reviewed above are inherited in an autosomal dominant pattern.  This means that if a parent has a mutation,  each of their children will have a 50% chance of inheriting that same mutation. Therefore, each child --male or female-- would have a 50% chance of being at increased risk for developing cancer.    Image obtained from Genetics Home Reference, 2013     Mutations in some genes can occur de rosie, which means that a person s mutation occurred for the first time in them and was not inherited from a parent.  Now that they have the mutation, however, it can be passed on to future generations.    Genetic Testing  Genetic testing involves a blood test and will look for any harmful mutations that are associated with increased cancer risk.  If possible, it is recommended that the person(s) who has had cancer be tested before other family members.  That person will give us the most useful information about whether or not a specific gene is associated with the cancer in the family.    Results  There are three possible results of genetic testing:  Positive--a harmful mutation was identified in one or more of the genes  Negative--no mutations were identified in any of the genes tested  Variant of unknown significance--a variation in one of the genes was identified, but it is unclear how this impacts cancer risk in the family    Advantages and Disadvantages   There are advantages and disadvantages to genetic testing.    Advantages  May clarify your cancer risk  Can help you make medical decisions  May explain the cancers in your family  May give useful information to your family members (if you share your results)    Disadvantages  Possible negative emotional impact of learning about inherited cancer risk  Uncertainty in interpreting a negative test result in some situations  Possible genetic discrimination concerns (see below)    Genetic Information Nondiscrimination Act (ADELINA)  The Genetic Information Nondiscrimination Act of 2008 (ADELINA) is a federal law that protects individuals from health insurance or employment discrimination  based on a genetic test result alone (with some exceptions, including employers with fewer than 15 employees, and ).  Although rare, ADELINA  does not cover discrimination protections in terms of life insurance, long term care, or disability insurances.  Visit the National Human Last 2 Left Research Bryan website to learn more.    Reducing Cancer Risk  All of the genes described in this handout have nationally recognized cancer screening guidelines that would be recommended for individuals who test positive.  In addition to increased cancer screening, surgeries may be offered or recommended to reduce cancer risk.  Recommendations are based upon an individual s genetic test result as well as their personal and family history of cancer.    Questions to Think About Regarding Genetic Testing:  What effect will the test result have on me and my relationship with my family members if I have an inherited gene mutation?  If I don t have a gene mutation?  Should I share my test results, and how will my family react to this news, which may also affect them?  Are my children ready to learn new information that may one day affect their own health?    Hereditary Cancer Resources    FORCE: Facing Our Risk of Cancer Empowered facingourrisk.org   Bright Pink bebrightpink.org   Li-Fraumeni Syndrome Association lfsassociation.org   PTEN World PTENworld.com   No stomach for cancer, Inc. nostomachforcancer.org   Stomach cancer relief network Scrnet.org   Collaborative Group of the Americas on Inherited Colorectal Cancer (CGA) cgaicc.com    Cancer Care cancercare.org   American Cancer Society (ACS) cancer.org   National Cancer Bryan (NCI) cancer.gov     Please call us if you have any questions or concerns.   Cancer Risk Management Program [phone]-CANCER ([phone])  Darron Serra, [place] [facility] [place]  [phone]  Lanny Johnson, MS, [facility] [place] [phone]  Vania Cesar, MS, [facility] [place]  [phone]  Lora Christie, MS, [facility] [place]  [phone]  Maris Kinney,  [place], [facility] [place]  [phone]  Marylou Steve, MS, [facility] [place] [phone]  Bonnie Gao, MS, [facility] [place] [phone]    References  Rajani Wooten PDP, Chris S, Nikolay JETER, Giuseppe JE, Abiodun JL, Leon N, Gio H, Brionna O, Nicolás A, Pasini B, Radilanny P, Manhannah S, Sundeep DM, Sloan N, Pamella E, Lety H, Valencia E, Massiel J, Gronallyson J, Lorena B, Tulinius H, Thorlacius S, Eerola H, Nevanlinna H, Imer K, Maggie OP. Average risks of breast and ovarian cancer associated with BRCA1 or BRCA2 mutations detected in case series unselected for family history: a combined analysis of 222 studies. Am J Hum Flori. 2003;72:1117-30.  Tanya N, Dorita M, Kt G.  BRCA1 and BRCA2 Hereditary Breast and Ovarian Cancer. Gene Reviews online. 2013.  Rajendra YC, Delmer S, Shreyl G, Slater S. Breast cancer risk among male BRCA1 and BRCA2 mutation carriers. J Natl Cancer Inst. 2007;99:1811-4.  Mick STEPHENS, Jessee I, Samuel J, Yanelis E, Rober ER, Yesenia F. Risk of breast cancer in male BRCA2 carriers. J Med Flori. 2010;47:710-1.  National Comprehensive Cancer Network. Clinical practice guidelines in oncology, colorectal cancer screening. Available online (registration required). 2015.  Joel MH, Maryan J, Erica J, John FRANK, Luis A MS, Eng C. Lifetime cancer risks in individuals with germline PTEN mutations. Clin Cancer Res. 2012;18:400-7.  Ellie R. Cowden Syndrome: A Critical Review of the Clinical Literature. J Flori . 2009:18:13-27.  Tim BARCENAS, Arian GALAN, Ari S, Nighat P, Malu T, Jesús M, Pradeep B, Elo H, Ольга R, Alisa K, Carlitos L, Mick STEPHENS, Sundeep GALAN, Jesse DF, Angie MR, The Breast Cancer Susceptibility Collaboration (UK) & Chidi GAO. JOEL mutations that cause ataxia-telangiectasia are breast cancer susceptibility alleles. Nature Genetics. 2006;38:873-875  Hesham N , Jaimee Y, Shruthi J, Chico L, Cooper DAY , Amber ML, Chiki S, Owen AG, Jannet S, Abbi ML, Hua J , Yahir R, Joel STACY, Micheline  JR, Heidi VE, Jackson M, Vonieshastein B, Lisset N, Sarah RH, Eduardo KW, and Sea AP. JOEL mutations in patients with hereditary pancreatic cancer. Cancer Discover. 2012;2:41-46  Julia ROTHMAN., et al. Breast-Cancer Risk in Families with Mutations in PALB2. NEJM. 2014; 371(6):497-506.  CHEK2 Breast Cancer Case-Control Consortium. CHEK2*1100delC and susceptibility to breast cancer: A collaborative analysis involving 10,860 breast cancer cases and 9,065 controls from 10 studies. Am J Hum Flori, 74 (2004), pp. 0985-3499  Vincent T, Eileen S, Mabel K, et al. Spectrum of Mutations in BRCA1, BRCA2, CHEK2, and TP53 in Families at High Risk of Breast Cancer. YONAS. 2006;295(12):8677-2583.   Debora C, Albert D, Aron BARCENAS, et al. Risk of breast cancer in women with a CHEK2 mutation with and without a family history of breast cancer. J Clin Oncol. 2011;29:5911-0679.  Song H, Cullens E, Ramus SJ, et al. Contribution of germline mutations in the RAD51B, RAD51C, and RAD51D genes to ovarian cancer in the population. J Clin Oncol. 2015;33(26):6595-6495. Doi:10.1200/JCO.2015.61.2408.  Sherman T, Keturah DF, Balbina P, et al. Mutations in BRIP1 confer high risk of ovarian cancer. Yadi Flori. 2011;43(11):9480-3027. doi:10.1038/ng.955.

## 2024-03-19 NOTE — TELEPHONE ENCOUNTER
----- Message from Josiane Galvez NP sent at 3/18/2024  5:27 PM CDT -----  Please have patient schedule nurse BP check when able.   ----- Message -----  From: Sena Healy MA  Sent: 3/15/2024   2:01 PM CDT  To: Josiane Galvez NP    Patient was seen in our office today [facility].Dr. Brand wants to inform PCP that Jinny blood pressure today was 182/100. If any question or concerns please don't hesitate to call or send a message.    Thanks,    Sena CRISTOBAL MA

## 2024-03-21 ENCOUNTER — ALLIED HEALTH/NURSE VISIT (OUTPATIENT)
Dept: FAMILY MEDICINE | Facility: CLINIC | Age: 64
End: 2024-03-21
Payer: MEDICARE

## 2024-03-21 ENCOUNTER — TELEPHONE (OUTPATIENT)
Dept: SURGERY | Facility: CLINIC | Age: 64
End: 2024-03-21

## 2024-03-21 VITALS — SYSTOLIC BLOOD PRESSURE: 160 MMHG | DIASTOLIC BLOOD PRESSURE: 90 MMHG

## 2024-03-21 DIAGNOSIS — I10 HYPERTENSION GOAL BP (BLOOD PRESSURE) < 140/80: Primary | ICD-10-CM

## 2024-03-21 PROCEDURE — 99207 PR NO CHARGE NURSE ONLY: CPT

## 2024-03-21 NOTE — TELEPHONE ENCOUNTER
"Reason for Call:  Other call back    Detailed comments: Jinny is scheduled to have a mastectomy next week. After speaking with her oncologist, she would instead like to \"do a lumpectomy and go from there.\"     Jinny understands based on the time of day that she might not get a call back until tomorrow and is fine with this. Thank you!    Phone Number Patient can be reached at: Cell number on file:    Telephone Information:   Mobile [phone]       Best Time: any    Can we leave a detailed message on this number? YES    Call taken on 3/21/2024 at 4:19 PM by Baldo Cleveland    "

## 2024-03-21 NOTE — PROGRESS NOTES
Jinny Smith is a 64 year old patient who comes in today for a Blood Pressure check.  Initial BP:  BP (!) 160/90      Data Unavailable  Disposition: results routed to provider and BP elevated.  Triage RN notified, patient asked to wait    Patient was asked to come in and get this rechecked with a float I will route this the provider.  Jacquelin Franklin MA 3/21/2024

## 2024-03-25 ENCOUNTER — HOSPITAL ENCOUNTER (OUTPATIENT)
Facility: CLINIC | Age: 64
End: 2024-03-25
Attending: SPECIALIST | Admitting: SPECIALIST
Payer: MEDICARE

## 2024-03-25 ENCOUNTER — TELEPHONE (OUTPATIENT)
Dept: SURGERY | Facility: CLINIC | Age: 64
End: 2024-03-25

## 2024-03-25 DIAGNOSIS — C50.112 MALIGNANT NEOPLASM OF CENTRAL PORTION OF LEFT FEMALE BREAST, UNSPECIFIED ESTROGEN RECEPTOR STATUS (H): Primary | ICD-10-CM

## 2024-03-25 DIAGNOSIS — C50.112 MALIGNANT NEOPLASM OF CENTRAL PORTION OF LEFT FEMALE BREAST, UNSPECIFIED ESTROGEN RECEPTOR STATUS (H): ICD-10-CM

## 2024-03-25 NOTE — TELEPHONE ENCOUNTER
Patient is scheduled for this Wednesday 3/27 for a mastectomy of the left breast. Patient has not had an MRI.     Routing to Stephanie to advise.     Katy Mantilla RN on 3/25/2024 at 8:20 AM

## 2024-03-25 NOTE — TELEPHONE ENCOUNTER
Dr. Esquivel has addressed this situation. See TE from today.     Katy Mantilla RN on 3/25/2024 at 8:32 AM

## 2024-03-25 NOTE — TELEPHONE ENCOUNTER
Dr. Esquivel sent new surgery orders with a change to procedure:     BIOPSY, BREAST, WITH NEEDLE LOCALIZATION AND SENTINEL LYMPH NODE BIOPSY

## 2024-03-25 NOTE — PROGRESS NOTES
Patient notified. She does not have a home BP monitor but she will pick one up and then make an appointment with Dr Anaya after her surgery.

## 2024-03-25 NOTE — TELEPHONE ENCOUNTER
Spoke to patient after reviewing oncology note and request for a lumpectomy.  I did explain the role of MRI again to the patient.  She is still refusing to get an MRI.  She only wants a lumpectomy.  I also explained the need for wire localization and she expressed understanding.  She also understands she may still end up with a mastectomy depending on pathology findings.  She also understands there may be positive margins as well.  Again she expressed understanding.  After discussion with the patient the plan at this time is to proceed to a left breast lumpectomy with wire localization and sentinel lymph node biopsy.  She agrees to do it without the MRI and understands the risks.   The procedure, risks, benefits, and alternatives were discussed and the patient agrees to proceed.    Philippe Esquivel MD, FACS

## 2024-03-25 NOTE — PROGRESS NOTES
Celso Anaya MD sent to [facility]  She should check her bp at home and make a follow up visit with me

## 2024-03-27 ENCOUNTER — HOSPITAL ENCOUNTER (OUTPATIENT)
Facility: CLINIC | Age: 64
Discharge: HOME | End: 2024-03-27
Attending: SPECIALIST | Admitting: SPECIALIST
Payer: MEDICARE

## 2024-03-27 ENCOUNTER — HOSPITAL ENCOUNTER (OUTPATIENT)
Dept: ULTRASOUND IMAGING | Facility: CLINIC | Age: 64
Discharge: HOME | End: 2024-03-27
Attending: SPECIALIST
Payer: MEDICARE

## 2024-03-27 ENCOUNTER — ANESTHESIA (OUTPATIENT)
Dept: SURGERY | Facility: CLINIC | Age: 64
End: 2024-03-27
Payer: MEDICARE

## 2024-03-27 ENCOUNTER — HOSPITAL ENCOUNTER (OUTPATIENT)
Dept: MAMMOGRAPHY | Facility: CLINIC | Age: 64
Discharge: HOME | End: 2024-03-27
Attending: SPECIALIST
Payer: MEDICARE

## 2024-03-27 ENCOUNTER — ANESTHESIA EVENT (OUTPATIENT)
Dept: SURGERY | Facility: CLINIC | Age: 64
End: 2024-03-27
Payer: MEDICARE

## 2024-03-27 ENCOUNTER — APPOINTMENT (OUTPATIENT)
Dept: MAMMOGRAPHY | Facility: CLINIC | Age: 64
End: 2024-03-27
Attending: SPECIALIST
Payer: MEDICARE

## 2024-03-27 ENCOUNTER — HOSPITAL ENCOUNTER (OUTPATIENT)
Dept: NUCLEAR MEDICINE | Facility: CLINIC | Age: 64
Setting detail: NUCLEAR MEDICINE
Discharge: HOME | End: 2024-03-27
Attending: SPECIALIST | Admitting: SPECIALIST
Payer: MEDICARE

## 2024-03-27 VITALS
HEART RATE: 84 BPM | BODY MASS INDEX: 29.76 KG/M2 | DIASTOLIC BLOOD PRESSURE: 68 MMHG | TEMPERATURE: 98.4 F | WEIGHT: 168 LBS | RESPIRATION RATE: 18 BRPM | OXYGEN SATURATION: 97 % | SYSTOLIC BLOOD PRESSURE: 124 MMHG

## 2024-03-27 DIAGNOSIS — C50.112 MALIGNANT NEOPLASM OF CENTRAL PORTION OF LEFT FEMALE BREAST, UNSPECIFIED ESTROGEN RECEPTOR STATUS (H): ICD-10-CM

## 2024-03-27 DIAGNOSIS — Z80.3 FHX: BREAST CANCER IN FIRST DEGREE RELATIVE: ICD-10-CM

## 2024-03-27 DIAGNOSIS — G89.18 POST-OP PAIN: Primary | ICD-10-CM

## 2024-03-27 PROCEDURE — 343N000001 HC RX 343: Performed by: SPECIALIST

## 2024-03-27 PROCEDURE — 250N000011 HC RX IP 250 OP 636: Performed by: SPECIALIST

## 2024-03-27 PROCEDURE — 710N000012 HC RECOVERY PHASE 2, PER MINUTE: Performed by: SPECIALIST

## 2024-03-27 PROCEDURE — 250N000009 HC RX 250: Performed by: RADIOLOGY

## 2024-03-27 PROCEDURE — 250N000013 HC RX MED GY IP 250 OP 250 PS 637: Performed by: SPECIALIST

## 2024-03-27 PROCEDURE — 250N000009 HC RX 250: Performed by: NURSE ANESTHETIST, CERTIFIED REGISTERED

## 2024-03-27 PROCEDURE — 38525 BIOPSY/REMOVAL LYMPH NODES: CPT | Mod: 79 | Performed by: SPECIALIST

## 2024-03-27 PROCEDURE — 999N000141 HC STATISTIC PRE-PROCEDURE NURSING ASSESSMENT: Performed by: SPECIALIST

## 2024-03-27 PROCEDURE — 258N000003 HC RX IP 258 OP 636: Performed by: NURSE ANESTHETIST, CERTIFIED REGISTERED

## 2024-03-27 PROCEDURE — 272N000001 HC OR GENERAL SUPPLY STERILE: Performed by: SPECIALIST

## 2024-03-27 PROCEDURE — 78195 LYMPH SYSTEM IMAGING: CPT | Mod: 26 | Performed by: SPECIALIST

## 2024-03-27 PROCEDURE — 88342 IMHCHEM/IMCYTCHM 1ST ANTB: CPT | Mod: TC | Performed by: SPECIALIST

## 2024-03-27 PROCEDURE — 19301 PARTIAL MASTECTOMY: CPT | Mod: AS | Performed by: PHYSICIAN ASSISTANT

## 2024-03-27 PROCEDURE — 250N000025 HC SEVOFLURANE, PER MIN: Performed by: SPECIALIST

## 2024-03-27 PROCEDURE — 999N000065 MA POST PROCEDURE LEFT

## 2024-03-27 PROCEDURE — 88377 M/PHMTRC ALYS ISHQUANT/SEMIQ: CPT | Performed by: SPECIALIST

## 2024-03-27 PROCEDURE — 250N000009 HC RX 250: Performed by: SPECIALIST

## 2024-03-27 PROCEDURE — 19301 PARTIAL MASTECTOMY: CPT | Mod: 79 | Performed by: SPECIALIST

## 2024-03-27 PROCEDURE — 360N000076 HC SURGERY LEVEL 3, PER MIN: Performed by: SPECIALIST

## 2024-03-27 PROCEDURE — 250N000011 HC RX IP 250 OP 636: Performed by: NURSE ANESTHETIST, CERTIFIED REGISTERED

## 2024-03-27 PROCEDURE — A9520 TC99 TILMANOCEPT DIAG 0.5MCI: HCPCS | Performed by: SPECIALIST

## 2024-03-27 PROCEDURE — 370N000017 HC ANESTHESIA TECHNICAL FEE, PER MIN: Performed by: SPECIALIST

## 2024-03-27 PROCEDURE — 710N000010 HC RECOVERY PHASE 1, LEVEL 2, PER MIN: Performed by: SPECIALIST

## 2024-03-27 PROCEDURE — 19285 PERQ DEV BREAST 1ST US IMAG: CPT | Mod: LT

## 2024-03-27 PROCEDURE — 88377 M/PHMTRC ALYS ISHQUANT/SEMIQ: CPT | Mod: 26 | Performed by: MEDICAL GENETICS

## 2024-03-27 PROCEDURE — 38792 RA TRACER ID OF SENTINL NODE: CPT | Mod: TC

## 2024-03-27 PROCEDURE — 76098 X-RAY EXAM SURGICAL SPECIMEN: CPT

## 2024-03-27 PROCEDURE — 38900 IO MAP OF SENT LYMPH NODE: CPT | Mod: 79 | Performed by: SPECIALIST

## 2024-03-27 RX ORDER — ISOSULFAN BLUE 50 MG/5ML
INJECTION, SOLUTION SUBCUTANEOUS PRN
Status: DISCONTINUED | OUTPATIENT
Start: 2024-03-27 | End: 2024-03-27 | Stop reason: HOSPADM

## 2024-03-27 RX ORDER — LIDOCAINE HYDROCHLORIDE 20 MG/ML
INJECTION, SOLUTION INFILTRATION; PERINEURAL PRN
Status: DISCONTINUED | OUTPATIENT
Start: 2024-03-27 | End: 2024-03-27

## 2024-03-27 RX ORDER — FENTANYL CITRATE 50 UG/ML
50 INJECTION, SOLUTION INTRAMUSCULAR; INTRAVENOUS EVERY 5 MIN PRN
Status: DISCONTINUED | OUTPATIENT
Start: 2024-03-27 | End: 2024-03-27 | Stop reason: HOSPADM

## 2024-03-27 RX ORDER — DEXAMETHASONE SODIUM PHOSPHATE 4 MG/ML
INJECTION, SOLUTION INTRA-ARTICULAR; INTRALESIONAL; INTRAMUSCULAR; INTRAVENOUS; SOFT TISSUE PRN
Status: DISCONTINUED | OUTPATIENT
Start: 2024-03-27 | End: 2024-03-27

## 2024-03-27 RX ORDER — OXYCODONE HYDROCHLORIDE 5 MG/1
5-10 TABLET ORAL EVERY 6 HOURS PRN
Qty: 10 TABLET | Refills: 0 | Status: SHIPPED | OUTPATIENT
Start: 2024-03-27 | End: 2024-04-08

## 2024-03-27 RX ORDER — NALOXONE HYDROCHLORIDE 0.4 MG/ML
0.1 INJECTION, SOLUTION INTRAMUSCULAR; INTRAVENOUS; SUBCUTANEOUS
Status: DISCONTINUED | OUTPATIENT
Start: 2024-03-27 | End: 2024-03-27 | Stop reason: HOSPADM

## 2024-03-27 RX ORDER — SODIUM CHLORIDE, SODIUM LACTATE, POTASSIUM CHLORIDE, CALCIUM CHLORIDE 600; 310; 30; 20 MG/100ML; MG/100ML; MG/100ML; MG/100ML
INJECTION, SOLUTION INTRAVENOUS CONTINUOUS
Status: DISCONTINUED | OUTPATIENT
Start: 2024-03-27 | End: 2024-03-27 | Stop reason: HOSPADM

## 2024-03-27 RX ORDER — KETAMINE HYDROCHLORIDE 10 MG/ML
INJECTION INTRAMUSCULAR; INTRAVENOUS PRN
Status: DISCONTINUED | OUTPATIENT
Start: 2024-03-27 | End: 2024-03-27

## 2024-03-27 RX ORDER — FENTANYL CITRATE-0.9 % NACL/PF 10 MCG/ML
PLASTIC BAG, INJECTION (ML) INTRAVENOUS PRN
Status: DISCONTINUED | OUTPATIENT
Start: 2024-03-27 | End: 2024-03-27

## 2024-03-27 RX ORDER — ONDANSETRON 2 MG/ML
INJECTION INTRAMUSCULAR; INTRAVENOUS PRN
Status: DISCONTINUED | OUTPATIENT
Start: 2024-03-27 | End: 2024-03-27

## 2024-03-27 RX ORDER — KETOROLAC TROMETHAMINE 30 MG/ML
INJECTION, SOLUTION INTRAMUSCULAR; INTRAVENOUS PRN
Status: DISCONTINUED | OUTPATIENT
Start: 2024-03-27 | End: 2024-03-27

## 2024-03-27 RX ORDER — CALCIUM CITRATE/VITAMIN D3 315MG-6.25
TABLET ORAL DAILY
COMMUNITY

## 2024-03-27 RX ORDER — CEFAZOLIN SODIUM/WATER 2 G/20 ML
2 SYRINGE (ML) INTRAVENOUS SEE ADMIN INSTRUCTIONS
Status: DISCONTINUED | OUTPATIENT
Start: 2024-03-27 | End: 2024-03-27 | Stop reason: HOSPADM

## 2024-03-27 RX ORDER — EPHEDRINE SULFATE 50 MG/ML
INJECTION, SOLUTION INTRAMUSCULAR; INTRAVENOUS; SUBCUTANEOUS PRN
Status: DISCONTINUED | OUTPATIENT
Start: 2024-03-27 | End: 2024-03-27

## 2024-03-27 RX ORDER — OXYCODONE AND ACETAMINOPHEN 5; 325 MG/1; MG/1
2 TABLET ORAL
Status: COMPLETED | OUTPATIENT
Start: 2024-03-27 | End: 2024-03-27

## 2024-03-27 RX ORDER — PROPOFOL 10 MG/ML
INJECTION, EMULSION INTRAVENOUS PRN
Status: DISCONTINUED | OUTPATIENT
Start: 2024-03-27 | End: 2024-03-27

## 2024-03-27 RX ORDER — LIDOCAINE HYDROCHLORIDE 10 MG/ML
10 INJECTION, SOLUTION EPIDURAL; INFILTRATION; INTRACAUDAL; PERINEURAL ONCE
Status: COMPLETED | OUTPATIENT
Start: 2024-03-27 | End: 2024-03-27

## 2024-03-27 RX ORDER — ONDANSETRON 2 MG/ML
4 INJECTION INTRAMUSCULAR; INTRAVENOUS EVERY 30 MIN PRN
Status: DISCONTINUED | OUTPATIENT
Start: 2024-03-27 | End: 2024-03-27 | Stop reason: HOSPADM

## 2024-03-27 RX ORDER — PROPOFOL 10 MG/ML
INJECTION, EMULSION INTRAVENOUS CONTINUOUS PRN
Status: DISCONTINUED | OUTPATIENT
Start: 2024-03-27 | End: 2024-03-27

## 2024-03-27 RX ORDER — FENTANYL CITRATE 50 UG/ML
25 INJECTION, SOLUTION INTRAMUSCULAR; INTRAVENOUS EVERY 5 MIN PRN
Status: DISCONTINUED | OUTPATIENT
Start: 2024-03-27 | End: 2024-03-27 | Stop reason: HOSPADM

## 2024-03-27 RX ORDER — ONDANSETRON 4 MG/1
4 TABLET, ORALLY DISINTEGRATING ORAL EVERY 30 MIN PRN
Status: DISCONTINUED | OUTPATIENT
Start: 2024-03-27 | End: 2024-03-27 | Stop reason: HOSPADM

## 2024-03-27 RX ORDER — LIDOCAINE 40 MG/G
CREAM TOPICAL
Status: DISCONTINUED | OUTPATIENT
Start: 2024-03-27 | End: 2024-03-27 | Stop reason: HOSPADM

## 2024-03-27 RX ORDER — CEFAZOLIN SODIUM/WATER 2 G/20 ML
2 SYRINGE (ML) INTRAVENOUS
Status: COMPLETED | OUTPATIENT
Start: 2024-03-27 | End: 2024-03-27

## 2024-03-27 RX ORDER — FENTANYL CITRATE 50 UG/ML
INJECTION, SOLUTION INTRAMUSCULAR; INTRAVENOUS PRN
Status: DISCONTINUED | OUTPATIENT
Start: 2024-03-27 | End: 2024-03-27

## 2024-03-27 RX ORDER — ENOXAPARIN SODIUM 100 MG/ML
40 INJECTION SUBCUTANEOUS
Status: COMPLETED | OUTPATIENT
Start: 2024-03-27 | End: 2024-03-27

## 2024-03-27 RX ADMIN — PROPOFOL 200 MCG/KG/MIN: 10 INJECTION, EMULSION INTRAVENOUS at 09:30

## 2024-03-27 RX ADMIN — Medication 100 MCG: at 10:05

## 2024-03-27 RX ADMIN — TILMANOCEPT 0.52 MILLICURIE: KIT at 07:38

## 2024-03-27 RX ADMIN — DEXAMETHASONE SODIUM PHOSPHATE 4 MG: 4 INJECTION, SOLUTION INTRA-ARTICULAR; INTRALESIONAL; INTRAMUSCULAR; INTRAVENOUS; SOFT TISSUE at 09:35

## 2024-03-27 RX ADMIN — LIDOCAINE HYDROCHLORIDE 5 ML: 10 INJECTION, SOLUTION EPIDURAL; INFILTRATION; INTRACAUDAL; PERINEURAL at 08:25

## 2024-03-27 RX ADMIN — ENOXAPARIN SODIUM 40 MG: 40 INJECTION SUBCUTANEOUS at 09:41

## 2024-03-27 RX ADMIN — KETOROLAC TROMETHAMINE 30 MG: 30 INJECTION, SOLUTION INTRAMUSCULAR at 10:46

## 2024-03-27 RX ADMIN — SODIUM CHLORIDE, POTASSIUM CHLORIDE, SODIUM LACTATE AND CALCIUM CHLORIDE 10 ML/HR: 600; 310; 30; 20 INJECTION, SOLUTION INTRAVENOUS at 09:16

## 2024-03-27 RX ADMIN — Medication 100 MCG: at 10:10

## 2024-03-27 RX ADMIN — Medication 2 G: at 09:19

## 2024-03-27 RX ADMIN — ONDANSETRON 4 MG: 2 INJECTION INTRAMUSCULAR; INTRAVENOUS at 10:46

## 2024-03-27 RX ADMIN — Medication 200 MCG: at 09:37

## 2024-03-27 RX ADMIN — LIDOCAINE HYDROCHLORIDE 50 MG: 20 INJECTION, SOLUTION INFILTRATION; PERINEURAL at 09:28

## 2024-03-27 RX ADMIN — Medication 20 MG: at 09:58

## 2024-03-27 RX ADMIN — Medication 200 MCG: at 09:50

## 2024-03-27 RX ADMIN — Medication 200 MCG: at 10:32

## 2024-03-27 RX ADMIN — PROPOFOL 150 MG: 10 INJECTION, EMULSION INTRAVENOUS at 09:28

## 2024-03-27 RX ADMIN — Medication 100 MCG: at 09:34

## 2024-03-27 RX ADMIN — MIDAZOLAM 1 MG: 1 INJECTION INTRAMUSCULAR; INTRAVENOUS at 09:20

## 2024-03-27 RX ADMIN — PROPOFOL 50 MG: 10 INJECTION, EMULSION INTRAVENOUS at 10:21

## 2024-03-27 RX ADMIN — FENTANYL CITRATE 50 MCG: 50 INJECTION INTRAMUSCULAR; INTRAVENOUS at 10:02

## 2024-03-27 RX ADMIN — OXYCODONE HYDROCHLORIDE AND ACETAMINOPHEN 1 TABLET: 5; 325 TABLET ORAL at 11:58

## 2024-03-27 RX ADMIN — FENTANYL CITRATE 50 MCG: 50 INJECTION INTRAMUSCULAR; INTRAVENOUS at 09:20

## 2024-03-27 RX ADMIN — Medication 10 MG: at 09:58

## 2024-03-27 ASSESSMENT — ACTIVITIES OF DAILY LIVING (ADL)
ADLS_ACUITY_SCORE: 37
ADLS_ACUITY_SCORE: 35

## 2024-03-27 ASSESSMENT — LIFESTYLE VARIABLES: TOBACCO_USE: 1

## 2024-03-27 NOTE — PROGRESS NOTES
SBAR Wire Localization     SITUATION:  Patient to breast imaging center for imaging guided wire localizations before breast lumpectomy or excision biopsy with sentinel node injection.    BACKGROUND:  Breast imaging cancer, breast abnormality  Ordered procedure completed: Yes  Special needs identified: No     ASSESSMENT:  SBAR report called to patient care unit because of unexpected event in radiology: No  Allergies and medication list reviewed prior to procedure. Yes  Skin cleansed with ChloraPrep One-Step.  Anesthesia: approximately 5 ml of 1% Lidocaine injection subcutaneous before wire insertion administered by the radiologist.   Gauze dressing over insertion site(s).  Post procedure mammogram completed: Yes    Patient tolerance:good    RECOMMENDATIONS:  Patient transferred to Same Day Surgery in stable condition via wheelchair with Breast Imaging Staff.    Please call [facility] [place] [phone] if there are any questions.

## 2024-03-27 NOTE — ANESTHESIA PREPROCEDURE EVALUATION
Anesthesia Pre-Procedure Evaluation    Patient: Jinny Smith   MRN: [id number] : 1960        Procedure : Procedure(s):  LAPAROSCOPY, DIAGNOSTIC, BY GENERAL SURGERY  LAPAROTOMY, EXPLORATORY, WITH LYSIS OF ADHESIONS          Past Medical History:   Diagnosis Date    Family history of colon cancer     Hyperlipidemia LDL goal <130 2012    Hyperplastic colonic polyp     Hypertension goal BP (blood pressure) < 140/80 2012    Knee joint effusion 2012      Past Surgical History:   Procedure Laterality Date    GYN SURGERY      HYSTERECTOMY TOTAL ABDOMINAL      LAPAROSCOPY DIAGNOSTIC (GENERAL) N/A 2024    Procedure: LAPAROSCOPY, DIAGNOSTIC, laparoscopic lysis of adhesions;  Surgeon: Philippe Esquivel MD;  Location: [facility] OR    LAPAROTOMY, LYSIS ADHESIONS, COMBINED N/A 2024    Procedure: LAPAROTOMY, EXPLORATORY, WITH open LYSIS OF ADHESIONS, reduction of internal hernia;  Surgeon: Philippe Esquivel MD;  Location: [facility] OR      Allergies   Allergen Reactions    Lorazepam Itching and Rash     Rash on chest and upper neck after 20 minutes. Did also get itchy.      Social History     Tobacco Use    Smoking status: Former     Packs/day: .5     Types: Cigarettes     Quit date: 2023     Years since quittin.2    Smokeless tobacco: Former     Quit date: 2013   Substance Use Topics    Alcohol use: Yes      Wt Readings from Last 1 Encounters:   03/15/24 76.3 kg (168 lb 3.2 oz)        Anesthesia Evaluation   Pt has had prior anesthetic. Type: General.    No history of anesthetic complications       ROS/MED HX  ENT/Pulmonary:     (+)                tobacco use, Current use,                       Neurologic:  - neg neurologic ROS     Cardiovascular:     (+)  hypertension- -   -  - -                                 Previous cardiac testing   Echo: Date: Results:    Stress Test:  Date: Results:    ECG Reviewed:  Date: 23 Results:  - NSR    Cath:  Date: Results:      METS/Exercise Tolerance:    "  Hematologic:       Musculoskeletal:       GI/Hepatic: Comment: Pt with SBO - most likely partial      Renal/Genitourinary:  - neg Renal ROS     Endo:  - neg endo ROS     Psychiatric/Substance Use:     (+) psychiatric history anxiety       Infectious Disease:  - neg infectious disease ROS     Malignancy:       Other:  - neg other ROS          Physical Exam    Airway        Mallampati: II   TM distance: > 3 FB   Neck ROM: full   Mouth opening: > 3 cm    Respiratory Devices and Support         Dental       (+) Minor Abnormalities - some fillings, tiny chips      Cardiovascular   cardiovascular exam normal          Pulmonary                   OUTSIDE LABS:  CBC:   Lab Results   Component Value Date    WBC 7.9 03/15/2024    WBC 9.5 01/04/2024    HGB 13.4 03/15/2024    HGB 12.1 01/04/2024    HCT 40.8 03/15/2024    HCT 37.4 01/04/2024     03/15/2024     01/04/2024     BMP:   Lab Results   Component Value Date     03/15/2024     01/30/2024    POTASSIUM 4.1 03/15/2024    POTASSIUM 4.7 01/30/2024    CHLORIDE 101 03/15/2024    CHLORIDE 104 01/30/2024    CO2 23 03/15/2024    CO2 25 01/30/2024    BUN 13.9 03/15/2024    BUN 9.7 01/30/2024    CR 0.70 03/15/2024    CR 0.72 01/30/2024     (H) 03/15/2024     (H) 01/30/2024     COAGS:   Lab Results   Component Value Date    INR 1.11 01/02/2024     POC: No results found for: \"BGM\", \"HCG\", \"HCGS\"  HEPATIC:   Lab Results   Component Value Date    ALBUMIN 4.7 03/15/2024    PROTTOTAL 8.4 (H) 03/15/2024    ALT 15 03/15/2024    AST 19 03/15/2024    ALKPHOS 95 03/15/2024    BILITOTAL 0.5 03/15/2024     OTHER:   Lab Results   Component Value Date    LACT 1.1 12/31/2023    A1C 5.6 03/15/2024    KAREN 9.9 03/15/2024    MAG 1.8 01/05/2024    LIPASE 18 12/31/2023    TSH 1.07 03/15/2024    CRP 7.1 08/10/2012    SED 9 08/10/2012       Anesthesia Plan    ASA Status:  2    NPO Status:  NPO Appropriate    Anesthesia Type: General.     - Airway: ETT   Induction: " "Intravenous.   Maintenance: Balanced.        Consents    Anesthesia Plan(s) and associated risks, benefits, and realistic alternatives discussed. Questions answered and patient/representative(s) expressed understanding.     - Discussed: Risks, Benefits and Alternatives for BOTH SEDATION and the PROCEDURE were discussed     - Discussed with:  Patient      - Extended Intubation/Ventilatory Support Discussed: No.      - Patient is DNR/DNI Status: No     Use of blood products discussed: Yes.     - Discussed with: Patient.     Postoperative Care    Pain management: IV analgesics, Peripheral nerve block (Single Shot).   PONV prophylaxis: Ondansetron (or other 5HT-3), Dexamethasone or Solumedrol, Background Propofol Infusion     Comments:    Other Comments: The risks and benefits of anesthesia, and the alternatives where applicable, have been discussed with the patient, and they wish to proceed.                JOANNA Gama CRNA    I have reviewed the pertinent notes and labs in the chart from the past 30 days and (re)examined the patient.  Any updates or changes from those notes are reflected in this note.             # Overweight: Estimated body mass index is 29.8 kg/m  as calculated from the following:    Height as of 3/15/24: 1.6 m (5' 3\").    Weight as of 3/15/24: 76.3 kg (168 lb 3.2 oz).      "

## 2024-03-27 NOTE — ANESTHESIA PROCEDURE NOTES
Airway       Patient location during procedure: OR  Staff -        CRNA: Galo Freeman APRN CRNA       Performed By: CRNA  Consent for Airway        Urgency: elective  Indications and Patient Condition       Indications for airway management: [place]-procedural       Induction type:intravenous       Mask difficulty assessment: 1 - vent by mask    Final Airway Details       Final airway type: supraglottic airway    Supraglottic Airway Details        Type: LMA       Brand: LMA Unique       LMA size: 3    Post intubation assessment        Placement verified by: capnometry, equal breath sounds and chest rise        Number of attempts at approach: 1       Number of other approaches attempted: 0       Secured with: plastic tape       Ease of procedure: easy       Dentition: Intact and Unchanged

## 2024-03-27 NOTE — ANESTHESIA CARE TRANSFER NOTE
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    Anesthesia Type:   General     Note:    Oropharynx: oropharynx clear of all foreign objects and spontaneously breathing  Level of Consciousness: awake  Oxygen Supplementation: room air    Independent Airway: airway patency satisfactory and stable  Dentition: dentition unchanged  Vital Signs Stable: post-procedure vital signs reviewed and stable  Report to RN Given: handoff report given  Patient transferred to: Phase II    Handoff Report: Identifed the Patient, Identified the Reponsible Provider, Reviewed the pertinent medical history, Discussed the surgical course, Reviewed Intra-OP anesthesia mangement and issues during anesthesia, Set expectations for post-procedure period and Allowed opportunity for questions and acknowledgement of understanding      Vitals:  Vitals Value Taken Time   /72 03/27/24 1120   Temp 98.06  F (36.7  C) 03/27/24 1121   Pulse 98 03/27/24 1121   Resp 33 03/27/24 1121   SpO2 91 % 03/27/24 1121   Vitals shown include unfiled device data.    Electronically Signed By: JOANNA Gama CRNA  March 27, 2024  11:22 AM   0

## 2024-03-27 NOTE — OP NOTE
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  Injection of radioactive tracer for sentinel lymph node biopsy  2.  Injection of isosulfan blue for sentinel lymph node biopsy  3.  Left axillary sentinel lymph node biopsy  4.  Excision left breast mass with wire localization   Surgeon: Philippe Esquivel MD, FACS   Assistant(s): Luciana Arora  PA-C  assist was needed for positioning/prepping, visualization and bleeding management by retraction, suctioning, and suturing/closure.   Anesthesia: General endotracheal anesthesia   Estimated blood loss: Less than 10 ml   Total IV fluids: (See anesthesia record)   Blood transfusion: No transfusion was given during surgery   Total urine output: (See anesthesia record)   Drains: None   Specimens: Left axillary [place] lymph nodes x 2 and left breast mass   Implants: None   Findings: Count of 475 at the skin, 2671 sentinel lymph node #1, 2045 sentinel lymph node #2, wire and clip in middle of specimen.   Complications: None   Condition: Stable   Comments: Indications for procedure: This is a 64 related on a routine screening mammogram was found to have a left breast mass.  Subsequent biopsy revealed a carcinoma of indeterminate origin.  After extensive discussion between the patient and her oncologist it was elected to perform a left breast lumpectomy with wire localization and sentinel lymph node biopsy.  The patient refused the preoperative MRI.  She was offered mastectomy with reconstruction.       Details procedure:  With the cooperation the patient in the preop holding the left breast was marked.  She was then injected with radioactive tracer 2 hours prior to the start of the procedure at the 6 o'clock position of the nipple/areola junction.  She was then sent to radiology for wire through  the mass.  After about an hour and a half she was taken the operating the left breast and axilla were prepped and draped in a sterile fashion.  A timeout was performed confirm the date and the patient was the procedure performed.  The area around the wire was then injected with isosulfan blue and massaged for 5 minutes by the clock.  The neoprobe was then used to scan the axilla with a count of 475 at the skin.  The area over this was then incised using a 15 blade after injecting local anesthetic.  Subcutaneous tissue was opened using cautery.  We then dissected down using Metzenbaum scissors.  Several blue lymphatics were seen going to a central area.  This was scanned with the neoprobe and found to have a high count.  It was then dissected free with Metzenbaum scissors.  The specimen was then placed on the neoprobe with a count of 2671.  It was submitted as sentinel lymph node #1.  We rescan the axilla and found a second area of radioactive uptake.  This was dissected free using Metzenbaum scissors and the specimen had a count of 2045.  We then rescan the axilla without any further uptake of radioactive tracer.  We also inspected the axilla and found there are no Lymphazurin blue stained lymph nodes.  There are also no palpable lymph nodes in the axilla.  The areas copiously irrigated and packed with a dry gauze.  We then turned our attention to the left breast.  A curvilinear incision was made around the wire in an elliptical fashion.  We then began to dissect around the wire using cautery down to the chest wall.  The specimen was then dissected off the chest wall using cautery.  After excising the entire mass with cautery around the wire hemostasis was achieved using cautery.  It was then painted with the appropriate paints in the proper orientation.  It was sent to radiology and final examination revealed the wire to be in the center of the mass as well as the clip to be present in the specimen.  There is then  copious.  All fluid was suctioned out.  Hemostasis was achieved using cautery.  Subcutaneous tissue was then reapproximated using 3-0 Vicryl to minimize the dead space in both incision.  Skin was then closed using 4-0 Monocryl on both incisions.  Dermabond glue was applied followed by a pressure dressing.  The patient was taken from the operative to recovery stable condition to be sent home.    Philippe Esquivel MD, FACS

## 2024-03-28 NOTE — ANESTHESIA POSTPROCEDURE EVALUATION
Patient: Jinny Smith    Procedure: Procedure(s):  BIOPSY, LEFT BREAST, WITH NEEDLE LOCALIZATION AND SENTINEL LYMPH NODE BIOPSY       Anesthesia Type:  General    Note:  Disposition: Outpatient   Postop Pain Control: Uneventful            Sign Out: Well controlled pain   PONV: No   Neuro/Psych: Uneventful            Sign Out: Acceptable/Baseline neuro status   Airway/Respiratory: Uneventful            Sign Out: Acceptable/Baseline resp. status   CV/Hemodynamics: Uneventful            Sign Out: Acceptable CV status   Other NRE: NONE   DID A NON-ROUTINE EVENT OCCUR? No    Event details/Postop Comments:  Pt was happy with anesthesia care.  No complications.  I will follow up with the pt if needed.           Last vitals:  Vitals Value Taken Time   /64 03/27/24 1130   Temp 98.24  F (36.8  C) 03/27/24 1130   Pulse 89 03/27/24 1130   Resp 10 03/27/24 1130   SpO2 96 % 03/27/24 1130   Vitals shown include unfiled device data.    Electronically Signed By: JOANNA Echavarria CRNA  March 27, 2024  7:35 PM

## 2024-03-29 ENCOUNTER — MYC REFILL (OUTPATIENT)
Dept: INTERNAL MEDICINE | Facility: CLINIC | Age: 64
End: 2024-03-29
Payer: MEDICARE

## 2024-03-29 DIAGNOSIS — I10 HYPERTENSION GOAL BP (BLOOD PRESSURE) < 140/80: ICD-10-CM

## 2024-03-29 RX ORDER — LISINOPRIL 20 MG/1
40 TABLET ORAL DAILY
Qty: 180 TABLET | Refills: 2 | OUTPATIENT
Start: 2024-03-29

## 2024-04-02 PROCEDURE — 88360 TUMOR IMMUNOHISTOCHEM/MANUAL: CPT | Mod: 26 | Performed by: PATHOLOGY

## 2024-04-02 PROCEDURE — 88307 TISSUE EXAM BY PATHOLOGIST: CPT | Mod: 26 | Performed by: PATHOLOGY

## 2024-04-02 PROCEDURE — 88342 IMHCHEM/IMCYTCHM 1ST ANTB: CPT | Mod: 26 | Performed by: PATHOLOGY

## 2024-04-03 LAB — INTERPRETATION: NORMAL

## 2024-04-05 LAB
PATH REPORT.ADDENDUM SPEC: ABNORMAL
PATH REPORT.COMMENTS IMP SPEC: ABNORMAL
PATH REPORT.COMMENTS IMP SPEC: YES
PATH REPORT.FINAL DX SPEC: ABNORMAL
PATH REPORT.GROSS SPEC: ABNORMAL
PATH REPORT.MICROSCOPIC SPEC OTHER STN: ABNORMAL
PATH REPORT.RELEVANT HX SPEC: ABNORMAL
PATHOLOGY SYNOPTIC REPORT: ABNORMAL
PHOTO IMAGE: ABNORMAL

## 2024-04-08 ENCOUNTER — OFFICE VISIT (OUTPATIENT)
Dept: SURGERY | Facility: CLINIC | Age: 64
End: 2024-04-08
Payer: MEDICARE

## 2024-04-08 VITALS
SYSTOLIC BLOOD PRESSURE: 140 MMHG | BODY MASS INDEX: 29.76 KG/M2 | WEIGHT: 168 LBS | TEMPERATURE: 97 F | DIASTOLIC BLOOD PRESSURE: 82 MMHG

## 2024-04-08 DIAGNOSIS — Z09 POSTOP CHECK: Primary | ICD-10-CM

## 2024-04-08 PROCEDURE — 99024 POSTOP FOLLOW-UP VISIT: CPT | Performed by: SPECIALIST

## 2024-04-08 ASSESSMENT — PAIN SCALES - GENERAL: PAINLEVEL: NO PAIN (0)

## 2024-04-08 NOTE — CONFIDENTIAL NOTE
Medical Oncology Update    Pathology reviewed- positive anterior margin, pending follow up with Dr. Esquivel- margin re-excision + adjuvant RT vs mastectomy  Oncotype requested  Hold on placing rad onc referral, given the above    Sweta MAKI Brand  Hematology/Oncology  [facility]

## 2024-04-08 NOTE — PROGRESS NOTES
Follow-up for left breast mastectomy and sentinel node biopsy    Subjective:   Patient feels good.  No complaints or concerns.      Objective:  B/P: 140/82, T: 97, P: Data Unavailable, R: Data Unavailable  Left breast: Incision healing well.  No signs of infection.      Pathology:  A.  Lymph node, left axillary sentinel #1 -7528, excisional biopsy:  -One lymph node, negative for metastatic carcinoma (0/1).  -See comment.     B.  Lymph node, left axillary sentinel #2 -2045, excisional biopsy:  -One lymph node, negative for metastatic carcinoma (0/1).  -See comment.     C.  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  -Single focus,  3.0 mm in greatest size (calculation as follows: Mucinous carcinoma spans six contiguous slices, # =3.0 cm ); additional dimensions: 2.7 x 1.7 cm.  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  -Carcinoma in-situ spans nine contiguous slices, # 5=13= approximately 4.5 cm.  -Previous biopsy site identified.  -Skeletal muscle is identified and uninvolved by tumor.  -One intramammary lymph node, negative for metastatic carcinoma (0/1).  -See synoptic form and comment for additional details and margin status.  -Additional findings: Flor[place] usual duct hyperplasia, duct ectasia, fibrocystic change, and fibroadenomas.            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  Her sentinel nodes were negative.  On the excision there is also extensive DCIS.  Also the anterior margin is positive.  Discussed all these findings with the patient expressed understanding.  I also discussed in view of the DCIS she would need radiation.  She expressed understanding.  I discussed the  importance of clear margins.  I discussed the option of completion mastectomy versus reexcision of the cavity.  The patient would like to think about it and speak to Dr. Brand her oncologist first.  She will call me when she decides.    Philippe Esquivel MD, FACS

## 2024-04-08 NOTE — LETTER
4/8/2024         RE: iJnny Smith  [address]  [facility] [address]        Dear Colleague,    Thank you for referring your patient, Jinny Smith, to the [facility]. Please see a copy of my visit note below.    Follow-up for left breast mastectomy and sentinel node biopsy    Subjective:   Patient feels good.  No complaints or concerns.      Objective:  B/P: 140/82, T: 97, P: Data Unavailable, R: Data Unavailable  Left breast: Incision healing well.  No signs of infection.      Pathology:  A.  Lymph node, left axillary sentinel #1 -6863, excisional biopsy:  -One lymph node, negative for metastatic carcinoma (0/1).  -See comment.     B.  Lymph node, left axillary sentinel #2 -2045, excisional biopsy:  -One lymph node, negative for metastatic carcinoma (0/1).  -See comment.     C.  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  -Single focus,  3.0 mm in greatest size (calculation as follows: Mucinous carcinoma spans six contiguous slices, # =3.0 cm ); additional dimensions: 2.7 x 1.7 cm.  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  -Carcinoma in-situ spans nine contiguous slices, # 5=13= approximately 4.5 cm.  -Previous biopsy site identified.  -Skeletal muscle is identified and uninvolved by tumor.  -One intramammary lymph node, negative for metastatic carcinoma (0/1).  -See synoptic form and comment for additional details and margin status.  -Additional findings: [place] usual duct hyperplasia, duct ectasia, fibrocystic change, and fibroadenomas.            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  Her sentinel nodes were negative.  On the excision  there is also extensive DCIS.  Also the anterior margin is positive.  Discussed all these findings with the patient expressed understanding.  I also discussed in view of the DCIS she would need radiation.  She expressed understanding.  I discussed the importance of clear margins.  I discussed the option of completion mastectomy versus reexcision of the cavity.  The patient would like to think about it and speak to Dr. Brand her oncologist first.  She will call me when she decides.    Philippe Esquivel MD, FACS      Again, thank you for allowing me to participate in the care of your patient.        Sincerely,        Philippe Esquivel MD

## 2024-04-09 ENCOUNTER — TELEPHONE (OUTPATIENT)
Dept: ONCOLOGY | Facility: CLINIC | Age: 64
End: 2024-04-09
Payer: MEDICARE

## 2024-04-09 NOTE — TELEPHONE ENCOUNTER
[facility]:  Care Coordination Note    Order for Oncotype Dx testing was submitted via the [employer] Online portal today, as requested by Dr. Brand. Testing was requested for surgical pathology case number [id number], collected on 03/27/24 (specimen site: Breast, left).  A copy of patient's pathology results report, face sheet, and copies of insurance cards were faxed to [employer] at [phone].      *Keep appointment scheduled for 04/16/24 per patient request. Provider aware that oncotype score testing will not be available.    Raina Jeter RNCC, BSN  Oncology Care Coordinator  [facility]

## 2024-04-15 ENCOUNTER — HOSPITAL ENCOUNTER (OUTPATIENT)
Dept: BONE DENSITY | Facility: CLINIC | Age: 64
Discharge: HOME | End: 2024-04-15
Attending: INTERNAL MEDICINE | Admitting: INTERNAL MEDICINE
Payer: MEDICARE

## 2024-04-15 DIAGNOSIS — Z78.0 POST-MENOPAUSAL: ICD-10-CM

## 2024-04-15 PROCEDURE — 77080 DXA BONE DENSITY AXIAL: CPT

## 2024-04-16 ENCOUNTER — ONCOLOGY VISIT (OUTPATIENT)
Dept: ONCOLOGY | Facility: CLINIC | Age: 64
End: 2024-04-16
Payer: MEDICARE

## 2024-04-16 VITALS
WEIGHT: 168 LBS | HEIGHT: 63 IN | TEMPERATURE: 98.5 F | DIASTOLIC BLOOD PRESSURE: 88 MMHG | SYSTOLIC BLOOD PRESSURE: 148 MMHG | BODY MASS INDEX: 29.77 KG/M2

## 2024-04-16 DIAGNOSIS — Z80.3 FAMILY HISTORY OF MALIGNANT NEOPLASM OF BREAST: ICD-10-CM

## 2024-04-16 DIAGNOSIS — D53.9 MACROCYTIC ANEMIA: ICD-10-CM

## 2024-04-16 DIAGNOSIS — Z80.0 FAMILY HISTORY OF MALIGNANT NEOPLASM OF COLON: ICD-10-CM

## 2024-04-16 DIAGNOSIS — C50.112 MALIGNANT NEOPLASM OF CENTRAL PORTION OF LEFT FEMALE BREAST, UNSPECIFIED ESTROGEN RECEPTOR STATUS (H): Primary | ICD-10-CM

## 2024-04-16 DIAGNOSIS — Z78.0 POST-MENOPAUSAL: ICD-10-CM

## 2024-04-16 LAB
INTERPRETATION: NORMAL
INTERPRETATION: NORMAL
LAB PDF RESULT: NORMAL
LAB PDF RESULT: NORMAL
LOCATION OF TASK: NORMAL
LOCATION OF TASK: NORMAL
SIGNIFICANT RESULTS: NORMAL
SIGNIFICANT RESULTS: NORMAL
SPECIMEN DESCRIPTION: NORMAL
SPECIMEN DESCRIPTION: NORMAL
TEST DETAILS, MDL: NORMAL
TEST DETAILS, MDL: NORMAL

## 2024-04-16 PROCEDURE — 99214 OFFICE O/P EST MOD 30 MIN: CPT | Performed by: INTERNAL MEDICINE

## 2024-04-16 ASSESSMENT — PAIN SCALES - GENERAL: PAINLEVEL: NO PAIN (0)

## 2024-04-16 NOTE — LETTER
"    4/16/2024         RE: Jinny Smith  [address]  [facility] [address]        Dear Colleague,    Thank you for referring your patient, Jinny Smith, to the [facility]. Please see a copy of my visit note below.    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 Pt is s/p hysterectomy and possible BSO in 1980s for ovarian cysts and reported dysplasia ?location.     - 12/31/23 CT AP w/contrast for abd pain, N/V:   incidental finding \"breast mass in the left inferior breast at approximately the 6:00 position measuring 1.5 x 1.1 cm\"  - 1/31/24 Diagnostic bilateral breast mammogram:  Left breast, lower central posterior aspect, round mass and a few smaller adjacent round nodules. In the retro-areolar region, inferiorly, at or near skin, few benign appearing calcifications  - 1/31/24 Targeted LEFT breast ultrasound:  Left breast, In the 6:00 position 3 cm from the nipple there is a 12 x 11 x 19 mm oval heterogeneous mass that correlates with the largest mass seen on previous mammogram. The surrounding tissue is heterogeneous. Only one definitive adjacent nodule is seen.  This is approximately 4 mm away from the main mass and is bilobed and measures 9 x 4 x 5 mm.  The subtle nodular opacities seen about the dominant mass cover an area of roughly 3.5 x 2.8 cm on the CC view. No other definable " masses are seen.  - 2/13/24 Ultrasound-guided LEFT breast core needle biopsy of lesion at 6 oclock, 3 cm from nipple  PATHOLOGY  A. Left breast mass, ultrasound-guided core biopsy:   -Fragmented portions of carcinoma with focal solid papillary and mucinous features.  -Associated stroma with fibrosis, hemosiderin and acute hemorrhage suggesting the wall of a cyst.    -Surgical excision is needed for definitive diagnosis and subclassification (see Comment).  The neoplastic cells are strongly and diffusely positive for estrogen receptor (91 to 100%). The progesterone receptor is also positive (moderate to strong staining, approximately 75%). Immunohistochemistry for p63 shows scattered positive cells within both the solid and cribriform areas.       Comment    The findings in this biopsy confirm malignancy but the exact subclassification and the distinction between in situ and invasive carcinoma cannot be determined due to the nature of the tumor cells, the presence of a possible cyst wall, and the disrupted nature of the biopsy fragments.  The differential diagnosis includes solid papillary carcinoma with mucinous features and a combination of both solid papillary carcinoma and mucinous carcinoma.  Complete surgical excision is needed for definitive diagnosis.  The complex left breast ultrasound findings are also noted.     - 2/26/24 consult with Dr. Esquivel- recommended MRI breast to assess extent of disease and consider BCT, pt does not wish to do MRI, plan for mastectomy w/SLNBx and no reconstruction on 3/27/24    Lifetime estrogen exposure:  Menarche: 10  Last menstrual period: in her late 20s, 1980s  Age of first pregnancy: 17 y/o  Number of pregnancies: 1  Weight gain: 20lbs in last year  Exposure to exogenous estrogen: OCPs 2 years, estrogen after hysterectomy x10-15 years     Family history of:  1.  Breast cancer including male breast cancer: mother- breast, lumpectomy in her early 70s, RT; paternal 1/2 aunt-  breast cancer, details unknown   2. Ovarian cancer: negative  3.  Pancreatic cancer: negative  4.  Prostate cancer: negative  5. Diffuse gastric cancer (if lobular breast CA): negative  6. Uterine cancer: negative  7. Colon cancer: father- dx in his late 50s,  age 66     Patient denies the following:  History of DVT/PE/VTE for self or family   Cataracts  Severe arthralgias and myalgias  Cardiovascular risk factors including diabetes  Osteoporosis  Current use of antidepressants    Pt has htn, hld. Pt is s/p hysterectomy and possible BSO in  for ovarian cysts and reported dysplasia ?location.     Regarding adrenal nodule:  - 23 CT AP: Indeterminant 1.1 cm left adrenal nodule   - 24 CT Abdomen w/wo contrast: A small round previously seen left adrenal nodule  measures 10 mm and demonstrates an average density of 24 Hounsfield units on unenhanced images, 180 Hounsfield units on early postcontrast imaging, and 67 Hounsfield units on 15 minute delayed postcontrast imaging, which results in an absolute washout of 48.8% (absolute washout less than 60% is indeterminant) and a relative washout of 38% (relative washout less than 40% is indeterminate)  IMPRESSION:   1.  Technically indeterminate 10 mm left adrenal nodule. Recommend correlation with adrenal function studies. Consider follow-up renal MRI for further characterization if clinically indicated. Otherwise, consider six-month follow-up CT to assess for stability       Pt has alcohol a few times a week, prior to that 2 drinks, 4x/week, vodka.     INTERIM HISTORY:  Pt was unable to tolerate MRI with premedications w/ativan, had possible rash related to that. She did not wish to reattempt MRI with different sedation.     - 3/27/24 left breast lumpectomy with left axillary SLNBx with Dr. Esquivel  PATHOLOGY  A.  Lymph node, left axillary sentinel #[phone], excisional biopsy:  -One lymph node, negative for metastatic carcinoma (0/1).     B.  Lymph node,  left axillary sentinel #[phone], excisional biopsy:  -One lymph node, negative for metastatic carcinoma (0/1).     C.  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  -Single focus,  [30.0] mm in greatest size (3x2.7x1.7cm) (calculation as follows: Mucinous carcinoma spans six contiguous slices, # =3.0 cm ); additional dimensions: 2.7 x 1.7 cm.  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  -Carcinoma in-situ spans nine contiguous slices, # 5=13= approximately 4.5 cm.  - LVI negative, dermal LVI negative  -Previous biopsy site identified.  -Skeletal muscle is identified and uninvolved by tumor.  -One intramammary lymph node, negative for metastatic carcinoma (0/1).  -margin:    MARGINS   Margin Status for Invasive Carcinoma  Invasive carcinoma present at margin   Margin(s) Involved by Invasive Carcinoma  Anterior: 0.1 mm extent   Distance from Invasive Carcinoma to Anterior Margin  at margin mm   Distance from Invasive Carcinoma to Posterior Margin  Greater than: 10 mm   Distance from Invasive Carcinoma to Superior Margin  8.2 mm   Distance from Invasive Carcinoma to Inferior Margin  7 mm   Distance from Invasive Carcinoma to Medial Margin  Greater than: 10 mm   Distance from Invasive Carcinoma to Lateral Margin  Greater than: 10 mm   Margin Status for DCIS  DCIS present at margin   Margin(s) Involved by DCIS  Superior: 1 mm in extent     Lateral: 0.8 mm in extent   Distance from DCIS to Anterior Margin  Less than: 1 mm   Distance from DCIS to Posterior Margin  Greater than: 10 mm   Distance from DCIS to Superior Margin  at margin mm   Distance from DCIS to Inferior Margin  5 mm   Distance from DCIS to Medial Margin  Greater than: 10 mm   Distance from  DCIS to Lateral Margin  at margin mm     REGIONAL LYMPH NODES   Regional Lymph Node Status  All regional lymph nodes negative for tumor   Total Number of Lymph Nodes Examined (sentinel and non-sentinel)  3   Number of [place] Nodes Examined  2   - ER positive (%), UT positive (%), her2 2+ on IHC, FISH negative  -Additional findings: [place] usual duct hyperplasia, duct ectasia, fibrocystic change, and fibroadenomas.  AJCC 8th edition: pT2 pN0 (sn, 0/2)    REVIEW OF SYSTEMS:   A 14 point ROS was reviewed with pertinent positives and negatives in the HPI.        HOME MEDICATIONS:  Current Outpatient Medications   Medication Sig Dispense Refill     atorvastatin (LIPITOR) 20 MG tablet Take 1 tablet (20 mg) by mouth daily 90 tablet 3     lisinopril (ZESTRIL) 20 MG tablet Take 2 tablets (40 mg) by mouth daily 180 tablet 2         ALLERGIES:  No Known Allergies      PAST MEDICAL HISTORY:  Past Medical History:   Diagnosis Date     Family history of colon cancer      Hyperlipidemia LDL goal <130 8/28/2012     Hyperplastic colonic polyp      Hypertension goal BP (blood pressure) < 140/80 8/28/2012     Knee joint effusion 8/28/2012         PAST SURGICAL HISTORY:  Past Surgical History:   Procedure Laterality Date     GYN SURGERY       HYSTERECTOMY TOTAL ABDOMINAL       LAPAROSCOPY DIAGNOSTIC (GENERAL) N/A 1/2/2024    Procedure: LAPAROSCOPY, DIAGNOSTIC, laparoscopic lysis of adhesions;  Surgeon: Philippe Esquivel MD;  Location: [facility] OR     LAPAROTOMY, LYSIS ADHESIONS, COMBINED N/A 1/2/2024    Procedure: LAPAROTOMY, EXPLORATORY, WITH open LYSIS OF ADHESIONS, reduction of internal hernia;  Surgeon: Philippe Esquivel MD;  Location: [facility] OR         SOCIAL HISTORY:  Social History     Socioeconomic History     Marital status:      Spouse name: Not on file     Number of children: Not on file     Years of education: Not on file     Highest education level: Not on file   Occupational History     Not on file   Tobacco  Use     Smoking status: Former     Packs/day: .5     Types: Cigarettes     Quit date: 2023     Years since quittin.2     Smokeless tobacco: Former     Quit date: 2013   Vaping Use     Vaping Use: Never used   Substance and Sexual Activity     Alcohol use: Yes     Drug use: No     Sexual activity: Yes     Partners: Male   Other Topics Concern     Parent/sibling w/ CABG, MI or angioplasty before 65F 55M? Yes   Social History Narrative     Not on file     Social Determinants of Health     Financial Resource Strain: Low Risk  (2024)    Financial Resource Strain      Within the past 12 months, have you or your family members you live with been unable to get utilities (heat, electricity) when it was really needed?: No   Food Insecurity: Low Risk  (2024)    Food Insecurity      Within the past 12 months, did you worry that your food would run out before you got money to buy more?: No      Within the past 12 months, did the food you bought just not last and you didn't have money to get more?: No   Transportation Needs: Low Risk  (2024)    Transportation Needs      Within the past 12 months, has lack of transportation kept you from medical appointments, getting your medicines, non-medical meetings or appointments, work, or from getting things that you need?: No   Physical Activity: Unknown (2024)    Exercise Vital Sign      Days of Exercise per Week: 2 days      Minutes of Exercise per Session: Patient declined   Stress: No Stress Concern Present (2024)    [place] [employer] - Occupational Stress Questionnaire      Feeling of Stress : Only a little   Social Connections: Unknown (2024)    Social Connection and Isolation Panel [NHANES]      Frequency of Communication with Friends and Family: Not on file      Frequency of Social Gatherings with Friends and Family: Once a week      Attends Caodaism Services: Not on file      Active Member of Clubs or  "Organizations: Not on file      Attends Club or Organization Meetings: Not on file      Marital Status: Not on file   Interpersonal Safety: Low Risk  (2/27/2024)    Interpersonal Safety      Do you feel physically and emotionally safe where you currently live?: Yes      Within the past 12 months, have you been hit, slapped, kicked or otherwise physically hurt by someone?: No      Within the past 12 months, have you been humiliated or emotionally abused in other ways by your partner or ex-partner?: No   Housing Stability: Low Risk  (2/27/2024)    Housing Stability      Do you have housing? : Yes      Are you worried about losing your housing?: No         FAMILY HISTORY:  No family history on file.      PHYSICAL EXAM:  Vital signs:  BP (!) 148/88   Temp 98.5  F (36.9  C) (Temporal)   Ht 1.6 m (5' 3\")   Wt 76.2 kg (168 lb)   BMI 29.76 kg/m           GENERAL/CONSTITUTIONAL: No acute distress.  EYES: Pupils are equal and round. Extraocular movements intact without nystagmus.  No scleral icterus.  RESPIRATORY: Equal chest rise.   MUSCULOSKELETAL: Warm and well-perfused, no cyanosis, clubbing  NEUROLOGIC: Cranial nerves are grossly intact. Alert, oriented to person, place and time, answers questions appropriately.  INTEGUMENTARY: No rashes or jaundice.  GAIT: Steady, does not use assistive device        LABS:   Latest Reference Range & Units 03/15/24 12:00   Sodium 135 - 145 mmol/L 138   Potassium 3.4 - 5.3 mmol/L 4.1   Chloride 98 - 107 mmol/L 101   Carbon Dioxide (CO2) 22 - 29 mmol/L 23   Urea Nitrogen 8.0 - 23.0 mg/dL 13.9   Creatinine 0.51 - 0.95 mg/dL 0.70   GFR Estimate >60 mL/min/1.73m2 >90   Calcium 8.8 - 10.2 mg/dL 9.9   Anion Gap 7 - 15 mmol/L 14   Albumin 3.5 - 5.2 g/dL 4.7   Protein Total 6.4 - 8.3 g/dL 8.4 (H)   Alkaline Phosphatase 40 - 150 U/L 95   ALT 0 - 50 U/L 15   AST 0 - 45 U/L 19   Bilirubin Total <=1.2 mg/dL 0.5   Copper 80.0 - 155.0 ug/dL 129.0   RBC FOLATE  Rpt   Glucose 70 - 99 mg/dL 104 (H) "   Hemoglobin A1C 0.0 - 5.6 % 5.6   TSH 0.30 - 4.20 uIU/mL 1.07   Vitamin B12 232 - 1,245 pg/mL 548   Zinc 60.0 - 120.0 ug/dL 71.2   WBC 4.0 - 11.0 10e3/uL 7.9   Hemoglobin 11.7 - 15.7 g/dL 13.4   Hematocrit 35.0 - 47.0 % 40.8   Platelet Count 150 - 450 10e3/uL 280   RBC Count 3.80 - 5.20 10e6/uL 4.26   MCV 78 - 100 fL 96   MCH 26.5 - 33.0 pg 31.5   MCHC 31.5 - 36.5 g/dL 32.8   RDW 10.0 - 15.0 % 12.1   % Neutrophils % 55   % Lymphocytes % 34   % Monocytes % 9   % Eosinophils % 2   % Basophils % 0   Absolute Basophils 0.0 - 0.2 10e3/uL 0.0   Absolute Eosinophils 0.0 - 0.7 10e3/uL 0.1   Absolute Immature Granulocytes <=0.4 10e3/uL 0.0   Absolute Lymphocytes 0.8 - 5.3 10e3/uL 2.7   Absolute Monocytes 0.0 - 1.3 10e3/uL 0.7   % Immature Granulocytes % 0   Absolute Neutrophils 1.6 - 8.3 10e3/uL 4.4   Absolute NRBCs 10e3/uL 0.0   NRBCs per 100 WBC <1 /100 0   % Retic % 1.6   Absolute Retic 10e6/uL 0.068   LAB BLOOD MORPHOLOGY PATHOLOGIST REVIEW  Rpt   (H): Data is abnormally high  Rpt: View report in Results Review for more information    PATHOLOGY:  As above    IMAGING:  Narrative & Impression  EXAM: DX AXIAL HIPS/SPINE  LOCATION: [facility]  DATE: 4/15/2024     INDICATION: BMD screening, baseline.  DEMOGRAPHICS: Age- 64 years. Gender- Female. Menopausal status- Postmenopausal.  COMPARISON: No prior studies available on the current scanner.  TECHNIQUE: Dual-energy x-ray absorptiometry (DXA) performed with routine technique.     FINDINGS:     DXA RESULTS  -Lumbar Spine: L2-L3: BMD: 1.268 g/cm2. T-score: 0.6. Z-score: 1.8. L1 and L4 omitted from lumbar spine due to greater than 1.0 T-score difference from adjacent vertebrae. This is likely due to degenerative changes, which may artifactually increase BMD.  -RIGHT Hip Total: BMD: 1.009 g/cm2. T-score: 0.0. Z-score: 1.0.  -RIGHT Hip Femoral neck: BMD: 0.945 g/cm2. T-score: -0.7. Z-score: 0.6.  -LEFT Hip Total: BMD: 0.994 g/cm2. T-score: -0.1.  "Z-score: 0.9.  -LEFT Hip Femoral neck: BMD: 1.014 g/cm2. T-score: -0.2. Z-score: 1.1.     WHO T-SCORE CRITERIA  -Normal: T score at or above -1 SD  -Osteopenia: T score between -1 and -2.5 SD  -Osteoporosis: T score at or below -2.5 SD     The World Health Organization (WHO) criteria is applicable to perimenopausal females, postmenopausal females, and men aged 50 years or older.     FRACTURE RISK  -The FRAX risk calculator is not applicable due to normal bone mineral density.                                                                      IMPRESSION: NORMAL. Bone mineral density measurements are within normal limits using T score.    ASSESSMENT/PLAN:  Jinny Smith is a 64 year old female with:    # stage 2A pT2 pN0 (sn, 0/2) LEFT breast carcinoma, ER positive, CO positive, her2 negative on FISH   - 12/31/23 CT AP w/contrast for abd pain, N/V:  incidental finding \"breast mass in the left inferior breast at approximately the 6:00 position measuring 1.5 x 1.1 cm\"  - 1/31/24 Diagnostic bilateral breast mammogram, targeted left breast US:  Left breast, lower central posterior aspect, 6 oclock position, 3 cm from nipple, 1.2x1.1x1.9cm heterogenous round/oval mass and a few smaller adjacent round nodules- on US one seen 0.4cm away from main mass, 0.9x0.4x0.5cm. The subtle nodular opacities seen about the dominant mass cover an area of roughly 3.5 x 2.8 cm on the CC view. In the retro-areolar region, inferiorly, at or near skin, few benign appearing calcifications. No comment on LN  - 2/13/24 Ultrasound-guided LEFT breast core needle biopsy of lesion at 6 oclock, 3 cm from nipple, PATHOLOGY: Fragmented portions of carcinoma with focal solid papillary and mucinous features. Associated stroma with fibrosis, hemosiderin and acute hemorrhage suggesting the wall of a cyst.  ER (%, strong), CO (75%, moderate to strong). The findings in this biopsy confirm malignancy but the exact subclassification and the " distinction between in situ and invasive carcinoma cannot be determined due to the nature of the tumor cells, the presence of a possible cyst wall, and the disrupted nature of the biopsy fragments.  The differential diagnosis includes solid papillary carcinoma with mucinous features and a combination of both solid papillary carcinoma and mucinous carcinoma.   - 2/26/24 consult with Dr. Esquivel- recommended MRI breast to assess extent of disease and consider BCT, pt does not wish to do MRI, plan for mastectomy w/SLNBx and no reconstruction on 3/27/24  - 3/27/24 wire localized left breast lumpectomy with left axillary SLNBx with Dr. Esquivel, PATHOLOGY: lumpectomy: solid papillary carcinoma w/mucinous carcinoma, mucinous carcinoma: single focus 3.0x2.7x1.7cm, grade 1,  solid papillary carcinoma (95%) and DCIS- grade 2, 4.5cm; LVI negative, dermal LVI negative; left axillary SLNBx negative (0/2), one intramammary LN negative; margins positive- invasive carcinoma- anterior margin (at margin); DCIS present at margin (multifocal); ER positive (%), NM positive (%), her2 2+ on IHC, FISH negative, AJCC 8th edition: stage 2A pT2 pN0 (sn, 0/2)    - 1/24 CBC , CMP WNL, lipid panel: total cholesterol 243, , , HDL 47  - 3/24 CBC WNL, CMP total protein 8.4, albumin 4.7, hgbA1c 5.6    - 4/24 DEXA normal     PLAN:  - follow up with Dr. Esquivel-pt wants to proceed with left breast mastectomy. She is considering bilateral mastectomy because she is nervous about cancer recurrence and how unilateral mastectomy will appear. I have reached out to Dr. Esquivel about this. I have also reached out to our genetics team regarding expediting testing as genetic testing could alter recommendations regarding prophylactic mastectomy.   - oncotype requested 4/8/24, pending results  - regardless of the above, pt will need at least 5 years of adjuvant endocrine therapy. Will discuss details at next visit   - of note, pt  has htn, hld, s/p hysterectomy + BSO in 1990s for dysplasia   - continue calcium and vitamin D OTC  - repeat DEXA 4/2026    #macrocytosis w/o anemia  - 1/24 CBC hgb 11.4->12.1 w/-102  - 3/24 labs  CBC hgb 13.4, MCV 96  B12 548, RBC folate 705, Copper normal, Zinc normal  Absolute retic 0.068  TSH 1.07    PLAN:  - spontaneously resolved w/o intervention    #hld  - on atorvastatin     #left adrenal nodule  - 1/24 CT A w/o contrast: Technically indeterminate 10 mm left adrenal nodule. Recommend correlation with adrenal function studies. Consider follow-up renal MRI for further characterization if clinically indicated. Otherwise, consider six-month follow-up CT to assess for stability.  - pt does not want to do MRI, planning on repeat CT w/PCP 7/24    #family hx of cancer  - mother- breast cancer, paternal 1/2 aunt- breast cancer, details unknown; father colon cancer in his 50s  - genetic counseling referral  - labs completed today  - see above      RTC 4 weeks for follow up with me (after surgery completed and oncotype is back)      Xavier Alvarez DO  Hematology/Oncology  [facility]      Again, thank you for allowing me to participate in the care of your patient.        Sincerely,        XAVIER ALVAREZ DO

## 2024-04-16 NOTE — PROGRESS NOTES
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 Pt is s/p hysterectomy and possible BSO in 1980s for ovarian cysts and reported dysplasia ?location.     - 12/31/23 CT AP w/contrast for abd pain, N/V:   incidental finding \"breast mass in the left inferior breast at approximately the 6:00 position measuring 1.5 x 1.1 cm\"  - 1/31/24 Diagnostic bilateral breast mammogram:  Left breast, lower central posterior aspect, round mass and a few smaller adjacent round nodules. In the retro-areolar region, inferiorly, at or near skin, few benign appearing calcifications  - 1/31/24 Targeted LEFT breast ultrasound:  Left breast, In the 6:00 position 3 cm from the nipple there is a 12 x 11 x 19 mm oval heterogeneous mass that correlates with the largest mass seen on previous mammogram. The surrounding tissue is heterogeneous. Only one definitive adjacent nodule is seen.  This is approximately 4 mm away from the main mass and is bilobed and measures 9 x 4 x 5 mm.  The subtle nodular opacities seen about the dominant mass cover an area of roughly 3.5 x 2.8 cm on the CC view. No other definable masses are seen.  - 2/13/24 Ultrasound-guided LEFT breast core needle biopsy of lesion at 6 oclock, 3 cm from nipple  PATHOLOGY  A. Left breast mass, ultrasound-guided core biopsy:   -Fragmented portions of carcinoma with focal solid papillary and mucinous " features.  -Associated stroma with fibrosis, hemosiderin and acute hemorrhage suggesting the wall of a cyst.    -Surgical excision is needed for definitive diagnosis and subclassification (see Comment).  The neoplastic cells are strongly and diffusely positive for estrogen receptor (91 to 100%). The progesterone receptor is also positive (moderate to strong staining, approximately 75%). Immunohistochemistry for p63 shows scattered positive cells within both the solid and cribriform areas.       Comment    The findings in this biopsy confirm malignancy but the exact subclassification and the distinction between in situ and invasive carcinoma cannot be determined due to the nature of the tumor cells, the presence of a possible cyst wall, and the disrupted nature of the biopsy fragments.  The differential diagnosis includes solid papillary carcinoma with mucinous features and a combination of both solid papillary carcinoma and mucinous carcinoma.  Complete surgical excision is needed for definitive diagnosis.  The complex left breast ultrasound findings are also noted.     - 2/26/24 consult with Dr. Esquivel- recommended MRI breast to assess extent of disease and consider BCT, pt does not wish to do MRI, plan for mastectomy w/SLNBx and no reconstruction on 3/27/24    Lifetime estrogen exposure:  Menarche: 10  Last menstrual period: in her late 20s, 1980s  Age of first pregnancy: 19 y/o  Number of pregnancies: 1  Weight gain: 20lbs in last year  Exposure to exogenous estrogen: OCPs 2 years, estrogen after hysterectomy x10-15 years     Family history of:  1.  Breast cancer including male breast cancer: mother- breast, lumpectomy in her early 70s, RT; paternal 1/2 aunt- breast cancer, details unknown   2. Ovarian cancer: negative  3.  Pancreatic cancer: negative  4.  Prostate cancer: negative  5. Diffuse gastric cancer (if lobular breast CA): negative  6. Uterine cancer: negative  7. Colon cancer: father- dx in his late  50s,  age 66     Patient denies the following:  History of DVT/PE/VTE for self or family   Cataracts  Severe arthralgias and myalgias  Cardiovascular risk factors including diabetes  Osteoporosis  Current use of antidepressants    Pt has htn, hld. Pt is s/p hysterectomy and possible BSO in  for ovarian cysts and reported dysplasia ?location.     Regarding adrenal nodule:  - 23 CT AP: Indeterminant 1.1 cm left adrenal nodule   - 24 CT Abdomen w/wo contrast: A small round previously seen left adrenal nodule  measures 10 mm and demonstrates an average density of 24 Hounsfield units on unenhanced images, 180 Hounsfield units on early postcontrast imaging, and 67 Hounsfield units on 15 minute delayed postcontrast imaging, which results in an absolute washout of 48.8% (absolute washout less than 60% is indeterminant) and a relative washout of 38% (relative washout less than 40% is indeterminate)  IMPRESSION:   1.  Technically indeterminate 10 mm left adrenal nodule. Recommend correlation with adrenal function studies. Consider follow-up renal MRI for further characterization if clinically indicated. Otherwise, consider six-month follow-up CT to assess for stability       Pt has alcohol a few times a week, prior to that 2 drinks, 4x/week, vodka.     INTERIM HISTORY:  Pt was unable to tolerate MRI with premedications w/ativan, had possible rash related to that. She did not wish to reattempt MRI with different sedation.     - 3/27/24 left breast lumpectomy with left axillary SLNBx with Dr. Esquivel  PATHOLOGY  A.  Lymph node, left axillary sentinel #[id number], excisional biopsy:  -One lymph node, negative for metastatic carcinoma (0/1).     B.  Lymph node, left axillary sentinel #[id number] [id number], excisional biopsy:  -One lymph node, negative for metastatic carcinoma (0/1).     C.  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  -Single focus,  [30.0] mm in greatest size (3x2.7x1.7cm) (calculation as follows: Mucinous carcinoma spans six contiguous slices, # =3.0 cm ); additional dimensions: 2.7 x 1.7 cm.  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  -Carcinoma in-situ spans nine contiguous slices, # 5=13= approximately 4.5 cm.  - LVI negative, dermal LVI negative  -Previous biopsy site identified.  -Skeletal muscle is identified and uninvolved by tumor.  -One intramammary lymph node, negative for metastatic carcinoma (0/1).  -margin:    MARGINS   Margin Status for Invasive Carcinoma  Invasive carcinoma present at margin   Margin(s) Involved by Invasive Carcinoma  Anterior: 0.1 mm extent   Distance from Invasive Carcinoma to Anterior Margin  at margin mm   Distance from Invasive Carcinoma to Posterior Margin  Greater than: 10 mm   Distance from Invasive Carcinoma to Superior Margin  8.2 mm   Distance from Invasive Carcinoma to Inferior Margin  7 mm   Distance from Invasive Carcinoma to Medial Margin  Greater than: 10 mm   Distance from Invasive Carcinoma to Lateral Margin  Greater than: 10 mm   Margin Status for DCIS  DCIS present at margin   Margin(s) Involved by DCIS  Superior: 1 mm in extent     Lateral: 0.8 mm in extent   Distance from DCIS to Anterior Margin  Less than: 1 mm   Distance from DCIS to Posterior Margin  Greater than: 10 mm   Distance from DCIS to Superior Margin  at margin mm   Distance from DCIS to Inferior Margin  5 mm   Distance from DCIS to Medial Margin  Greater than: 10 mm   Distance from DCIS to Lateral Margin  at margin mm     REGIONAL LYMPH NODES   Regional Lymph Node Status  All regional lymph nodes negative for tumor   Total Number of Lymph Nodes Examined (sentinel and non-sentinel)  3   Number of [place] Nodes Examined  2   - ER  positive (%), UT positive (%), her2 2+ on IHC, FISH negative  -Additional findings: Flor[place] usual duct hyperplasia, duct ectasia, fibrocystic change, and fibroadenomas.  AJCC 8th edition: pT2 pN0 (sn, 0/2)    REVIEW OF SYSTEMS:   A 14 point ROS was reviewed with pertinent positives and negatives in the HPI.        HOME MEDICATIONS:  Current Outpatient Medications   Medication Sig Dispense Refill    atorvastatin (LIPITOR) 20 MG tablet Take 1 tablet (20 mg) by mouth daily 90 tablet 3    lisinopril (ZESTRIL) 20 MG tablet Take 2 tablets (40 mg) by mouth daily 180 tablet 2         ALLERGIES:  No Known Allergies      PAST MEDICAL HISTORY:  Past Medical History:   Diagnosis Date    Family history of colon cancer     Hyperlipidemia LDL goal <130 2012    Hyperplastic colonic polyp     Hypertension goal BP (blood pressure) < 140/80 2012    Knee joint effusion 2012         PAST SURGICAL HISTORY:  Past Surgical History:   Procedure Laterality Date    GYN SURGERY      HYSTERECTOMY TOTAL ABDOMINAL      LAPAROSCOPY DIAGNOSTIC (GENERAL) N/A 2024    Procedure: LAPAROSCOPY, DIAGNOSTIC, laparoscopic lysis of adhesions;  Surgeon: Philippe Esquivel MD;  Location: [facility] OR    LAPAROTOMY, LYSIS ADHESIONS, COMBINED N/A 2024    Procedure: LAPAROTOMY, EXPLORATORY, WITH open LYSIS OF ADHESIONS, reduction of internal hernia;  Surgeon: Philippe Esquivel MD;  Location: [facility] OR         SOCIAL HISTORY:  Social History     Socioeconomic History    Marital status:      Spouse name: Not on file    Number of children: Not on file    Years of education: Not on file    Highest education level: Not on file   Occupational History    Not on file   Tobacco Use    Smoking status: Former     Packs/day: .5     Types: Cigarettes     Quit date: 2023     Years since quittin.2    Smokeless tobacco: Former     Quit date: 2013   Vaping Use    Vaping Use: Never used   Substance and Sexual Activity    Alcohol use: Yes     Drug use: No    Sexual activity: Yes     Partners: Male   Other Topics Concern    Parent/sibling w/ CABG, MI or angioplasty before 65F 55M? Yes   Social History Narrative    Not on file     Social Determinants of Health     Financial Resource Strain: Low Risk  (2/27/2024)    Financial Resource Strain     Within the past 12 months, have you or your family members you live with been unable to get utilities (heat, electricity) when it was really needed?: No   Food Insecurity: Low Risk  (2/27/2024)    Food Insecurity     Within the past 12 months, did you worry that your food would run out before you got money to buy more?: No     Within the past 12 months, did the food you bought just not last and you didn't have money to get more?: No   Transportation Needs: Low Risk  (2/27/2024)    Transportation Needs     Within the past 12 months, has lack of transportation kept you from medical appointments, getting your medicines, non-medical meetings or appointments, work, or from getting things that you need?: No   Physical Activity: Unknown (2/27/2024)    Exercise Vital Sign     Days of Exercise per Week: 2 days     Minutes of Exercise per Session: Patient declined   Stress: No Stress Concern Present (2/27/2024)    [employer] - Occupational Stress Questionnaire     Feeling of Stress : Only a little   Social Connections: Unknown (2/27/2024)    Social Connection and Isolation Panel [NHANES]     Frequency of Communication with Friends and Family: Not on file     Frequency of Social Gatherings with Friends and Family: Once a week     Attends [employer]ism Services: Not on file     Active Member of Clubs or Organizations: Not on file     Attends Club or Organization Meetings: Not on file     Marital Status: Not on file   Interpersonal Safety: Low Risk  (2/27/2024)    Interpersonal Safety     Do you feel physically and emotionally safe where you currently live?: Yes     Within the past 12 months, have  "you been hit, slapped, kicked or otherwise physically hurt by someone?: No     Within the past 12 months, have you been humiliated or emotionally abused in other ways by your partner or ex-partner?: No   Housing Stability: Low Risk  (2/27/2024)    Housing Stability     Do you have housing? : Yes     Are you worried about losing your housing?: No         FAMILY HISTORY:  No family history on file.      PHYSICAL EXAM:  Vital signs:  BP (!) 148/88   Temp 98.5  F (36.9  C) (Temporal)   Ht 1.6 m (5' 3\")   Wt 76.2 kg (168 lb)   BMI 29.76 kg/m           GENERAL/CONSTITUTIONAL: No acute distress.  EYES: Pupils are equal and round. Extraocular movements intact without nystagmus.  No scleral icterus.  RESPIRATORY: Equal chest rise.   MUSCULOSKELETAL: Warm and well-perfused, no cyanosis, clubbing  NEUROLOGIC: Cranial nerves are grossly intact. Alert, oriented to person, place and time, answers questions appropriately.  INTEGUMENTARY: No rashes or jaundice.  GAIT: Steady, does not use assistive device        LABS:   Latest Reference Range & Units 03/15/24 12:00   Sodium 135 - 145 mmol/L 138   Potassium 3.4 - 5.3 mmol/L 4.1   Chloride 98 - 107 mmol/L 101   Carbon Dioxide (CO2) 22 - 29 mmol/L 23   Urea Nitrogen 8.0 - 23.0 mg/dL 13.9   Creatinine 0.51 - 0.95 mg/dL 0.70   GFR Estimate >60 mL/min/1.73m2 >90   Calcium 8.8 - 10.2 mg/dL 9.9   Anion Gap 7 - 15 mmol/L 14   Albumin 3.5 - 5.2 g/dL 4.7   Protein Total 6.4 - 8.3 g/dL 8.4 (H)   Alkaline Phosphatase 40 - 150 U/L 95   ALT 0 - 50 U/L 15   AST 0 - 45 U/L 19   Bilirubin Total <=1.2 mg/dL 0.5   Copper 80.0 - 155.0 ug/dL 129.0   RBC FOLATE  Rpt   Glucose 70 - 99 mg/dL 104 (H)   Hemoglobin A1C 0.0 - 5.6 % 5.6   TSH 0.30 - 4.20 uIU/mL 1.07   Vitamin B12 232 - 1,245 pg/mL 548   Zinc 60.0 - 120.0 ug/dL 71.2   WBC 4.0 - 11.0 10e3/uL 7.9   Hemoglobin 11.7 - 15.7 g/dL 13.4   Hematocrit 35.0 - 47.0 % 40.8   Platelet Count 150 - 450 10e3/uL 280   RBC Count 3.80 - 5.20 10e6/uL 4.26   MCV " 78 - 100 fL 96   MCH 26.5 - 33.0 pg 31.5   MCHC 31.5 - 36.5 g/dL 32.8   RDW 10.0 - 15.0 % 12.1   % Neutrophils % 55   % Lymphocytes % 34   % Monocytes % 9   % Eosinophils % 2   % Basophils % 0   Absolute Basophils 0.0 - 0.2 10e3/uL 0.0   Absolute Eosinophils 0.0 - 0.7 10e3/uL 0.1   Absolute Immature Granulocytes <=0.4 10e3/uL 0.0   Absolute Lymphocytes 0.8 - 5.3 10e3/uL 2.7   Absolute Monocytes 0.0 - 1.3 10e3/uL 0.7   % Immature Granulocytes % 0   Absolute Neutrophils 1.6 - 8.3 10e3/uL 4.4   Absolute NRBCs 10e3/uL 0.0   NRBCs per 100 WBC <1 /100 0   % Retic % 1.6   Absolute Retic 10e6/uL 0.068   LAB BLOOD MORPHOLOGY PATHOLOGIST REVIEW  Rpt   (H): Data is abnormally high  Rpt: View report in Results Review for more information    PATHOLOGY:  As above    IMAGING:  Narrative & Impression  EXAM: DX AXIAL HIPS/SPINE  LOCATION: [facility]  DATE: 4/15/2024     INDICATION: BMD screening, baseline.  DEMOGRAPHICS: Age- 64 years. Gender- Female. Menopausal status- Postmenopausal.  COMPARISON: No prior studies available on the current scanner.  TECHNIQUE: Dual-energy x-ray absorptiometry (DXA) performed with routine technique.     FINDINGS:     DXA RESULTS  -Lumbar Spine: L2-L3: BMD: 1.268 g/cm2. T-score: 0.6. Z-score: 1.8. L1 and L4 omitted from lumbar spine due to greater than 1.0 T-score difference from adjacent vertebrae. This is likely due to degenerative changes, which may artifactually increase BMD.  -RIGHT Hip Total: BMD: 1.009 g/cm2. T-score: 0.0. Z-score: 1.0.  -RIGHT Hip Femoral neck: BMD: 0.945 g/cm2. T-score: -0.7. Z-score: 0.6.  -LEFT Hip Total: BMD: 0.994 g/cm2. T-score: -0.1. Z-score: 0.9.  -LEFT Hip Femoral neck: BMD: 1.014 g/cm2. T-score: -0.2. Z-score: 1.1.     WHO T-SCORE CRITERIA  -Normal: T score at or above -1 SD  -Osteopenia: T score between -1 and -2.5 SD  -Osteoporosis: T score at or below -2.5 SD     The World Health Organization (WHO) criteria is applicable to  "perimenopausal females, postmenopausal females, and men aged 50 years or older.     FRACTURE RISK  -The FRAX risk calculator is not applicable due to normal bone mineral density.                                                                      IMPRESSION: NORMAL. Bone mineral density measurements are within normal limits using T score.    ASSESSMENT/PLAN:  Jinny Smith is a 64 year old female with:    # stage 2A pT2 pN0 (sn, 0/2) LEFT breast carcinoma, ER positive, WI positive, her2 negative on FISH   - 12/31/23 CT AP w/contrast for abd pain, N/V:  incidental finding \"breast mass in the left inferior breast at approximately the 6:00 position measuring 1.5 x 1.1 cm\"  - 1/31/24 Diagnostic bilateral breast mammogram, targeted left breast US:  Left breast, lower central posterior aspect, 6 oclock position, 3 cm from nipple, 1.2x1.1x1.9cm heterogenous round/oval mass and a few smaller adjacent round nodules- on US one seen 0.4cm away from main mass, 0.9x0.4x0.5cm. The subtle nodular opacities seen about the dominant mass cover an area of roughly 3.5 x 2.8 cm on the CC view. In the retro-areolar region, inferiorly, at or near skin, few benign appearing calcifications. No comment on LN  - 2/13/24 Ultrasound-guided LEFT breast core needle biopsy of lesion at 6 oclock, 3 cm from nipple, PATHOLOGY: Fragmented portions of carcinoma with focal solid papillary and mucinous features. Associated stroma with fibrosis, hemosiderin and acute hemorrhage suggesting the wall of a cyst.  ER (%, strong), WI (75%, moderate to strong). The findings in this biopsy confirm malignancy but the exact subclassification and the distinction between in situ and invasive carcinoma cannot be determined due to the nature of the tumor cells, the presence of a possible cyst wall, and the disrupted nature of the biopsy fragments.  The differential diagnosis includes solid papillary carcinoma with mucinous features and a combination of both " solid papillary carcinoma and mucinous carcinoma.   - 2/26/24 consult with Dr. Esquivel- recommended MRI breast to assess extent of disease and consider BCT, pt does not wish to do MRI, plan for mastectomy w/SLNBx and no reconstruction on 3/27/24  - 3/27/24 wire localized left breast lumpectomy with left axillary SLNBx with Dr. Esquivel, PATHOLOGY: lumpectomy: solid papillary carcinoma w/mucinous carcinoma, mucinous carcinoma: single focus 3.0x2.7x1.7cm, grade 1,  solid papillary carcinoma (95%) and DCIS- grade 2, 4.5cm; LVI negative, dermal LVI negative; left axillary SLNBx negative (0/2), one intramammary LN negative; margins positive- invasive carcinoma- anterior margin (at margin); DCIS present at margin (multifocal); ER positive (%), PA positive (%), her2 2+ on IHC, FISH negative, AJCC 8th edition: stage 2A pT2 pN0 (sn, 0/2)    - 1/24 CBC , CMP WNL, lipid panel: total cholesterol 243, , , HDL 47  - 3/24 CBC WNL, CMP total protein 8.4, albumin 4.7, hgbA1c 5.6    - 4/24 DEXA normal     PLAN:  - follow up with Dr. Esquivel-pt wants to proceed with left breast mastectomy. She is considering bilateral mastectomy because she is nervous about cancer recurrence and how unilateral mastectomy will appear. I have reached out to Dr. Esquivel about this. I have also reached out to our genetics team regarding expediting testing as genetic testing could alter recommendations regarding prophylactic mastectomy.   - oncotype requested 4/8/24, pending results  - regardless of the above, pt will need at least 5 years of adjuvant endocrine therapy. Will discuss details at next visit   - of note, pt has htn, hld, s/p hysterectomy + BSO in 1990s for dysplasia   - continue calcium and vitamin D OTC  - repeat DEXA 4/2026    #macrocytosis w/o anemia  - 1/24 CBC hgb 11.4->12.1 w/-102  - 3/24 labs  CBC hgb 13.4, MCV 96  B12 548, RBC folate 705, Copper normal, Zinc normal  Absolute retic 0.068  TSH  1.07    PLAN:  - spontaneously resolved w/o intervention    #hld  - on atorvastatin     #left adrenal nodule  - 1/24 CT A w/o contrast: Technically indeterminate 10 mm left adrenal nodule. Recommend correlation with adrenal function studies. Consider follow-up renal MRI for further characterization if clinically indicated. Otherwise, consider six-month follow-up CT to assess for stability.  - pt does not want to do MRI, planning on repeat CT w/PCP 7/24    #family hx of cancer  - mother- breast cancer, paternal 1/2 aunt- breast cancer, details unknown; father colon cancer in his 50s  - genetic counseling referral  - labs completed today  - see above      RTC 4 weeks for follow up with me (after surgery completed and oncotype is back)      Sweta Brand DO  Hematology/Oncology  [facility]

## 2024-04-18 ENCOUNTER — OFFICE VISIT (OUTPATIENT)
Dept: SURGERY | Facility: CLINIC | Age: 64
End: 2024-04-18
Payer: MEDICARE

## 2024-04-18 ENCOUNTER — PATIENT OUTREACH (OUTPATIENT)
Dept: ONCOLOGY | Facility: CLINIC | Age: 64
End: 2024-04-18

## 2024-04-18 VITALS
DIASTOLIC BLOOD PRESSURE: 88 MMHG | SYSTOLIC BLOOD PRESSURE: 142 MMHG | WEIGHT: 168 LBS | BODY MASS INDEX: 29.77 KG/M2 | TEMPERATURE: 96.7 F | HEIGHT: 63 IN

## 2024-04-18 DIAGNOSIS — C50.112 MALIGNANT NEOPLASM OF CENTRAL PORTION OF LEFT FEMALE BREAST, UNSPECIFIED ESTROGEN RECEPTOR STATUS (H): ICD-10-CM

## 2024-04-18 DIAGNOSIS — Z80.3 FHX: BREAST CANCER IN FIRST DEGREE RELATIVE: ICD-10-CM

## 2024-04-18 DIAGNOSIS — Z09 POSTOP CHECK: Primary | ICD-10-CM

## 2024-04-18 PROCEDURE — 99024 POSTOP FOLLOW-UP VISIT: CPT | Performed by: SPECIALIST

## 2024-04-18 ASSESSMENT — PAIN SCALES - GENERAL: PAINLEVEL: NO PAIN (0)

## 2024-04-18 NOTE — PROGRESS NOTES
Follow-up for left breast lumpectomy and sentinel node biopsy.      Subjective:  Patient is known to me for a left breast lumpectomy with a positive margin.  She discussed mastectomy versus reexcision of the cavity with her oncologist.  She wishes to pursue a mastectomy on that side.  She is also considering a prophylactic mastectomy on the right.  Her genetic testing is still currently pending.  She comes today to further discuss the options for treatment.  She is now unsure if she wants to do a prophylactic mastectomy on the right.  She states she changed her decision every day.  She has decided she does not want reconstruction.      Objective:  B/P: 142/88, T: 96.7, P: Data Unavailable, R: Data Unavailable  Left breast: Incisions healing well.  No signs of infection.      Assessment/plan:  This is 64-year-old lady with a left breast cancer.  She is considering a prophylactic mastectomy on the right.  However, she is unsure how she wishes to proceed.  I discussed the options of prophylactic mastectomy on the right along with a mastectomy on the left.  She is still unsure about the right breast prophylactic mastectomy all other days she is sure.  As she is indecisive about it I recommended we wait till the genetic testing to further guide her decision making.  She is agreeable to the left mastectomy.  Should she be high risk on genetic testing then she is agreeable to a right breast prophylactic mastectomy.  She will follow-up with me in 2 to 3 weeks when the genetic testing is available.    Philippe Esquivel MD, FACS

## 2024-04-18 NOTE — LETTER
4/18/2024         RE: Jinny Smith  [address]  [place] [facility] [place] [address]        Dear Colleague,    Thank you for referring your patient, Jinny Smith, to the [facility]. Please see a copy of my visit note below.    Follow-up for left breast lumpectomy and sentinel node biopsy.      Subjective:  Patient is known to me for a left breast lumpectomy with a positive margin.  She discussed mastectomy versus reexcision of the cavity with her oncologist.  She wishes to pursue a mastectomy on that side.  She is also considering a prophylactic mastectomy on the right.  Her genetic testing is still currently pending.  She comes today to further discuss the options for treatment.  She is now unsure if she wants to do a prophylactic mastectomy on the right.  She states she changed her decision every day.  She has decided she does not want reconstruction.      Objective:  B/P: 142/88, T: 96.7, P: Data Unavailable, R: Data Unavailable  Left breast: Incisions healing well.  No signs of infection.      Assessment/plan:  This is 64-year-old lady with a left breast cancer.  She is considering a prophylactic mastectomy on the right.  However, she is unsure how she wishes to proceed.  I discussed the options of prophylactic mastectomy on the right along with a mastectomy on the left.  She is still unsure about the right breast prophylactic mastectomy all other days she is sure.  As she is indecisive about it I recommended we wait till the genetic testing to further guide her decision making.  She is agreeable to the left mastectomy.  Should she be high risk on genetic testing then she is agreeable to a right breast prophylactic mastectomy.  She will follow-up with me in 2 to 3 weeks when the genetic testing is available.    Philippe Esquivel MD, FACS      Again, thank you for allowing me to participate in the care of your patient.        Sincerely,        Philippe Esquivel MD   [facility]

## 2024-04-19 ENCOUNTER — LAB (OUTPATIENT)
Dept: LAB | Facility: CLINIC | Age: 64
End: 2024-04-19
Payer: MEDICARE

## 2024-04-19 DIAGNOSIS — C50.112 MALIGNANT NEOPLASM OF CENTRAL PORTION OF LEFT FEMALE BREAST, UNSPECIFIED ESTROGEN RECEPTOR STATUS (H): Primary | ICD-10-CM

## 2024-04-19 PROCEDURE — 36415 COLL VENOUS BLD VENIPUNCTURE: CPT

## 2024-04-19 PROCEDURE — G0452 MOLECULAR PATHOLOGY INTERPR: HCPCS | Mod: 26 | Performed by: STUDENT IN AN ORGANIZED HEALTH CARE EDUCATION/TRAINING PROGRAM

## 2024-04-19 PROCEDURE — 81162 BRCA1&2 GEN FULL SEQ DUP/DEL: CPT | Performed by: INTERNAL MEDICINE

## 2024-05-08 ENCOUNTER — TELEPHONE (OUTPATIENT)
Dept: ONCOLOGY | Facility: CLINIC | Age: 64
End: 2024-05-08
Payer: MEDICARE

## 2024-05-08 LAB — INTERPRETATION: NORMAL

## 2024-05-09 NOTE — TELEPHONE ENCOUNTER
Spoke with patient regarding arranging a telephone visit to discuss Oncotype results. Patient states she would like to know score and speak with Dr. Brand before moving forward with surgery. Patient states she is planning to do a double mastectomy but she will talk to Dr. Esquivel about this. This RN offered telephone visit on 5/14 with Dr. Brand, patient expressed frustration needing to wait this long for a score. This RN used active listening and provided reassurance timeframe is within reason for exact science to return results. This RN offered visit with Dr. Esquivel on 5/16/24, patient declined as she will make a call to the clinic to speak with provider to schedule or not. Dr. Brand agreeable to sharing over Chirply, sent message and will await response from patient to schedule.  Raina Jeter RNCC

## 2024-05-14 ENCOUNTER — VIRTUAL VISIT (OUTPATIENT)
Dept: ONCOLOGY | Facility: CLINIC | Age: 64
End: 2024-05-14
Payer: MEDICARE

## 2024-05-14 DIAGNOSIS — C50.112 MALIGNANT NEOPLASM OF CENTRAL PORTION OF LEFT FEMALE BREAST, UNSPECIFIED ESTROGEN RECEPTOR STATUS (H): Primary | ICD-10-CM

## 2024-05-14 PROCEDURE — 99441 PR PHYSICIAN TELEPHONE EVALUATION 5-10 MIN: CPT | Mod: 93 | Performed by: INTERNAL MEDICINE

## 2024-05-14 NOTE — LETTER
"    5/14/2024         RE: Jinny Smith  [address]  [facility] [address]        Dear Colleague,    Thank you for referring your patient, Jinny Smith, to the [facility]. Please see a copy of my visit note below.    Telephone-Visit Details     Telephone Start Time: 4:18PM     Type of service:  Telephone Visit     Telephone End Time: 4:23PM    Originating Location (pt. 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 Pt is s/p hysterectomy and possible BSO in 1980s for ovarian cysts and reported dysplasia ?location.     - 12/31/23 CT AP w/contrast for abd pain, N/V:   incidental finding \"breast mass in the left inferior breast at approximately the 6:00 position measuring 1.5 x 1.1 cm\"  - 1/31/24 Diagnostic bilateral breast mammogram:  Left breast, lower central posterior aspect, round mass and a few smaller adjacent round nodules. In the retro-areolar region, inferiorly, at or near skin, few benign appearing calcifications  - 1/31/24 Targeted LEFT breast ultrasound:  Left breast, In the 6:00 position 3 cm from the nipple there is a 12 x 11 x 19 mm oval heterogeneous mass that correlates with the largest mass seen on previous mammogram. The surrounding tissue is heterogeneous. Only " one definitive adjacent nodule is seen.  This is approximately 4 mm away from the main mass and is bilobed and measures 9 x 4 x 5 mm.  The subtle nodular opacities seen about the dominant mass cover an area of roughly 3.5 x 2.8 cm on the CC view. No other definable masses are seen.  - 2/13/24 Ultrasound-guided LEFT breast core needle biopsy of lesion at 6 oclock, 3 cm from nipple  PATHOLOGY  A. Left breast mass, ultrasound-guided core biopsy:   -Fragmented portions of carcinoma with focal solid papillary and mucinous features.  -Associated stroma with fibrosis, hemosiderin and acute hemorrhage suggesting the wall of a cyst.    -Surgical excision is needed for definitive diagnosis and subclassification (see Comment).  The neoplastic cells are strongly and diffusely positive for estrogen receptor (91 to 100%). The progesterone receptor is also positive (moderate to strong staining, approximately 75%). Immunohistochemistry for p63 shows scattered positive cells within both the solid and cribriform areas.       Comment    The findings in this biopsy confirm malignancy but the exact subclassification and the distinction between in situ and invasive carcinoma cannot be determined due to the nature of the tumor cells, the presence of a possible cyst wall, and the disrupted nature of the biopsy fragments.  The differential diagnosis includes solid papillary carcinoma with mucinous features and a combination of both solid papillary carcinoma and mucinous carcinoma.  Complete surgical excision is needed for definitive diagnosis.  The complex left breast ultrasound findings are also noted.     - 2/26/24 consult with Dr. Esquivel- recommended MRI breast to assess extent of disease and consider BCT, pt does not wish to do MRI, plan for mastectomy w/SLNBx and no reconstruction on 3/27/24    Lifetime estrogen exposure:  Menarche: 10  Last menstrual period: in her late 20s, 1980s  Age of first pregnancy: 17 y/o  Number of  pregnancies: 1  Weight gain: 20lbs in last year  Exposure to exogenous estrogen: OCPs 2 years, estrogen after hysterectomy x10-15 years     Family history of:  1.  Breast cancer including male breast cancer: mother- breast, lumpectomy in her early 70s, RT; paternal 1/2 aunt- breast cancer, details unknown   2. Ovarian cancer: negative  3.  Pancreatic cancer: negative  4.  Prostate cancer: negative  5. Diffuse gastric cancer (if lobular breast CA): negative  6. Uterine cancer: negative  7. Colon cancer: father- dx in his late 50s,  age 66     Patient denies the following:  History of DVT/PE/VTE for self or family   Cataracts  Severe arthralgias and myalgias  Cardiovascular risk factors including diabetes  Osteoporosis  Current use of antidepressants    Pt has htn, hld. Pt is s/p hysterectomy and possible BSO in  for ovarian cysts and reported dysplasia ?location.     Regarding adrenal nodule:  - 23 CT AP: Indeterminant 1.1 cm left adrenal nodule   - 24 CT Abdomen w/wo contrast: A small round previously seen left adrenal nodule  measures 10 mm and demonstrates an average density of 24 Hounsfield units on unenhanced images, 180 Hounsfield units on early postcontrast imaging, and 67 Hounsfield units on 15 minute delayed postcontrast imaging, which results in an absolute washout of 48.8% (absolute washout less than 60% is indeterminant) and a relative washout of 38% (relative washout less than 40% is indeterminate)  IMPRESSION:   1.  Technically indeterminate 10 mm left adrenal nodule. Recommend correlation with adrenal function studies. Consider follow-up renal MRI for further characterization if clinically indicated. Otherwise, consider six-month follow-up CT to assess for stability       Pt has alcohol a few times a week, prior to that 2 drinks, 4x/week, vodka.     Pt was unable to tolerate MRI with premedications w/ativan, had possible rash related to that. She did not wish to reattempt MRI with  different sedation.     - 3/27/24 left breast lumpectomy with left axillary SLNBx with Dr. Esquivel  PATHOLOGY  A.  Lymph node, left axillary sentinel #[id number], excisional biopsy:  -One lymph node, negative for metastatic carcinoma (0/1).     B.  Lymph node, left axillary sentinel #2 -2045, excisional biopsy:  -One lymph node, negative for metastatic carcinoma (0/1).     C.  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  -Single focus,  [30.0] mm in greatest size (3x2.7x1.7cm) (calculation as follows: Mucinous carcinoma spans six contiguous slices, # =3.0 cm ); additional dimensions: 2.7 x 1.7 cm.  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  -Carcinoma in-situ spans nine contiguous slices, # 5=13= approximately 4.5 cm.  - LVI negative, dermal LVI negative  -Previous biopsy site identified.  -Skeletal muscle is identified and uninvolved by tumor.  -One intramammary lymph node, negative for metastatic carcinoma (0/1).  -margin:    MARGINS   Margin Status for Invasive Carcinoma  Invasive carcinoma present at margin   Margin(s) Involved by Invasive Carcinoma  Anterior: 0.1 mm extent   Distance from Invasive Carcinoma to Anterior Margin  at margin mm   Distance from Invasive Carcinoma to Posterior Margin  Greater than: 10 mm   Distance from Invasive Carcinoma to Superior Margin  8.2 mm   Distance from Invasive Carcinoma to Inferior Margin  7 mm   Distance from Invasive Carcinoma to Medial Margin  Greater than: 10 mm   Distance from Invasive Carcinoma to Lateral Margin  Greater than: 10 mm   Margin Status for DCIS  DCIS present at margin   Margin(s) Involved by DCIS  Superior: 1 mm in extent     Lateral: 0.8 mm in extent   Distance from DCIS to Anterior  Margin  Less than: 1 mm   Distance from DCIS to Posterior Margin  Greater than: 10 mm   Distance from DCIS to Superior Margin  at margin mm   Distance from DCIS to Inferior Margin  5 mm   Distance from DCIS to Medial Margin  Greater than: 10 mm   Distance from DCIS to Lateral Margin  at margin mm     REGIONAL LYMPH NODES   Regional Lymph Node Status  All regional lymph nodes negative for tumor   Total Number of Lymph Nodes Examined (sentinel and non-sentinel)  3   Number of [place] Nodes Examined  2   - ER positive (%), ID positive (%), her2 2+ on IHC, FISH negative  -Additional findings: [place] usual duct hyperplasia, duct ectasia, fibrocystic change, and fibroadenomas.  AJCC 8th edition: pT2 pN0 (sn, 0/2)      INTERIM HISTORY:    Oncotype DX breast recurrence score report:  Recurrence score result: 11  Distant recurrence risk at 9 years with adjuvant endocrine therapy alone: 3%  Group average absolute chemotherapy benefit: <1%    REVIEW OF SYSTEMS:   A 14 point ROS was reviewed with pertinent positives and negatives in the HPI.        HOME MEDICATIONS:  Current Outpatient Medications   Medication Sig Dispense Refill     atorvastatin (LIPITOR) 20 MG tablet Take 1 tablet (20 mg) by mouth daily 90 tablet 3     lisinopril (ZESTRIL) 20 MG tablet Take 2 tablets (40 mg) by mouth daily 180 tablet 2         ALLERGIES:  No Known Allergies      PAST MEDICAL HISTORY:  Past Medical History:   Diagnosis Date     Family history of colon cancer      Hyperlipidemia LDL goal <130 8/28/2012     Hyperplastic colonic polyp      Hypertension goal BP (blood pressure) < 140/80 8/28/2012     Knee joint effusion 8/28/2012         PAST SURGICAL HISTORY:  Past Surgical History:   Procedure Laterality Date     GYN SURGERY       HYSTERECTOMY TOTAL ABDOMINAL       LAPAROSCOPY DIAGNOSTIC (GENERAL) N/A 1/2/2024    Procedure: LAPAROSCOPY, DIAGNOSTIC, laparoscopic lysis of adhesions;  Surgeon: Philippe Esquivel MD;  Location:  OR      LAPAROTOMY, LYSIS ADHESIONS, COMBINED N/A 2024    Procedure: LAPAROTOMY, EXPLORATORY, WITH open LYSIS OF ADHESIONS, reduction of internal hernia;  Surgeon: Philippe Esquivel MD;  Location:  OR         SOCIAL HISTORY:  Social History     Socioeconomic History     Marital status:      Spouse name: Not on file     Number of children: Not on file     Years of education: Not on file     Highest education level: Not on file   Occupational History     Not on file   Tobacco Use     Smoking status: Former     Packs/day: .5     Types: Cigarettes     Quit date: 2023     Years since quittin.2     Smokeless tobacco: Former     Quit date: 2013   Vaping Use     Vaping Use: Never used   Substance and Sexual Activity     Alcohol use: Yes     Drug use: No     Sexual activity: Yes     Partners: Male   Other Topics Concern     Parent/sibling w/ CABG, MI or angioplasty before 65F 55M? Yes   Social History Narrative     Not on file     Social Determinants of Health     Financial Resource Strain: Low Risk  (2024)    Financial Resource Strain      Within the past 12 months, have you or your family members you live with been unable to get utilities (heat, electricity) when it was really needed?: No   Food Insecurity: Low Risk  (2024)    Food Insecurity      Within the past 12 months, did you worry that your food would run out before you got money to buy more?: No      Within the past 12 months, did the food you bought just not last and you didn't have money to get more?: No   Transportation Needs: Low Risk  (2024)    Transportation Needs      Within the past 12 months, has lack of transportation kept you from medical appointments, getting your medicines, non-medical meetings or appointments, work, or from getting things that you need?: No   Physical Activity: Unknown (2024)    Exercise Vital Sign      Days of Exercise per Week: 2 days      Minutes of Exercise per Session: Patient declined  "  Stress: No Stress Concern Present (2/27/2024)    [employer] - Occupational Stress Questionnaire      Feeling of Stress : Only a little   Social Connections: Unknown (2/27/2024)    Social Connection and Isolation Panel [NHANES]      Frequency of Communication with Friends and Family: Not on file      Frequency of Social Gatherings with Friends and Family: Once a week      Attends Hinduism Services: Not on file      Active Member of Clubs or Organizations: Not on file      Attends Club or Organization Meetings: Not on file      Marital Status: Not on file   Interpersonal Safety: Low Risk  (2/27/2024)    Interpersonal Safety      Do you feel physically and emotionally safe where you currently live?: Yes      Within the past 12 months, have you been hit, slapped, kicked or otherwise physically hurt by someone?: No      Within the past 12 months, have you been humiliated or emotionally abused in other ways by your partner or ex-partner?: No   Housing Stability: Low Risk  (2/27/2024)    Housing Stability      Do you have housing? : Yes      Are you worried about losing your housing?: No         FAMILY HISTORY:  No family history on file.      PHYSICAL EXAM:  Vital signs:  There were no vitals taken for this visit.       LABS:    PATHOLOGY:      IMAGING:                                                                     IMPRESSION: NORMAL. Bone mineral density measurements are within normal limits using T score.    ASSESSMENT/PLAN:  Jinny Smith is a 64 year old female with:    # stage 2A pT2 pN0 (sn, 0/2) LEFT breast carcinoma, ER positive, AK positive, her2 negative on FISH   - 12/31/23 CT AP w/contrast for abd pain, N/V:  incidental finding \"breast mass in the left inferior breast at approximately the 6:00 position measuring 1.5 x 1.1 cm\"  - 1/31/24 Diagnostic bilateral breast mammogram, targeted left breast US:  Left breast, lower central posterior aspect, 6 oclock position, 3 cm " from nipple, 1.2x1.1x1.9cm heterogenous round/oval mass and a few smaller adjacent round nodules- on US one seen 0.4cm away from main mass, 0.9x0.4x0.5cm. The subtle nodular opacities seen about the dominant mass cover an area of roughly 3.5 x 2.8 cm on the CC view. In the retro-areolar region, inferiorly, at or near skin, few benign appearing calcifications. No comment on LN  - 2/13/24 Ultrasound-guided LEFT breast core needle biopsy of lesion at 6 oclock, 3 cm from nipple, PATHOLOGY: Fragmented portions of carcinoma with focal solid papillary and mucinous features. Associated stroma with fibrosis, hemosiderin and acute hemorrhage suggesting the wall of a cyst.  ER (%, strong), MO (75%, moderate to strong). The findings in this biopsy confirm malignancy but the exact subclassification and the distinction between in situ and invasive carcinoma cannot be determined due to the nature of the tumor cells, the presence of a possible cyst wall, and the disrupted nature of the biopsy fragments.  The differential diagnosis includes solid papillary carcinoma with mucinous features and a combination of both solid papillary carcinoma and mucinous carcinoma.   - 2/26/24 consult with Dr. Esquivel- recommended MRI breast to assess extent of disease and consider BCT, pt does not wish to do MRI, plan for mastectomy w/SLNBx and no reconstruction on 3/27/24  - 3/27/24 wire localized left breast lumpectomy with left axillary SLNBx with Dr. Esquivel, PATHOLOGY: lumpectomy: solid papillary carcinoma w/mucinous carcinoma, mucinous carcinoma: single focus 3.0x2.7x1.7cm, grade 1,  solid papillary carcinoma (95%) and DCIS- grade 2, 4.5cm; LVI negative, dermal LVI negative; left axillary SLNBx negative (0/2), one intramammary LN negative; margins positive- invasive carcinoma- anterior margin (at margin); DCIS present at margin (multifocal); ER positive (%), MO positive (%), her2 2+ on IHC, FISH negative, AJCC 8th edition:  stage 2A pT2 pN0 (sn, 0/2)  - Oncotype DX breast recurrence score report:  Recurrence score result: 11  Distant recurrence risk at 9 years with adjuvant endocrine therapy alone: 3%  Group average absolute chemotherapy benefit: <1%    - 1/24 CBC , CMP WNL, lipid panel: total cholesterol 243, , , HDL 47  - 3/24 CBC WNL, CMP total protein 8.4, albumin 4.7, hgbA1c 5.6    - 4/24 DEXA normal     PLAN:  - oncotype discussed in detail today. Adjuvant chemotherapy is not recommended.   - follow up with Dr. Esquivel regarding additional surgery. Will follow for pathology to determine if adjuvant radiation is required.   - regardless of the above, pt will need at least 5 years of adjuvant endocrine therapy. Will discuss details at next visit  and plan to start 2 weeks after surgery/RT  - of note, pt has htn, hld, s/p hysterectomy + BSO in 1990s for dysplasia   - continue calcium and vitamin D OTC  - repeat DEXA 4/2026    #macrocytosis w/o anemia  - 1/24 CBC hgb 11.4->12.1 w/-102  - 3/24 labs  CBC hgb 13.4, MCV 96  B12 548, RBC folate 705, Copper normal, Zinc normal  Absolute retic 0.068  TSH 1.07    PLAN:  - spontaneously resolved w/o intervention    #hld  - on atorvastatin     #left adrenal nodule  - 1/24 CT A w/o contrast: Technically indeterminate 10 mm left adrenal nodule. Recommend correlation with adrenal function studies. Consider follow-up renal MRI for further characterization if clinically indicated. Otherwise, consider six-month follow-up CT to assess for stability.  - pt does not want to do MRI, planning on repeat CT w/PCP 7/24    #family hx of cancer  - mother- breast cancer, paternal 1/2 aunt- breast cancer, details unknown; father colon cancer in his 50s  - genetic counseling referral  - labs completed  - see above      RTC 8 weeks for follow up with me, labs prior to visit (to be adjusted to surgery date- follow up should be 2 weeks after surgery completed)      Sweta Brand  DO  Hematology/Oncology  [facility]      Again, thank you for allowing me to participate in the care of your patient.        Sincerely,        XAVIER ALVAREZ, DO

## 2024-05-14 NOTE — TELEPHONE ENCOUNTER
After meeting with Dr Brand today, Jinny has decided to go ahead with the surgery.     She says Dr Esquivel told her to let him know when she is ready and he will put the order in.     Please do this at your earliest convenience. Thank you!

## 2024-05-14 NOTE — LETTER
"    5/14/2024         RE: Jinny Smith  [address]  [facility] [id number]        Dear Colleague,    Thank you for referring your patient, Jinny Smith, to the [facility]. Please see a copy of my visit note below.    Telephone-Visit Details     Telephone Start Time: 4:18PM     Type of service:  Telephone Visit     Telephone End Time: 4:23PM    Originating Location (pt. 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 Pt is s/p hysterectomy and possible BSO in 1980s for ovarian cysts and reported dysplasia ?location.     - 12/31/23 CT AP w/contrast for abd pain, N/V:   incidental finding \"breast mass in the left inferior breast at approximately the 6:00 position measuring 1.5 x 1.1 cm\"  - 1/31/24 Diagnostic bilateral breast mammogram:  Left breast, lower central posterior aspect, round mass and a few smaller adjacent round nodules. In the retro-areolar region, inferiorly, at or near skin, few benign appearing calcifications  - 1/31/24 Targeted LEFT breast ultrasound:  Left breast, In the 6:00 position 3 cm from the nipple there is a 12 x 11 x 19 mm oval heterogeneous mass that correlates with the largest mass seen on previous mammogram. The surrounding tissue is heterogeneous. Only " one definitive adjacent nodule is seen.  This is approximately 4 mm away from the main mass and is bilobed and measures 9 x 4 x 5 mm.  The subtle nodular opacities seen about the dominant mass cover an area of roughly 3.5 x 2.8 cm on the CC view. No other definable masses are seen.  - 2/13/24 Ultrasound-guided LEFT breast core needle biopsy of lesion at 6 oclock, 3 cm from nipple  PATHOLOGY  A. Left breast mass, ultrasound-guided core biopsy:   -Fragmented portions of carcinoma with focal solid papillary and mucinous features.  -Associated stroma with fibrosis, hemosiderin and acute hemorrhage suggesting the wall of a cyst.    -Surgical excision is needed for definitive diagnosis and subclassification (see Comment).  The neoplastic cells are strongly and diffusely positive for estrogen receptor (91 to 100%). The progesterone receptor is also positive (moderate to strong staining, approximately 75%). Immunohistochemistry for p63 shows scattered positive cells within both the solid and cribriform areas.       Comment    The findings in this biopsy confirm malignancy but the exact subclassification and the distinction between in situ and invasive carcinoma cannot be determined due to the nature of the tumor cells, the presence of a possible cyst wall, and the disrupted nature of the biopsy fragments.  The differential diagnosis includes solid papillary carcinoma with mucinous features and a combination of both solid papillary carcinoma and mucinous carcinoma.  Complete surgical excision is needed for definitive diagnosis.  The complex left breast ultrasound findings are also noted.     - 2/26/24 consult with Dr. Esquivel- recommended MRI breast to assess extent of disease and consider BCT, pt does not wish to do MRI, plan for mastectomy w/SLNBx and no reconstruction on 3/27/24    Lifetime estrogen exposure:  Menarche: 10  Last menstrual period: in her late 20s, 1980s  Age of first pregnancy: 17 y/o  Number of  pregnancies: 1  Weight gain: 20lbs in last year  Exposure to exogenous estrogen: OCPs 2 years, estrogen after hysterectomy x10-15 years     Family history of:  1.  Breast cancer including male breast cancer: mother- breast, lumpectomy in her early 70s, RT; paternal 1/2 aunt- breast cancer, details unknown   2. Ovarian cancer: negative  3.  Pancreatic cancer: negative  4.  Prostate cancer: negative  5. Diffuse gastric cancer (if lobular breast CA): negative  6. Uterine cancer: negative  7. Colon cancer: father- dx in his late 50s,  age 66     Patient denies the following:  History of DVT/PE/VTE for self or family   Cataracts  Severe arthralgias and myalgias  Cardiovascular risk factors including diabetes  Osteoporosis  Current use of antidepressants    Pt has htn, hld. Pt is s/p hysterectomy and possible BSO in  for ovarian cysts and reported dysplasia ?location.     Regarding adrenal nodule:  - 23 CT AP: Indeterminant 1.1 cm left adrenal nodule   - 24 CT Abdomen w/wo contrast: A small round previously seen left adrenal nodule  measures 10 mm and demonstrates an average density of 24 Hounsfield units on unenhanced images, 180 Hounsfield units on early postcontrast imaging, and 67 Hounsfield units on 15 minute delayed postcontrast imaging, which results in an absolute washout of 48.8% (absolute washout less than 60% is indeterminant) and a relative washout of 38% (relative washout less than 40% is indeterminate)  IMPRESSION:   1.  Technically indeterminate 10 mm left adrenal nodule. Recommend correlation with adrenal function studies. Consider follow-up renal MRI for further characterization if clinically indicated. Otherwise, consider six-month follow-up CT to assess for stability       Pt has alcohol a few times a week, prior to that 2 drinks, 4x/week, vodka.     Pt was unable to tolerate MRI with premedications w/ativan, had possible rash related to that. She did not wish to reattempt MRI with  different sedation.     - 3/27/24 left breast lumpectomy with left axillary SLNBx with Dr. Esquivel  PATHOLOGY  A.  Lymph node, left axillary sentinel #[id number], excisional biopsy:  -One lymph node, negative for metastatic carcinoma (0/1).     B.  Lymph node, left axillary sentinel #2 -[id number], excisional biopsy:  -One lymph node, negative for metastatic carcinoma (0/1).     C.  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  -Single focus,  [30.0] mm in greatest size (3x2.7x1.7cm) (calculation as follows: Mucinous carcinoma spans six contiguous slices, # =3.0 cm ); additional dimensions: 2.7 x 1.7 cm.  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  -Carcinoma in-situ spans nine contiguous slices, # 5=13= approximately 4.5 cm.  - LVI negative, dermal LVI negative  -Previous biopsy site identified.  -Skeletal muscle is identified and uninvolved by tumor.  -One intramammary lymph node, negative for metastatic carcinoma (0/1).  -margin:    MARGINS   Margin Status for Invasive Carcinoma  Invasive carcinoma present at margin   Margin(s) Involved by Invasive Carcinoma  Anterior: 0.1 mm extent   Distance from Invasive Carcinoma to Anterior Margin  at margin mm   Distance from Invasive Carcinoma to Posterior Margin  Greater than: 10 mm   Distance from Invasive Carcinoma to Superior Margin  8.2 mm   Distance from Invasive Carcinoma to Inferior Margin  7 mm   Distance from Invasive Carcinoma to Medial Margin  Greater than: 10 mm   Distance from Invasive Carcinoma to Lateral Margin  Greater than: 10 mm   Margin Status for DCIS  DCIS present at margin   Margin(s) Involved by DCIS  Superior: 1 mm in extent     Lateral: 0.8 mm in extent   Distance from DCIS to Anterior  Margin  Less than: 1 mm   Distance from DCIS to Posterior Margin  Greater than: 10 mm   Distance from DCIS to Superior Margin  at margin mm   Distance from DCIS to Inferior Margin  5 mm   Distance from DCIS to Medial Margin  Greater than: 10 mm   Distance from DCIS to Lateral Margin  at margin mm     REGIONAL LYMPH NODES   Regional Lymph Node Status  All regional lymph nodes negative for tumor   Total Number of Lymph Nodes Examined (sentinel and non-sentinel)  3   Number of [place] Nodes Examined  2   - ER positive (%), MD positive (%), her2 2+ on IHC, FISH negative  -Additional findings: [place] usual duct hyperplasia, duct ectasia, fibrocystic change, and fibroadenomas.  AJCC 8th edition: pT2 pN0 (sn, 0/2)      INTERIM HISTORY:    Oncotype DX breast recurrence score report:  Recurrence score result: 11  Distant recurrence risk at 9 years with adjuvant endocrine therapy alone: 3%  Group average absolute chemotherapy benefit: <1%    REVIEW OF SYSTEMS:   A 14 point ROS was reviewed with pertinent positives and negatives in the HPI.        HOME MEDICATIONS:  Current Outpatient Medications   Medication Sig Dispense Refill     atorvastatin (LIPITOR) 20 MG tablet Take 1 tablet (20 mg) by mouth daily 90 tablet 3     lisinopril (ZESTRIL) 20 MG tablet Take 2 tablets (40 mg) by mouth daily 180 tablet 2         ALLERGIES:  No Known Allergies      PAST MEDICAL HISTORY:  Past Medical History:   Diagnosis Date     Family history of colon cancer      Hyperlipidemia LDL goal <130 8/28/2012     Hyperplastic colonic polyp      Hypertension goal BP (blood pressure) < 140/80 8/28/2012     Knee joint effusion 8/28/2012         PAST SURGICAL HISTORY:  Past Surgical History:   Procedure Laterality Date     GYN SURGERY       HYSTERECTOMY TOTAL ABDOMINAL       LAPAROSCOPY DIAGNOSTIC (GENERAL) N/A 1/2/2024    Procedure: LAPAROSCOPY, DIAGNOSTIC, laparoscopic lysis of adhesions;  Surgeon: Philippe Esquivel MD;  Location:  OR      LAPAROTOMY, LYSIS ADHESIONS, COMBINED N/A 2024    Procedure: LAPAROTOMY, EXPLORATORY, WITH open LYSIS OF ADHESIONS, reduction of internal hernia;  Surgeon: Philippe Esquivel MD;  Location:  OR         SOCIAL HISTORY:  Social History     Socioeconomic History     Marital status:      Spouse name: Not on file     Number of children: Not on file     Years of education: Not on file     Highest education level: Not on file   Occupational History     Not on file   Tobacco Use     Smoking status: Former     Packs/day: .5     Types: Cigarettes     Quit date: 2023     Years since quittin.2     Smokeless tobacco: Former     Quit date: 2013   Vaping Use     Vaping Use: Never used   Substance and Sexual Activity     Alcohol use: Yes     Drug use: No     Sexual activity: Yes     Partners: Male   Other Topics Concern     Parent/sibling w/ CABG, MI or angioplasty before 65F 55M? Yes   Social History Narrative     Not on file     Social Determinants of Health     Financial Resource Strain: Low Risk  (2024)    Financial Resource Strain      Within the past 12 months, have you or your family members you live with been unable to get utilities (heat, electricity) when it was really needed?: No   Food Insecurity: Low Risk  (2024)    Food Insecurity      Within the past 12 months, did you worry that your food would run out before you got money to buy more?: No      Within the past 12 months, did the food you bought just not last and you didn't have money to get more?: No   Transportation Needs: Low Risk  (2024)    Transportation Needs      Within the past 12 months, has lack of transportation kept you from medical appointments, getting your medicines, non-medical meetings or appointments, work, or from getting things that you need?: No   Physical Activity: Unknown (2024)    Exercise Vital Sign      Days of Exercise per Week: 2 days      Minutes of Exercise per Session: Patient declined  "  Stress: No Stress Concern Present (2/27/2024)    [employer] - Occupational Stress Questionnaire      Feeling of Stress : Only a little   Social Connections: Unknown (2/27/2024)    Social Connection and Isolation Panel [NHANES]      Frequency of Communication with Friends and Family: Not on file      Frequency of Social Gatherings with Friends and Family: Once a week      Attends Protestant Services: Not on file      Active Member of Clubs or Organizations: Not on file      Attends Club or Organization Meetings: Not on file      Marital Status: Not on file   Interpersonal Safety: Low Risk  (2/27/2024)    Interpersonal Safety      Do you feel physically and emotionally safe where you currently live?: Yes      Within the past 12 months, have you been hit, slapped, kicked or otherwise physically hurt by someone?: No      Within the past 12 months, have you been humiliated or emotionally abused in other ways by your partner or ex-partner?: No   Housing Stability: Low Risk  (2/27/2024)    Housing Stability      Do you have housing? : Yes      Are you worried about losing your housing?: No         FAMILY HISTORY:  No family history on file.      PHYSICAL EXAM:  Vital signs:  There were no vitals taken for this visit.       LABS:    PATHOLOGY:      IMAGING:                                                                     IMPRESSION: NORMAL. Bone mineral density measurements are within normal limits using T score.    ASSESSMENT/PLAN:  Jinny Smith is a 64 year old female with:    # stage 2A pT2 pN0 (sn, 0/2) LEFT breast carcinoma, ER positive, [place] positive, her2 negative on FISH   - 12/31/23 CT AP w/contrast for abd pain, N/V:  incidental finding \"breast mass in the left inferior breast at approximately the 6:00 position measuring 1.5 x 1.1 cm\"  - 1/31/24 Diagnostic bilateral breast mammogram, targeted left breast US:  Left breast, lower central posterior aspect, 6 oclock position, 3 cm " from nipple, 1.2x1.1x1.9cm heterogenous round/oval mass and a few smaller adjacent round nodules- on US one seen 0.4cm away from main mass, 0.9x0.4x0.5cm. The subtle nodular opacities seen about the dominant mass cover an area of roughly 3.5 x 2.8 cm on the CC view. In the retro-areolar region, inferiorly, at or near skin, few benign appearing calcifications. No comment on LN  - 2/13/24 Ultrasound-guided LEFT breast core needle biopsy of lesion at 6 oclock, 3 cm from nipple, PATHOLOGY: Fragmented portions of carcinoma with focal solid papillary and mucinous features. Associated stroma with fibrosis, hemosiderin and acute hemorrhage suggesting the wall of a cyst.  ER (%, strong), PA (75%, moderate to strong). The findings in this biopsy confirm malignancy but the exact subclassification and the distinction between in situ and invasive carcinoma cannot be determined due to the nature of the tumor cells, the presence of a possible cyst wall, and the disrupted nature of the biopsy fragments.  The differential diagnosis includes solid papillary carcinoma with mucinous features and a combination of both solid papillary carcinoma and mucinous carcinoma.   - 2/26/24 consult with Dr. Esquivel- recommended MRI breast to assess extent of disease and consider BCT, pt does not wish to do MRI, plan for mastectomy w/SLNBx and no reconstruction on 3/27/24  - 3/27/24 wire localized left breast lumpectomy with left axillary SLNBx with Dr. Esquivel, PATHOLOGY: lumpectomy: solid papillary carcinoma w/mucinous carcinoma, mucinous carcinoma: single focus 3.0x2.7x1.7cm, grade 1,  solid papillary carcinoma (95%) and DCIS- grade 2, 4.5cm; LVI negative, dermal LVI negative; left axillary SLNBx negative (0/2), one intramammary LN negative; margins positive- invasive carcinoma- anterior margin (at margin); DCIS present at margin (multifocal); ER positive (%), PA positive (%), her2 2+ on IHC, FISH negative, AJCC 8th edition:  stage 2A pT2 pN0 (sn, 0/2)  - Oncotype DX breast recurrence score report:  Recurrence score result: 11  Distant recurrence risk at 9 years with adjuvant endocrine therapy alone: 3%  Group average absolute chemotherapy benefit: <1%    - 1/24 CBC , CMP WNL, lipid panel: total cholesterol 243, , , HDL 47  - 3/24 CBC WNL, CMP total protein 8.4, albumin 4.7, hgbA1c 5.6    - 4/24 DEXA normal     PLAN:  - oncotype discussed in detail today. Adjuvant chemotherapy is not recommended.   - follow up with Dr. Esquivel regarding additional surgery. Will follow for pathology to determine if adjuvant radiation is required.   - regardless of the above, pt will need at least 5 years of adjuvant endocrine therapy. Will discuss details at next visit  and plan to start 2 weeks after surgery/RT  - of note, pt has htn, hld, s/p hysterectomy + BSO in 1990s for dysplasia   - continue calcium and vitamin D OTC  - repeat DEXA 4/2026    #macrocytosis w/o anemia  - 1/24 CBC hgb 11.4->12.1 w/-102  - 3/24 labs  CBC hgb 13.4, MCV 96  B12 548, RBC folate 705, Copper normal, Zinc normal  Absolute retic 0.068  TSH 1.07    PLAN:  - spontaneously resolved w/o intervention    #hld  - on atorvastatin     #left adrenal nodule  - 1/24 CT A w/o contrast: Technically indeterminate 10 mm left adrenal nodule. Recommend correlation with adrenal function studies. Consider follow-up renal MRI for further characterization if clinically indicated. Otherwise, consider six-month follow-up CT to assess for stability.  - pt does not want to do MRI, planning on repeat CT w/PCP 7/24    #family hx of cancer  - mother- breast cancer, paternal 1/2 aunt- breast cancer, details unknown; father colon cancer in his 50s  - genetic counseling referral  - labs completed  - see above      RTC 8 weeks for follow up with me, labs prior to visit (to be adjusted to surgery date- follow up should be 2 weeks after surgery completed)      Sweta Brand  DO  Hematology/Oncology  [facility]      Again, thank you for allowing me to participate in the care of your patient.        Sincerely,        XAVIER ALVAREZ, DO

## 2024-05-14 NOTE — PROGRESS NOTES
"Telephone-Visit Details     Telephone Start Time: 4:18PM     Type of service:  Telephone Visit     Telephone End Time: 4:23PM    Originating Location (pt. 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 Pt is s/p hysterectomy and possible BSO in 1980s for ovarian cysts and reported dysplasia ?location.     - 12/31/23 CT AP w/contrast for abd pain, N/V:   incidental finding \"breast mass in the left inferior breast at approximately the 6:00 position measuring 1.5 x 1.1 cm\"  - 1/31/24 Diagnostic bilateral breast mammogram:  Left breast, lower central posterior aspect, round mass and a few smaller adjacent round nodules. In the retro-areolar region, inferiorly, at or near skin, few benign appearing calcifications  - 1/31/24 Targeted LEFT breast ultrasound:  Left breast, In the 6:00 position 3 cm from the nipple there is a 12 x 11 x 19 mm oval heterogeneous mass that correlates with the largest mass seen on previous mammogram. The surrounding tissue is heterogeneous. Only one definitive adjacent nodule is seen.  This is approximately 4 mm away from the main mass and is bilobed and measures 9 x 4 x 5 mm.  The subtle nodular opacities seen about the dominant mass cover an area of roughly 3.5 x 2.8 cm on the CC view. No other " definable masses are seen.  - 2/13/24 Ultrasound-guided LEFT breast core needle biopsy of lesion at 6 oclock, 3 cm from nipple  PATHOLOGY  A. Left breast mass, ultrasound-guided core biopsy:   -Fragmented portions of carcinoma with focal solid papillary and mucinous features.  -Associated stroma with fibrosis, hemosiderin and acute hemorrhage suggesting the wall of a cyst.    -Surgical excision is needed for definitive diagnosis and subclassification (see Comment).  The neoplastic cells are strongly and diffusely positive for estrogen receptor (91 to 100%). The progesterone receptor is also positive (moderate to strong staining, approximately 75%). Immunohistochemistry for p63 shows scattered positive cells within both the solid and cribriform areas.       Comment    The findings in this biopsy confirm malignancy but the exact subclassification and the distinction between in situ and invasive carcinoma cannot be determined due to the nature of the tumor cells, the presence of a possible cyst wall, and the disrupted nature of the biopsy fragments.  The differential diagnosis includes solid papillary carcinoma with mucinous features and a combination of both solid papillary carcinoma and mucinous carcinoma.  Complete surgical excision is needed for definitive diagnosis.  The complex left breast ultrasound findings are also noted.     - 2/26/24 consult with Dr. Esquivel- recommended MRI breast to assess extent of disease and consider BCT, pt does not wish to do MRI, plan for mastectomy w/SLNBx and no reconstruction on 3/27/24    Lifetime estrogen exposure:  Menarche: 10  Last menstrual period: in her late 20s, 1980s  Age of first pregnancy: 17 y/o  Number of pregnancies: 1  Weight gain: 20lbs in last year  Exposure to exogenous estrogen: OCPs 2 years, estrogen after hysterectomy x10-15 years     Family history of:  1.  Breast cancer including male breast cancer: mother- breast, lumpectomy in her early 70s, RT; paternal  1/2 aunt- breast cancer, details unknown   2. Ovarian cancer: negative  3.  Pancreatic cancer: negative  4.  Prostate cancer: negative  5. Diffuse gastric cancer (if lobular breast CA): negative  6. Uterine cancer: negative  7. Colon cancer: father- dx in his late 50s,  age 66     Patient denies the following:  History of DVT/PE/VTE for self or family   Cataracts  Severe arthralgias and myalgias  Cardiovascular risk factors including diabetes  Osteoporosis  Current use of antidepressants    Pt has htn, hld. Pt is s/p hysterectomy and possible BSO in  for ovarian cysts and reported dysplasia ?location.     Regarding adrenal nodule:  - 23 CT AP: Indeterminant 1.1 cm left adrenal nodule   - 24 CT Abdomen w/wo contrast: A small round previously seen left adrenal nodule  measures 10 mm and demonstrates an average density of 24 Hounsfield units on unenhanced images, 180 Hounsfield units on early postcontrast imaging, and 67 Hounsfield units on 15 minute delayed postcontrast imaging, which results in an absolute washout of 48.8% (absolute washout less than 60% is indeterminant) and a relative washout of 38% (relative washout less than 40% is indeterminate)  IMPRESSION:   1.  Technically indeterminate 10 mm left adrenal nodule. Recommend correlation with adrenal function studies. Consider follow-up renal MRI for further characterization if clinically indicated. Otherwise, consider six-month follow-up CT to assess for stability       Pt has alcohol a few times a week, prior to that 2 drinks, 4x/week, vodka.     Pt was unable to tolerate MRI with premedications w/ativan, had possible rash related to that. She did not wish to reattempt MRI with different sedation.     - 3/27/24 left breast lumpectomy with left axillary SLNBx with Dr. Esquivel  PATHOLOGY  A.  Lymph node, left axillary sentinel #[id number], excisional biopsy:  -One lymph node, negative for metastatic carcinoma (0/1).     B.  Lymph node, left  axillary sentinel #2 -[id number], excisional biopsy:  -One lymph node, negative for metastatic carcinoma (0/1).     C.  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  -Single focus,  [30.0] mm in greatest size (3x2.7x1.7cm) (calculation as follows: Mucinous carcinoma spans six contiguous slices, # =3.0 cm ); additional dimensions: 2.7 x 1.7 cm.  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  -Carcinoma in-situ spans nine contiguous slices, # 5=13= approximately 4.5 cm.  - LVI negative, dermal LVI negative  -Previous biopsy site identified.  -Skeletal muscle is identified and uninvolved by tumor.  -One intramammary lymph node, negative for metastatic carcinoma (0/1).  -margin:    MARGINS   Margin Status for Invasive Carcinoma  Invasive carcinoma present at margin   Margin(s) Involved by Invasive Carcinoma  Anterior: 0.1 mm extent   Distance from Invasive Carcinoma to Anterior Margin  at margin mm   Distance from Invasive Carcinoma to Posterior Margin  Greater than: 10 mm   Distance from Invasive Carcinoma to Superior Margin  8.2 mm   Distance from Invasive Carcinoma to Inferior Margin  7 mm   Distance from Invasive Carcinoma to Medial Margin  Greater than: 10 mm   Distance from Invasive Carcinoma to Lateral Margin  Greater than: 10 mm   Margin Status for DCIS  DCIS present at margin   Margin(s) Involved by DCIS  Superior: 1 mm in extent     Lateral: 0.8 mm in extent   Distance from DCIS to Anterior Margin  Less than: 1 mm   Distance from DCIS to Posterior Margin  Greater than: 10 mm   Distance from DCIS to Superior Margin  at margin mm   Distance from DCIS to Inferior Margin  5 mm   Distance from DCIS to Medial Margin  Greater than: 10 mm   Distance from DCIS to  Lateral Margin  at margin mm     REGIONAL LYMPH NODES   Regional Lymph Node Status  All regional lymph nodes negative for tumor   Total Number of Lymph Nodes Examined (sentinel and non-sentinel)  3   Number of [place] Nodes Examined  2   - ER positive (%), TN positive (%), her2 2+ on IHC, FISH negative  -Additional findings: [place] usual duct hyperplasia, duct ectasia, fibrocystic change, and fibroadenomas.  AJCC 8th edition: pT2 pN0 (sn, 0/2)      INTERIM HISTORY:    Oncotype DX breast recurrence score report:  Recurrence score result: 11  Distant recurrence risk at 9 years with adjuvant endocrine therapy alone: 3%  Group average absolute chemotherapy benefit: <1%    REVIEW OF SYSTEMS:   A 14 point ROS was reviewed with pertinent positives and negatives in the HPI.        HOME MEDICATIONS:  Current Outpatient Medications   Medication Sig Dispense Refill    atorvastatin (LIPITOR) 20 MG tablet Take 1 tablet (20 mg) by mouth daily 90 tablet 3    lisinopril (ZESTRIL) 20 MG tablet Take 2 tablets (40 mg) by mouth daily 180 tablet 2         ALLERGIES:  No Known Allergies      PAST MEDICAL HISTORY:  Past Medical History:   Diagnosis Date    Family history of colon cancer     Hyperlipidemia LDL goal <130 8/28/2012    Hyperplastic colonic polyp     Hypertension goal BP (blood pressure) < 140/80 8/28/2012    Knee joint effusion 8/28/2012         PAST SURGICAL HISTORY:  Past Surgical History:   Procedure Laterality Date    GYN SURGERY      HYSTERECTOMY TOTAL ABDOMINAL      LAPAROSCOPY DIAGNOSTIC (GENERAL) N/A 1/2/2024    Procedure: LAPAROSCOPY, DIAGNOSTIC, laparoscopic lysis of adhesions;  Surgeon: Philippe Esquivel MD;  Location: [facility] OR    LAPAROTOMY, LYSIS ADHESIONS, COMBINED N/A 1/2/2024    Procedure: LAPAROTOMY, EXPLORATORY, WITH open LYSIS OF ADHESIONS, reduction of internal hernia;  Surgeon: Philippe Esquivel MD;  Location: [facility] OR         SOCIAL HISTORY:  Social History     Socioeconomic History    Marital  status:      Spouse name: Not on file    Number of children: Not on file    Years of education: Not on file    Highest education level: Not on file   Occupational History    Not on file   Tobacco Use    Smoking status: Former     Packs/day: .5     Types: Cigarettes     Quit date: 2023     Years since quittin.2    Smokeless tobacco: Former     Quit date: 2013   Vaping Use    Vaping Use: Never used   Substance and Sexual Activity    Alcohol use: Yes    Drug use: No    Sexual activity: Yes     Partners: Male   Other Topics Concern    Parent/sibling w/ CABG, MI or angioplasty before 65F 55M? Yes   Social History Narrative    Not on file     Social Determinants of Health     Financial Resource Strain: Low Risk  (2024)    Financial Resource Strain     Within the past 12 months, have you or your family members you live with been unable to get utilities (heat, electricity) when it was really needed?: No   Food Insecurity: Low Risk  (2024)    Food Insecurity     Within the past 12 months, did you worry that your food would run out before you got money to buy more?: No     Within the past 12 months, did the food you bought just not last and you didn't have money to get more?: No   Transportation Needs: Low Risk  (2024)    Transportation Needs     Within the past 12 months, has lack of transportation kept you from medical appointments, getting your medicines, non-medical meetings or appointments, work, or from getting things that you need?: No   Physical Activity: Unknown (2024)    Exercise Vital Sign     Days of Exercise per Week: 2 days     Minutes of Exercise per Session: Patient declined   Stress: No Stress Concern Present (2024)    [place] [employer] - Occupational Stress Questionnaire     Feeling of Stress : Only a little   Social Connections: Unknown (2024)    Social Connection and Isolation Panel [NHANES]     Frequency of Communication with  "Friends and Family: Not on file     Frequency of Social Gatherings with Friends and Family: Once a week     Attends Nondenominational Services: Not on file     Active Member of Clubs or Organizations: Not on file     Attends Club or Organization Meetings: Not on file     Marital Status: Not on file   Interpersonal Safety: Low Risk  (2/27/2024)    Interpersonal Safety     Do you feel physically and emotionally safe where you currently live?: Yes     Within the past 12 months, have you been hit, slapped, kicked or otherwise physically hurt by someone?: No     Within the past 12 months, have you been humiliated or emotionally abused in other ways by your partner or ex-partner?: No   Housing Stability: Low Risk  (2/27/2024)    Housing Stability     Do you have housing? : Yes     Are you worried about losing your housing?: No         FAMILY HISTORY:  No family history on file.      PHYSICAL EXAM:  Vital signs:  There were no vitals taken for this visit.       LABS:    PATHOLOGY:      IMAGING:                                                                     IMPRESSION: NORMAL. Bone mineral density measurements are within normal limits using T score.    ASSESSMENT/PLAN:  Jinny Smith is a 64 year old female with:    # stage 2A pT2 pN0 (sn, 0/2) LEFT breast carcinoma, ER positive, [place] positive, her2 negative on FISH   - 12/31/23 CT AP w/contrast for abd pain, N/V:  incidental finding \"breast mass in the left inferior breast at approximately the 6:00 position measuring 1.5 x 1.1 cm\"  - 1/31/24 Diagnostic bilateral breast mammogram, targeted left breast US:  Left breast, lower central posterior aspect, 6 oclock position, 3 cm from nipple, 1.2x1.1x1.9cm heterogenous round/oval mass and a few smaller adjacent round nodules- on US one seen 0.4cm away from main mass, 0.9x0.4x0.5cm. The subtle nodular opacities seen about the dominant mass cover an area of roughly 3.5 x 2.8 cm on the CC view. In the retro-areolar region, " inferiorly, at or near skin, few benign appearing calcifications. No comment on LN  - 2/13/24 Ultrasound-guided LEFT breast core needle biopsy of lesion at 6 oclock, 3 cm from nipple, PATHOLOGY: Fragmented portions of carcinoma with focal solid papillary and mucinous features. Associated stroma with fibrosis, hemosiderin and acute hemorrhage suggesting the wall of a cyst.  ER (%, strong), OR (75%, moderate to strong). The findings in this biopsy confirm malignancy but the exact subclassification and the distinction between in situ and invasive carcinoma cannot be determined due to the nature of the tumor cells, the presence of a possible cyst wall, and the disrupted nature of the biopsy fragments.  The differential diagnosis includes solid papillary carcinoma with mucinous features and a combination of both solid papillary carcinoma and mucinous carcinoma.   - 2/26/24 consult with Dr. Esquivel- recommended MRI breast to assess extent of disease and consider BCT, pt does not wish to do MRI, plan for mastectomy w/SLNBx and no reconstruction on 3/27/24  - 3/27/24 wire localized left breast lumpectomy with left axillary SLNBx with Dr. Esquivel, PATHOLOGY: lumpectomy: solid papillary carcinoma w/mucinous carcinoma, mucinous carcinoma: single focus 3.0x2.7x1.7cm, grade 1,  solid papillary carcinoma (95%) and DCIS- grade 2, 4.5cm; LVI negative, dermal LVI negative; left axillary SLNBx negative (0/2), one intramammary LN negative; margins positive- invasive carcinoma- anterior margin (at margin); DCIS present at margin (multifocal); ER positive (%), OR positive (%), her2 2+ on IHC, FISH negative, AJCC 8th edition: stage 2A pT2 pN0 (sn, 0/2)  - Oncotype DX breast recurrence score report:  Recurrence score result: 11  Distant recurrence risk at 9 years with adjuvant endocrine therapy alone: 3%  Group average absolute chemotherapy benefit: <1%    - 1/24 CBC , CMP WNL, lipid panel: total cholesterol 243,  , , HDL 47  - 3/24 CBC WNL, CMP total protein 8.4, albumin 4.7, hgbA1c 5.6    - 4/24 DEXA normal     PLAN:  - oncotype discussed in detail today. Adjuvant chemotherapy is not recommended.   - follow up with Dr. Esquivel regarding additional surgery. Will follow for pathology to determine if adjuvant radiation is required.   - regardless of the above, pt will need at least 5 years of adjuvant endocrine therapy. Will discuss details at next visit  and plan to start 2 weeks after surgery/RT  - of note, pt has htn, hld, s/p hysterectomy + BSO in 1990s for dysplasia   - continue calcium and vitamin D OTC  - repeat DEXA 4/2026    #macrocytosis w/o anemia  - 1/24 CBC hgb 11.4->12.1 w/-102  - 3/24 labs  CBC hgb 13.4, MCV 96  B12 548, RBC folate 705, Copper normal, Zinc normal  Absolute retic 0.068  TSH 1.07    PLAN:  - spontaneously resolved w/o intervention    #hld  - on atorvastatin     #left adrenal nodule  - 1/24 CT A w/o contrast: Technically indeterminate 10 mm left adrenal nodule. Recommend correlation with adrenal function studies. Consider follow-up renal MRI for further characterization if clinically indicated. Otherwise, consider six-month follow-up CT to assess for stability.  - pt does not want to do MRI, planning on repeat CT w/PCP 7/24    #family hx of cancer  - mother- breast cancer, paternal 1/2 aunt- breast cancer, details unknown; father colon cancer in his 50s  - genetic counseling referral  - labs completed  - see above      RTC 8 weeks for follow up with me, labs prior to visit (to be adjusted to surgery date- follow up should be 2 weeks after surgery completed)      Sweta Sunday,   Hematology/Oncology  [facility]

## 2024-05-15 ENCOUNTER — TELEPHONE (OUTPATIENT)
Dept: SURGERY | Facility: CLINIC | Age: 64
End: 2024-05-15
Payer: MEDICARE

## 2024-05-15 LAB — SPECIMEN STATUS: NORMAL

## 2024-05-15 NOTE — TELEPHONE ENCOUNTER
Spoke to patient.  She wishes to proceed to bilateral mastectomy.  She has no interest in reconstruction.   The procedure, risks, benefits, and alternatives were discussed and the patient agrees to proceed.  A case request will be placed.    Philippe Esquivel MD, FACS

## 2024-05-15 NOTE — TELEPHONE ENCOUNTER
Type of surgery: MASTECTOMY, SIMPLE (Bilateral)   Location of surgery: [facility]  Date and time of surgery: 6/26  Surgeon: Alvin  Pre-Op Appt Date: 6/3  Post-Op Appt Date: 7/9   Packet sent out: Yes  Pre-cert/Authorization completed:  Not Applicable  Date: na  If jury duty end of may/first week of June gets cancelled she would like that sooner option. Added to my wait list for Alvin

## 2024-05-15 NOTE — TELEPHONE ENCOUNTER
"Per Dr. Brand's note from 5/14/24:    \"Follow up with Dr. Esquivel-pt wants to proceed with left breast mastectomy. She is considering bilateral mastectomy because she is nervous about cancer recurrence and how unilateral mastectomy will appear. I have reached out to Dr. Esquivel about this. I have also reached out to our genetics team regarding expediting testing as genetic testing could alter recommendations regarding prophylactic mastectomy.\"    Please review and place case request if appropriate.     Lorraine Fajardo RN   [facility]    "

## 2024-05-17 NOTE — NURSING NOTE
DISCHARGE PLAN:  Next appointments: See patient instruction section  Departure Mode:   Accompanied by:    minutes for medical assistant discharge (face to face time)     Jinny Smith is here today for onc follow up.  Patient was not seen by medical assistant at time of the appointment.   Appointments scheduled for follow up and labs with Brand. I called pt and coordinated appts.  See patient instructions and Oncologist's Progress note for further details. Questions and concerns addressed to patient's satisfaction. Patient verbalized and demonstrated understanding of plan.  Contact information provided and patient is encouraged to call with any that arise in the interim of care.    Sarai VARGAS [facility]  [phone]  5/17/2024, 3:01 PM

## 2024-06-03 ENCOUNTER — OFFICE VISIT (OUTPATIENT)
Dept: INTERNAL MEDICINE | Facility: CLINIC | Age: 64
End: 2024-06-03
Payer: MEDICARE

## 2024-06-03 VITALS
SYSTOLIC BLOOD PRESSURE: 138 MMHG | TEMPERATURE: 98.7 F | HEART RATE: 100 BPM | RESPIRATION RATE: 18 BRPM | DIASTOLIC BLOOD PRESSURE: 78 MMHG | OXYGEN SATURATION: 97 % | BODY MASS INDEX: 30.65 KG/M2 | WEIGHT: 173 LBS

## 2024-06-03 DIAGNOSIS — Z01.818 PRE-OP EXAM: Primary | ICD-10-CM

## 2024-06-03 DIAGNOSIS — I10 HYPERTENSION GOAL BP (BLOOD PRESSURE) < 140/80: ICD-10-CM

## 2024-06-03 DIAGNOSIS — C50.912 MALIGNANT NEOPLASM OF LEFT FEMALE BREAST, UNSPECIFIED ESTROGEN RECEPTOR STATUS, UNSPECIFIED SITE OF BREAST (H): ICD-10-CM

## 2024-06-03 LAB
ANION GAP SERPL CALCULATED.3IONS-SCNC: 14 MMOL/L (ref 7–15)
BUN SERPL-MCNC: 28.2 MG/DL (ref 8–23)
CALCIUM SERPL-MCNC: 9.9 MG/DL (ref 8.8–10.2)
CHLORIDE SERPL-SCNC: 101 MMOL/L (ref 98–107)
CREAT SERPL-MCNC: 0.76 MG/DL (ref 0.51–0.95)
DEPRECATED HCO3 PLAS-SCNC: 21 MMOL/L (ref 22–29)
EGFRCR SERPLBLD CKD-EPI 2021: 86 ML/MIN/1.73M2
ERYTHROCYTE [DISTWIDTH] IN BLOOD BY AUTOMATED COUNT: 12.7 % (ref 10–15)
GLUCOSE SERPL-MCNC: 106 MG/DL (ref 70–99)
HCT VFR BLD AUTO: 42.7 % (ref 35–47)
HGB BLD-MCNC: 14.1 G/DL (ref 11.7–15.7)
MCH RBC QN AUTO: 31.7 PG (ref 26.5–33)
MCHC RBC AUTO-ENTMCNC: 33 G/DL (ref 31.5–36.5)
MCV RBC AUTO: 96 FL (ref 78–100)
PLATELET # BLD AUTO: 251 10E3/UL (ref 150–450)
POTASSIUM SERPL-SCNC: 5.2 MMOL/L (ref 3.4–5.3)
RBC # BLD AUTO: 4.45 10E6/UL (ref 3.8–5.2)
SODIUM SERPL-SCNC: 136 MMOL/L (ref 135–145)
WBC # BLD AUTO: 9.1 10E3/UL (ref 4–11)

## 2024-06-03 PROCEDURE — 99214 OFFICE O/P EST MOD 30 MIN: CPT | Performed by: INTERNAL MEDICINE

## 2024-06-03 PROCEDURE — G2211 COMPLEX E/M VISIT ADD ON: HCPCS | Performed by: INTERNAL MEDICINE

## 2024-06-03 PROCEDURE — 80048 BASIC METABOLIC PNL TOTAL CA: CPT | Performed by: INTERNAL MEDICINE

## 2024-06-03 PROCEDURE — 85027 COMPLETE CBC AUTOMATED: CPT | Performed by: INTERNAL MEDICINE

## 2024-06-03 PROCEDURE — 36415 COLL VENOUS BLD VENIPUNCTURE: CPT | Performed by: INTERNAL MEDICINE

## 2024-06-03 ASSESSMENT — PAIN SCALES - GENERAL: PAINLEVEL: NO PAIN (0)

## 2024-06-03 NOTE — TELEPHONE ENCOUNTER
has been released from jury duty this week. He has OR time 6/5 if patient would like to move surgery up.    Left message for patient to return my call.

## 2024-06-03 NOTE — PROGRESS NOTES
Preoperative Evaluation  [address] [address]  Phone: [phone]  Primary Provider: Celso Anaya MD  Pre-op Performing Provider: Celso Anaya MD  Alexandru 3, 2024       5/29/2024   Surgical Information   What procedure is being done? Bilateral Masectomy   Facility or Hospital where procedure/surgery will be performed: [facility]   Who is doing the procedure / surgery? Dr Esquivel   Date of surgery / procedure: June 26th   Time of surgery / procedure: Not sure   Where do you plan to recover after surgery? at home with family     Fax number for surgical facility: Note does not need to be faxed, will be available electronically in Epic.    Assessment & Plan     The proposed surgical procedure is considered intermediate risk.    Pre-op exam  - Pre-op bilateral mastectomy. Breast ca located to L breast and R breast removal as prophylaxis. Briefly discussed management of surgical drains and recovery after surgery. Educated pt to hold lisinopril day of procedure and any blood thinners (ibuprofen/warfarin) one week prior to procedure.   - CBC with platelets; Future  - Basic metabolic panel  (Ca, Cl, CO2, Creat, Gluc, K, Na, BUN); Future    Malignant neoplasm of left female breast, unspecified estrogen receptor status, unspecified site of breast (H)  - Followed by Dr. Brand. No plans of adjuvant chemo at this time. May follow-up w/ adjuvant XRT or endocrine therapy post-mastectomy.    Hypertension goal BP (blood pressure) < 140/80  BP in clinic 138/78. Within goal so continue w/ current POC. Of note, educated pt to hold lisinopril day of procedure.      Possible Sleep Apnea: No        - No identified additional risk factors other than previously addressed         Recommendation  Approval given to proceed with proposed procedure, without further diagnostic evaluation.        Junior Stearns is a 64 year old, presenting for the following:  Pre-Op  Exam          6/3/2024     2:35 PM   Additional Questions   Roomed by Jacquelin MILES     HPI related to upcoming procedure: Per note on 5/14 by Dr. Brand, pt dx w/ malignancy of L breast w/ plan for bilateral mastectomy.         5/29/2024   Pre-Op Questionnaire   Have you ever had a heart attack or stroke? No   Have you ever had surgery on your heart or blood vessels, such as a stent placement, a coronary artery bypass, or surgery on an artery in your head, neck, heart, or legs? No   Do you have chest pain with activity? No   Do you have a history of heart failure? No   Do you currently have a cold, bronchitis or symptoms of other infection? No   Do you have a cough, shortness of breath, or wheezing? No   Do you or anyone in your family have previous history of blood clots? No   Do you or does anyone in your family have a serious bleeding problem such as prolonged bleeding following surgeries or cuts? No   Have you ever had problems with anemia or been told to take iron pills? No   Have you had any abnormal blood loss such as black, tarry or bloody stools, or abnormal vaginal bleeding? No   Have you ever had a blood transfusion? No   Are you willing to have a blood transfusion if it is medically needed before, during, or after your surgery? Yes   Have you or any of your relatives ever had problems with anesthesia? No   Do you have sleep apnea, excessive snoring or daytime drowsiness? No   Do you have any artifical heart valves or other implanted medical devices like a pacemaker, defibrillator, or continuous glucose monitor? No   Do you have artificial joints? No   Are you allergic to latex? No     Health Care Directive  Patient does not have a Health Care Directive or Living Will: Discussed advance care planning with patient; however, patient declined at this time.    Preoperative Review of    reviewed - no record of controlled substances prescribed.    Patient Active Problem List    Diagnosis Date Noted    Adrenal  nodule (H24) 01/02/2024     Priority: Medium    Mass of left breast, unspecified quadrant 01/02/2024     Priority: Medium    SBO (small bowel obstruction) (H) 12/31/2023     Priority: Medium    Tobacco use disorder 12/31/2023     Priority: Medium    Leukocytosis 12/31/2023     Priority: Medium    Hyponatremia 12/31/2023     Priority: Medium    Generalized anxiety disorder 09/10/2013     Priority: Medium     Diagnosis updated by automated process. Provider to review and confirm.      H/O: hysterectomy 07/02/2013     Priority: Medium    Advanced directives, counseling/discussion 06/25/2013     Priority: Medium    Hypertension goal BP (blood pressure) < 140/80 08/28/2012     Priority: Medium    Hyperlipidemia LDL goal <130 08/28/2012     Priority: Medium    Knee joint effusion 08/28/2012     Priority: Medium      Past Medical History:   Diagnosis Date    Family history of colon cancer     Hyperlipidemia LDL goal <130 8/28/2012    Hyperplastic colonic polyp     Hypertension goal BP (blood pressure) < 140/80 8/28/2012    Knee joint effusion 8/28/2012     Past Surgical History:   Procedure Laterality Date    BIOPSY BREAST NEEDLE LOCALIZATION, BIOPSY NODE SENTINEL, COMBINED Left 3/27/2024    Procedure: BIOPSY, LEFT BREAST, WITH NEEDLE LOCALIZATION AND SENTINEL LYMPH NODE BIOPSY;  Surgeon: Philippe Esquivel MD;  Location: [facility] OR    GYN SURGERY      HYSTERECTOMY TOTAL ABDOMINAL      LAPAROSCOPY DIAGNOSTIC (GENERAL) N/A 1/2/2024    Procedure: LAPAROSCOPY, DIAGNOSTIC, laparoscopic lysis of adhesions;  Surgeon: Philippe Esquivel MD;  Location: [facility] OR    LAPAROTOMY, LYSIS ADHESIONS, COMBINED N/A 1/2/2024    Procedure: LAPAROTOMY, EXPLORATORY, WITH open LYSIS OF ADHESIONS, reduction of internal hernia;  Surgeon: Philippe Esquivel MD;  Location: [facility] OR     Current Outpatient Medications   Medication Sig Dispense Refill    atorvastatin (LIPITOR) 20 MG tablet Take 1 tablet (20 mg) by mouth daily 90 tablet 3    calcium citrate-vitamin D  "(CITRACAL) 315-6.25 MG-MCG TABS per tablet Take by mouth daily      lisinopril (ZESTRIL) 20 MG tablet Take 2 tablets (40 mg) by mouth daily 180 tablet 2       Allergies   Allergen Reactions    Lorazepam Itching and Rash     Rash on chest and upper neck after 20 minutes. Did also get itchy.        Social History     Tobacco Use    Smoking status: Former     Current packs/day: 0.00     Types: Cigarettes     Quit date: 2023     Years since quittin.4    Smokeless tobacco: Former     Quit date: 2013   Substance Use Topics    Alcohol use: Yes     History   Drug Use No             Review of Systems  Constitutional, HEENT, cardiovascular, pulmonary, gi and gu systems are negative, except as otherwise noted.    Objective    /78   Pulse 100   Temp 98.7  F (37.1  C) (Temporal)   Resp 18   Wt 78.5 kg (173 lb)   SpO2 97%   BMI 30.65 kg/m     Estimated body mass index is 30.65 kg/m  as calculated from the following:    Height as of 24: 1.6 m (5' 3\").    Weight as of this encounter: 78.5 kg (173 lb).  Physical Exam  GENERAL: alert and no distress  HENT: mouth without ulcers or lesions; tonsils Grade 2  RESP: lungs clear to auscultation - no rales, rhonchi or wheezes  CV: regular rate and rhythm, normal S1 S2, no S3 or S4, no murmur, click or rub, no peripheral edema  ABDOMEN: soft, nontender, n bowel sounds normal  MS: no gross musculoskeletal defects noted, no edema  NEURO: mentation intact and speech normal  PSYCH: mentation appears normal, affect normal/bright    Recent Labs   Lab Test 03/15/24  1200 24  1240 24  1338 24  0536 24  0538 24  1433   HGB 13.4  --   --  12.1   < >  --      --   --  191   < >  --    INR  --   --   --   --   --  1.11    143  --  142   < >  --    POTASSIUM 4.1 4.7   < > 3.4   < >  --    CR 0.70 0.72  --  0.56   < >  --    A1C 5.6  --   --   --   --   --     < > = values in this interval not displayed.        Diagnostics  Labs " pending at this time.  Results will be reviewed when available.   No EKG required for low risk surgery (cataract, skin procedure, breast biopsy, etc).    Revised Cardiac Risk Index (RCRI)  The patient has the following serious cardiovascular risks for perioperative complications:   - No serious cardiac risks = 0 points     RCRI Interpretation: 0 points: Class I (very low risk - 0.4% complication rate)       The longitudinal plan of care for the diagnosis(es)/condition(s) as documented were addressed during this visit. Due to the added complexity in care, I will continue to support Jinny in the subsequent management and with ongoing continuity of care.        Marianna Chairez, NP Student    I was present with the NP student who participated in the service and in the documentation of the note. I have verified the history and personally performed the physical exam and medical decision making. I agree with the assessment and plan of care as documented in the note.          Signed Electronically by: Celso Anaya MD

## 2024-06-12 ENCOUNTER — PATIENT OUTREACH (OUTPATIENT)
Dept: ONCOLOGY | Facility: CLINIC | Age: 64
End: 2024-06-12
Payer: MEDICARE

## 2024-06-26 ENCOUNTER — ANESTHESIA EVENT (OUTPATIENT)
Dept: SURGERY | Facility: CLINIC | Age: 64
End: 2024-06-26
Payer: MEDICARE

## 2024-06-26 ENCOUNTER — ANESTHESIA (OUTPATIENT)
Dept: SURGERY | Facility: CLINIC | Age: 64
End: 2024-06-26
Payer: MEDICARE

## 2024-06-26 ENCOUNTER — HOSPITAL ENCOUNTER (OUTPATIENT)
Facility: CLINIC | Age: 64
Discharge: HOME | End: 2024-06-27
Attending: SPECIALIST | Admitting: SPECIALIST
Payer: MEDICARE

## 2024-06-26 DIAGNOSIS — G89.18 POST-OP PAIN: Primary | ICD-10-CM

## 2024-06-26 LAB — GLUCOSE BLDC GLUCOMTR-MCNC: 102 MG/DL (ref 70–99)

## 2024-06-26 PROCEDURE — 250N000025 HC SEVOFLURANE, PER MIN: Performed by: SPECIALIST

## 2024-06-26 PROCEDURE — 360N000076 HC SURGERY LEVEL 3, PER MIN: Performed by: SPECIALIST

## 2024-06-26 PROCEDURE — 258N000003 HC RX IP 258 OP 636: Performed by: NURSE ANESTHETIST, CERTIFIED REGISTERED

## 2024-06-26 PROCEDURE — 250N000009 HC RX 250: Performed by: NURSE ANESTHETIST, CERTIFIED REGISTERED

## 2024-06-26 PROCEDURE — 250N000013 HC RX MED GY IP 250 OP 250 PS 637: Performed by: SPECIALIST

## 2024-06-26 PROCEDURE — 250N000011 HC RX IP 250 OP 636: Mod: JZ | Performed by: NURSE ANESTHETIST, CERTIFIED REGISTERED

## 2024-06-26 PROCEDURE — 271N000001 HC OR GENERAL SUPPLY NON-STERILE: Performed by: SPECIALIST

## 2024-06-26 PROCEDURE — 88307 TISSUE EXAM BY PATHOLOGIST: CPT | Mod: 26 | Performed by: STUDENT IN AN ORGANIZED HEALTH CARE EDUCATION/TRAINING PROGRAM

## 2024-06-26 PROCEDURE — 710N000010 HC RECOVERY PHASE 1, LEVEL 2, PER MIN: Performed by: SPECIALIST

## 2024-06-26 PROCEDURE — 19303 MAST SIMPLE COMPLETE: CPT | Mod: AS | Performed by: PHYSICIAN ASSISTANT

## 2024-06-26 PROCEDURE — 88360 TUMOR IMMUNOHISTOCHEM/MANUAL: CPT | Mod: TC | Performed by: SPECIALIST

## 2024-06-26 PROCEDURE — 250N000011 HC RX IP 250 OP 636: Mod: JZ | Performed by: SPECIALIST

## 2024-06-26 PROCEDURE — 999N000141 HC STATISTIC PRE-PROCEDURE NURSING ASSESSMENT: Performed by: SPECIALIST

## 2024-06-26 PROCEDURE — 272N000001 HC OR GENERAL SUPPLY STERILE: Performed by: SPECIALIST

## 2024-06-26 PROCEDURE — 88341 IMHCHEM/IMCYTCHM EA ADD ANTB: CPT | Mod: 26 | Performed by: STUDENT IN AN ORGANIZED HEALTH CARE EDUCATION/TRAINING PROGRAM

## 2024-06-26 PROCEDURE — 250N000011 HC RX IP 250 OP 636: Performed by: SPECIALIST

## 2024-06-26 PROCEDURE — 88342 IMHCHEM/IMCYTCHM 1ST ANTB: CPT | Mod: 26 | Performed by: STUDENT IN AN ORGANIZED HEALTH CARE EDUCATION/TRAINING PROGRAM

## 2024-06-26 PROCEDURE — 120N000001 HC R&B MED SURG/OB

## 2024-06-26 PROCEDURE — 19303 MAST SIMPLE COMPLETE: CPT | Mod: 50 | Performed by: SPECIALIST

## 2024-06-26 PROCEDURE — 250N000009 HC RX 250: Performed by: SPECIALIST

## 2024-06-26 PROCEDURE — 370N000017 HC ANESTHESIA TECHNICAL FEE, PER MIN: Performed by: SPECIALIST

## 2024-06-26 PROCEDURE — 258N000003 HC RX IP 258 OP 636: Performed by: SPECIALIST

## 2024-06-26 RX ORDER — NALOXONE HYDROCHLORIDE 0.4 MG/ML
0.4 INJECTION, SOLUTION INTRAMUSCULAR; INTRAVENOUS; SUBCUTANEOUS
Status: DISCONTINUED | OUTPATIENT
Start: 2024-06-26 | End: 2024-06-27 | Stop reason: HOSPADM

## 2024-06-26 RX ORDER — ALBUTEROL SULFATE 0.83 MG/ML
2.5 SOLUTION RESPIRATORY (INHALATION) EVERY 4 HOURS PRN
Status: DISCONTINUED | OUTPATIENT
Start: 2024-06-26 | End: 2024-06-26 | Stop reason: HOSPADM

## 2024-06-26 RX ORDER — OXYCODONE HYDROCHLORIDE 5 MG/1
10 TABLET ORAL EVERY 4 HOURS PRN
Status: DISCONTINUED | OUTPATIENT
Start: 2024-06-26 | End: 2024-06-27 | Stop reason: HOSPADM

## 2024-06-26 RX ORDER — OXYCODONE HYDROCHLORIDE 5 MG/1
5 TABLET ORAL EVERY 4 HOURS PRN
Status: DISCONTINUED | OUTPATIENT
Start: 2024-06-26 | End: 2024-06-27 | Stop reason: HOSPADM

## 2024-06-26 RX ORDER — FAMOTIDINE 20 MG/1
20 TABLET, FILM COATED ORAL 2 TIMES DAILY
Status: DISCONTINUED | OUTPATIENT
Start: 2024-06-26 | End: 2024-06-27 | Stop reason: HOSPADM

## 2024-06-26 RX ORDER — PROPOFOL 10 MG/ML
INJECTION, EMULSION INTRAVENOUS PRN
Status: DISCONTINUED | OUTPATIENT
Start: 2024-06-26 | End: 2024-06-26

## 2024-06-26 RX ORDER — OXYCODONE HYDROCHLORIDE 5 MG/1
5-10 TABLET ORAL EVERY 6 HOURS PRN
Qty: 10 TABLET | Refills: 0 | Status: SHIPPED | OUTPATIENT
Start: 2024-06-26 | End: 2024-07-09

## 2024-06-26 RX ORDER — KETOROLAC TROMETHAMINE 15 MG/ML
15 INJECTION, SOLUTION INTRAMUSCULAR; INTRAVENOUS EVERY 6 HOURS
Status: COMPLETED | OUTPATIENT
Start: 2024-06-27 | End: 2024-06-27

## 2024-06-26 RX ORDER — FENTANYL CITRATE 50 UG/ML
50 INJECTION, SOLUTION INTRAMUSCULAR; INTRAVENOUS EVERY 5 MIN PRN
Status: DISCONTINUED | OUTPATIENT
Start: 2024-06-26 | End: 2024-06-26 | Stop reason: HOSPADM

## 2024-06-26 RX ORDER — PROPOFOL 10 MG/ML
INJECTION, EMULSION INTRAVENOUS CONTINUOUS PRN
Status: DISCONTINUED | OUTPATIENT
Start: 2024-06-26 | End: 2024-06-26

## 2024-06-26 RX ORDER — BUPIVACAINE HYDROCHLORIDE AND EPINEPHRINE 2.5; 5 MG/ML; UG/ML
INJECTION, SOLUTION INFILTRATION; PERINEURAL PRN
Status: DISCONTINUED | OUTPATIENT
Start: 2024-06-26 | End: 2024-06-26 | Stop reason: HOSPADM

## 2024-06-26 RX ORDER — CEFAZOLIN SODIUM/WATER 2 G/20 ML
2 SYRINGE (ML) INTRAVENOUS
Status: COMPLETED | OUTPATIENT
Start: 2024-06-26 | End: 2024-06-26

## 2024-06-26 RX ORDER — METOPROLOL TARTRATE 1 MG/ML
1-2 INJECTION, SOLUTION INTRAVENOUS EVERY 5 MIN PRN
Status: DISCONTINUED | OUTPATIENT
Start: 2024-06-26 | End: 2024-06-26 | Stop reason: HOSPADM

## 2024-06-26 RX ORDER — SODIUM CHLORIDE, SODIUM LACTATE, POTASSIUM CHLORIDE, CALCIUM CHLORIDE 600; 310; 30; 20 MG/100ML; MG/100ML; MG/100ML; MG/100ML
INJECTION, SOLUTION INTRAVENOUS CONTINUOUS
Status: DISCONTINUED | OUTPATIENT
Start: 2024-06-26 | End: 2024-06-26 | Stop reason: HOSPADM

## 2024-06-26 RX ORDER — MEPERIDINE HYDROCHLORIDE 25 MG/ML
12.5 INJECTION INTRAMUSCULAR; INTRAVENOUS; SUBCUTANEOUS EVERY 5 MIN PRN
Status: DISCONTINUED | OUTPATIENT
Start: 2024-06-26 | End: 2024-06-26 | Stop reason: HOSPADM

## 2024-06-26 RX ORDER — DEXAMETHASONE SODIUM PHOSPHATE 10 MG/ML
INJECTION, SOLUTION INTRAMUSCULAR; INTRAVENOUS
Status: COMPLETED | OUTPATIENT
Start: 2024-06-26 | End: 2024-06-26

## 2024-06-26 RX ORDER — SODIUM CHLORIDE 9 MG/ML
INJECTION, SOLUTION INTRAVENOUS CONTINUOUS
Status: DISCONTINUED | OUTPATIENT
Start: 2024-06-26 | End: 2024-06-27 | Stop reason: HOSPADM

## 2024-06-26 RX ORDER — NALOXONE HYDROCHLORIDE 0.4 MG/ML
0.1 INJECTION, SOLUTION INTRAMUSCULAR; INTRAVENOUS; SUBCUTANEOUS
Status: DISCONTINUED | OUTPATIENT
Start: 2024-06-26 | End: 2024-06-26 | Stop reason: HOSPADM

## 2024-06-26 RX ORDER — ONDANSETRON 2 MG/ML
INJECTION INTRAMUSCULAR; INTRAVENOUS PRN
Status: DISCONTINUED | OUTPATIENT
Start: 2024-06-26 | End: 2024-06-26

## 2024-06-26 RX ORDER — CEFAZOLIN SODIUM/WATER 2 G/20 ML
2 SYRINGE (ML) INTRAVENOUS SEE ADMIN INSTRUCTIONS
Status: DISCONTINUED | OUTPATIENT
Start: 2024-06-26 | End: 2024-06-26 | Stop reason: HOSPADM

## 2024-06-26 RX ORDER — NALOXONE HYDROCHLORIDE 0.4 MG/ML
0.2 INJECTION, SOLUTION INTRAMUSCULAR; INTRAVENOUS; SUBCUTANEOUS
Status: DISCONTINUED | OUTPATIENT
Start: 2024-06-26 | End: 2024-06-27 | Stop reason: HOSPADM

## 2024-06-26 RX ORDER — DEXAMETHASONE SODIUM PHOSPHATE 10 MG/ML
4 INJECTION, SOLUTION INTRAMUSCULAR; INTRAVENOUS
Status: DISCONTINUED | OUTPATIENT
Start: 2024-06-26 | End: 2024-06-26 | Stop reason: HOSPADM

## 2024-06-26 RX ORDER — ONDANSETRON 2 MG/ML
4 INJECTION INTRAMUSCULAR; INTRAVENOUS EVERY 30 MIN PRN
Status: DISCONTINUED | OUTPATIENT
Start: 2024-06-26 | End: 2024-06-26 | Stop reason: HOSPADM

## 2024-06-26 RX ORDER — HYDROMORPHONE HCL IN WATER/PF 6 MG/30 ML
0.4 PATIENT CONTROLLED ANALGESIA SYRINGE INTRAVENOUS
Status: DISCONTINUED | OUTPATIENT
Start: 2024-06-26 | End: 2024-06-27 | Stop reason: HOSPADM

## 2024-06-26 RX ORDER — ONDANSETRON 2 MG/ML
4 INJECTION INTRAMUSCULAR; INTRAVENOUS EVERY 6 HOURS PRN
Status: DISCONTINUED | OUTPATIENT
Start: 2024-06-26 | End: 2024-06-27 | Stop reason: HOSPADM

## 2024-06-26 RX ORDER — HYDRALAZINE HYDROCHLORIDE 20 MG/ML
2.5-5 INJECTION INTRAMUSCULAR; INTRAVENOUS EVERY 10 MIN PRN
Status: DISCONTINUED | OUTPATIENT
Start: 2024-06-26 | End: 2024-06-26 | Stop reason: HOSPADM

## 2024-06-26 RX ORDER — DIMENHYDRINATE 50 MG/ML
INJECTION, SOLUTION INTRAMUSCULAR; INTRAVENOUS PRN
Status: DISCONTINUED | OUTPATIENT
Start: 2024-06-26 | End: 2024-06-26

## 2024-06-26 RX ORDER — HYDROMORPHONE HYDROCHLORIDE 1 MG/ML
0.4 INJECTION, SOLUTION INTRAMUSCULAR; INTRAVENOUS; SUBCUTANEOUS EVERY 5 MIN PRN
Status: DISCONTINUED | OUTPATIENT
Start: 2024-06-26 | End: 2024-06-26 | Stop reason: HOSPADM

## 2024-06-26 RX ORDER — BUPIVACAINE HYDROCHLORIDE AND EPINEPHRINE 2.5; 5 MG/ML; UG/ML
INJECTION, SOLUTION INFILTRATION; PERINEURAL
Status: COMPLETED | OUTPATIENT
Start: 2024-06-26 | End: 2024-06-26

## 2024-06-26 RX ORDER — ONDANSETRON 4 MG/1
4 TABLET, ORALLY DISINTEGRATING ORAL EVERY 30 MIN PRN
Status: DISCONTINUED | OUTPATIENT
Start: 2024-06-26 | End: 2024-06-26 | Stop reason: HOSPADM

## 2024-06-26 RX ORDER — ONDANSETRON 4 MG/1
4 TABLET, ORALLY DISINTEGRATING ORAL EVERY 6 HOURS PRN
Status: DISCONTINUED | OUTPATIENT
Start: 2024-06-26 | End: 2024-06-27 | Stop reason: HOSPADM

## 2024-06-26 RX ORDER — HYDROMORPHONE HCL IN WATER/PF 6 MG/30 ML
0.2 PATIENT CONTROLLED ANALGESIA SYRINGE INTRAVENOUS
Status: DISCONTINUED | OUTPATIENT
Start: 2024-06-26 | End: 2024-06-27 | Stop reason: HOSPADM

## 2024-06-26 RX ORDER — FENTANYL CITRATE 50 UG/ML
INJECTION, SOLUTION INTRAMUSCULAR; INTRAVENOUS PRN
Status: DISCONTINUED | OUTPATIENT
Start: 2024-06-26 | End: 2024-06-26

## 2024-06-26 RX ORDER — LIDOCAINE HYDROCHLORIDE 10 MG/ML
INJECTION, SOLUTION INFILTRATION; PERINEURAL PRN
Status: DISCONTINUED | OUTPATIENT
Start: 2024-06-26 | End: 2024-06-26

## 2024-06-26 RX ORDER — KETOROLAC TROMETHAMINE 15 MG/ML
15 INJECTION, SOLUTION INTRAMUSCULAR; INTRAVENOUS EVERY 6 HOURS
Status: DISCONTINUED | OUTPATIENT
Start: 2024-06-26 | End: 2024-06-26

## 2024-06-26 RX ORDER — LIDOCAINE 40 MG/G
CREAM TOPICAL
Status: DISCONTINUED | OUTPATIENT
Start: 2024-06-26 | End: 2024-06-27 | Stop reason: HOSPADM

## 2024-06-26 RX ORDER — HYDROXYZINE HYDROCHLORIDE 10 MG/1
10 TABLET, FILM COATED ORAL EVERY 6 HOURS PRN
Status: DISCONTINUED | OUTPATIENT
Start: 2024-06-26 | End: 2024-06-26 | Stop reason: HOSPADM

## 2024-06-26 RX ORDER — BUPIVACAINE HYDROCHLORIDE 2.5 MG/ML
INJECTION, SOLUTION INFILTRATION; PERINEURAL PRN
Status: DISCONTINUED | OUTPATIENT
Start: 2024-06-26 | End: 2024-06-26 | Stop reason: HOSPADM

## 2024-06-26 RX ADMIN — DEXAMETHASONE SODIUM PHOSPHATE 3 MG: 10 INJECTION, SOLUTION INTRAMUSCULAR; INTRAVENOUS at 07:56

## 2024-06-26 RX ADMIN — ONDANSETRON 4 MG: 2 INJECTION INTRAMUSCULAR; INTRAVENOUS at 09:20

## 2024-06-26 RX ADMIN — PHENYLEPHRINE HYDROCHLORIDE 100 MCG: 10 INJECTION INTRAVENOUS at 09:25

## 2024-06-26 RX ADMIN — FENTANYL CITRATE 50 MCG: 50 INJECTION INTRAMUSCULAR; INTRAVENOUS at 07:34

## 2024-06-26 RX ADMIN — PHENYLEPHRINE HYDROCHLORIDE 100 MCG: 10 INJECTION INTRAVENOUS at 09:11

## 2024-06-26 RX ADMIN — ROCURONIUM BROMIDE 30 MG: 50 INJECTION, SOLUTION INTRAVENOUS at 08:53

## 2024-06-26 RX ADMIN — OXYCODONE HYDROCHLORIDE 5 MG: 5 TABLET ORAL at 20:58

## 2024-06-26 RX ADMIN — FAMOTIDINE 20 MG: 20 TABLET, FILM COATED ORAL at 20:54

## 2024-06-26 RX ADMIN — SODIUM CHLORIDE, POTASSIUM CHLORIDE, SODIUM LACTATE AND CALCIUM CHLORIDE: 600; 310; 30; 20 INJECTION, SOLUTION INTRAVENOUS at 07:26

## 2024-06-26 RX ADMIN — FENTANYL CITRATE 50 MCG: 50 INJECTION INTRAMUSCULAR; INTRAVENOUS at 07:26

## 2024-06-26 RX ADMIN — PHENYLEPHRINE HYDROCHLORIDE 100 MCG: 10 INJECTION INTRAVENOUS at 09:18

## 2024-06-26 RX ADMIN — MIDAZOLAM 2 MG: 1 INJECTION INTRAMUSCULAR; INTRAVENOUS at 07:26

## 2024-06-26 RX ADMIN — PHENYLEPHRINE HYDROCHLORIDE 200 MCG: 10 INJECTION INTRAVENOUS at 09:20

## 2024-06-26 RX ADMIN — PROPOFOL 170 MG: 10 INJECTION, EMULSION INTRAVENOUS at 07:34

## 2024-06-26 RX ADMIN — SUGAMMADEX 200 MG: 100 INJECTION, SOLUTION INTRAVENOUS at 09:29

## 2024-06-26 RX ADMIN — PHENYLEPHRINE HYDROCHLORIDE 100 MCG: 10 INJECTION INTRAVENOUS at 08:45

## 2024-06-26 RX ADMIN — LIDOCAINE HYDROCHLORIDE 30 MG: 10 INJECTION, SOLUTION INFILTRATION; PERINEURAL at 07:34

## 2024-06-26 RX ADMIN — PHENYLEPHRINE HYDROCHLORIDE 200 MCG: 10 INJECTION INTRAVENOUS at 09:28

## 2024-06-26 RX ADMIN — FENTANYL CITRATE 50 MCG: 50 INJECTION INTRAMUSCULAR; INTRAVENOUS at 09:01

## 2024-06-26 RX ADMIN — KETOROLAC TROMETHAMINE 15 MG: 15 INJECTION, SOLUTION INTRAMUSCULAR; INTRAVENOUS at 18:26

## 2024-06-26 RX ADMIN — FENTANYL CITRATE 50 MCG: 50 INJECTION INTRAMUSCULAR; INTRAVENOUS at 09:42

## 2024-06-26 RX ADMIN — FENTANYL CITRATE 50 MCG: 50 INJECTION, SOLUTION INTRAMUSCULAR; INTRAVENOUS at 10:15

## 2024-06-26 RX ADMIN — DEXAMETHASONE SODIUM PHOSPHATE 2 MG: 10 INJECTION, SOLUTION INTRAMUSCULAR; INTRAVENOUS at 07:57

## 2024-06-26 RX ADMIN — ROCURONIUM BROMIDE 50 MG: 50 INJECTION, SOLUTION INTRAVENOUS at 07:35

## 2024-06-26 RX ADMIN — KETOROLAC TROMETHAMINE 15 MG: 15 INJECTION, SOLUTION INTRAMUSCULAR; INTRAVENOUS at 12:18

## 2024-06-26 RX ADMIN — DIMENHYDRINATE 25 MG: 50 INJECTION, SOLUTION INTRAMUSCULAR; INTRAVENOUS at 08:33

## 2024-06-26 RX ADMIN — PHENYLEPHRINE HYDROCHLORIDE 100 MCG: 10 INJECTION INTRAVENOUS at 08:47

## 2024-06-26 RX ADMIN — BUPIVACAINE HYDROCHLORIDE AND EPINEPHRINE BITARTRATE 20 ML: 2.5; .005 INJECTION, SOLUTION INFILTRATION; PERINEURAL at 07:51

## 2024-06-26 RX ADMIN — PHENYLEPHRINE HYDROCHLORIDE 200 MCG: 10 INJECTION INTRAVENOUS at 09:16

## 2024-06-26 RX ADMIN — SODIUM CHLORIDE, PRESERVATIVE FREE: 5 INJECTION INTRAVENOUS at 11:38

## 2024-06-26 RX ADMIN — FENTANYL CITRATE 50 MCG: 50 INJECTION INTRAMUSCULAR; INTRAVENOUS at 09:44

## 2024-06-26 RX ADMIN — FAMOTIDINE 20 MG: 20 TABLET, FILM COATED ORAL at 12:17

## 2024-06-26 RX ADMIN — DEXAMETHASONE SODIUM PHOSPHATE 3 MG: 10 INJECTION, SOLUTION INTRAMUSCULAR; INTRAVENOUS at 07:51

## 2024-06-26 RX ADMIN — PHENYLEPHRINE HYDROCHLORIDE 100 MCG: 10 INJECTION INTRAVENOUS at 09:23

## 2024-06-26 RX ADMIN — BUPIVACAINE HYDROCHLORIDE AND EPINEPHRINE BITARTRATE 10 ML: 2.5; .005 INJECTION, SOLUTION INFILTRATION; PERINEURAL at 07:52

## 2024-06-26 RX ADMIN — HYDROMORPHONE HYDROCHLORIDE 0.5 MG: 1 INJECTION, SOLUTION INTRAMUSCULAR; INTRAVENOUS; SUBCUTANEOUS at 08:20

## 2024-06-26 RX ADMIN — FENTANYL CITRATE 25 MCG: 50 INJECTION INTRAMUSCULAR; INTRAVENOUS at 08:36

## 2024-06-26 RX ADMIN — BUPIVACAINE HYDROCHLORIDE AND EPINEPHRINE BITARTRATE 20 ML: 2.5; .005 INJECTION, SOLUTION INFILTRATION; PERINEURAL at 07:56

## 2024-06-26 RX ADMIN — PROPOFOL 100 MCG/KG/MIN: 10 INJECTION, EMULSION INTRAVENOUS at 07:34

## 2024-06-26 RX ADMIN — Medication 2 G: at 07:26

## 2024-06-26 RX ADMIN — BUPIVACAINE HYDROCHLORIDE AND EPINEPHRINE BITARTRATE 10 ML: 2.5; .005 INJECTION, SOLUTION INFILTRATION; PERINEURAL at 07:57

## 2024-06-26 RX ADMIN — FENTANYL CITRATE 25 MCG: 50 INJECTION INTRAMUSCULAR; INTRAVENOUS at 08:39

## 2024-06-26 RX ADMIN — DEXAMETHASONE SODIUM PHOSPHATE 2 MG: 10 INJECTION, SOLUTION INTRAMUSCULAR; INTRAVENOUS at 07:52

## 2024-06-26 ASSESSMENT — ACTIVITIES OF DAILY LIVING (ADL)
ADLS_ACUITY_SCORE: 20
ADLS_ACUITY_SCORE: 37
ADLS_ACUITY_SCORE: 37
ADLS_ACUITY_SCORE: 22
DOING_ERRANDS_INDEPENDENTLY_DIFFICULTY: NO
ADLS_ACUITY_SCORE: 22
ADLS_ACUITY_SCORE: 21
ADLS_ACUITY_SCORE: 37
DIFFICULTY_EATING/SWALLOWING: NO
ADLS_ACUITY_SCORE: 22
ADLS_ACUITY_SCORE: 21
ADLS_ACUITY_SCORE: 22
HEARING_DIFFICULTY_OR_DEAF: NO
WEAR_GLASSES_OR_BLIND: NO
ADLS_ACUITY_SCORE: 21
ADLS_ACUITY_SCORE: 22
FALL_HISTORY_WITHIN_LAST_SIX_MONTHS: NO
ADLS_ACUITY_SCORE: 37
CHANGE_IN_FUNCTIONAL_STATUS_SINCE_ONSET_OF_CURRENT_ILLNESS/INJURY: NO
CONCENTRATING,_REMEMBERING_OR_MAKING_DECISIONS_DIFFICULTY: NO
DIFFICULTY_COMMUNICATING: NO
ADLS_ACUITY_SCORE: 35
ADLS_ACUITY_SCORE: 22
DRESSING/BATHING_DIFFICULTY: NO
TOILETING_ISSUES: NO
WALKING_OR_CLIMBING_STAIRS_DIFFICULTY: NO
ADLS_ACUITY_SCORE: 20
ADLS_ACUITY_SCORE: 37
ADLS_ACUITY_SCORE: 22

## 2024-06-26 ASSESSMENT — LIFESTYLE VARIABLES: TOBACCO_USE: 1

## 2024-06-26 NOTE — OP NOTE
[facility] Operative Note    Pre-operative diagnosis: Malignant neoplasm of central portion of left female breast, unspecified estrogen receptor status (H) [C50.112]  FHx: breast cancer in first degree relative [Z80.3]   Post-operative diagnosis: Same   Procedure: Procedure(s):  Bilateral mastectomy   Surgeon: Philippe Esquivel MD, FACS   Assistant(s): Luciana Arora  PA-C  assist was needed for positioning/prepping, visualization and bleeding management by retraction, suctioning, and suturing/closure.     Anesthesia: General endotracheal anesthesia   Estimated blood loss: Less than 100 ml   Total IV fluids: (See anesthesia record)   Blood transfusion: No transfusion was given during surgery   Total urine output: (See anesthesia record)   Drains: Efren-Pacheco   Specimens: Left and right breast.  Superior marked with black stitch and lateral devonte with white stitch   Implants: None   Findings: Old scar in left breast.   Complications: None   Condition: Stable   Comments: Indications for procedure: This is a 64-year-old lady who was originally seen for left breast cancer.  She will underwent a lumpectomy with sentinel node biopsy.  One of the margins on the lumpectomy was positive.  She returned the options of reexcision versus mastectomy were discussed.  There is also an extensive discussion had with oncology.  Because of her family history and other anxiety she opted to have a bilateral mastectomy.  Reconstruction was offered but the patient declined.         Details of procedure:  With the cooperation the patient in the preop holding her both breasts were marked.  She was then taken the operating room and both breasts were prepped and draped in sterile fashion.  Timeout was performed confirm the identity the patient as well as the procedure performed.  The cancer free breast was initially operated on.  An elliptical incision was made around the nipple areolar complex of the right breast.  A superior  flap to the clavicle was made using cautery and an inferior flap was created to the inframammary crease.  The breast was then taken off the muscle using cautery all the way to the mid axillary line.  It was then submitted a specimen and marked as stated above.  Hemostasis was achieved using cautery.  There was separate stab incision on the lateral inframammary crease of the #10 Efren-Pacheco drain was brought out.  Related to the space.  The flaps were then closed using 3-0 Vicryl and 4-0 Monocryl subcuticular stitches.  We then turned our attention to the left breast.  A similar incision was then made encompassing more inferiorly her old lumpectomy site.  This was done with a 10 blade.  A superior flap was created using cautery elevated the clavicle then an inferior flap was extended below the inframammary crease to ensure to include all her previous pathology.  The breast was then taken off the chest wall using cautery all the way to the mid axillary line.  It was marked as stated above.  It was submitted a specimen.  Again hemostasis was achieved using cautery.  The area was washed out and all fluid was suctioned out.  There was separate stab incision a #10 Efren-Pacheco drain was placed into the space.  It was then closed using interrupted 3-0 Vicryl and 4-0 Monocryl.  Both drains were secured using 2-0 silk.  Dermabond glue was then applied followed by pressure dressings.  She was then taken from the OR to recovery in stable condition to be admitted to the floor.          Philippe Esquivel MD, FACS

## 2024-06-26 NOTE — ANESTHESIA CARE TRANSFER NOTE
Patient: Jinny Smith    Procedure: Procedure(s):  Bilateral mastectomy       Diagnosis: Malignant neoplasm of central portion of left female breast, unspecified estrogen receptor status (H) [C50.112]  FHx: breast cancer in first degree relative [Z80.3]  Diagnosis Additional Information: No value filed.    Anesthesia Type:   General     Note:    Oropharynx: oropharynx clear of all foreign objects and spontaneously breathing  Level of Consciousness: drowsy  Oxygen Supplementation: face mask    Independent Airway: airway patency satisfactory and stable  Dentition: dentition unchanged  Vital Signs Stable: post-procedure vital signs reviewed and stable  Report to RN Given: handoff report given  Patient transferred to: PACU    Handoff Report: Identifed the Patient, Identified the Reponsible Provider, Reviewed the pertinent medical history, Discussed the surgical course, Reviewed Intra-OP anesthesia mangement and issues during anesthesia, Set expectations for post-procedure period and Allowed opportunity for questions and acknowledgement of understanding      Vitals:  Vitals Value Taken Time   /86 06/26/24 0950   Temp 97.88  F (36.6  C) 06/26/24 0954   Pulse 111 06/26/24 0954   Resp 16 06/26/24 0954   SpO2 97 % 06/26/24 0954   Vitals shown include unfiled device data.    Electronically Signed By: JOANNA Echavarria CRNA  June 26, 2024  9:56 AM

## 2024-06-26 NOTE — PROGRESS NOTES
S-(situation): Patient arrives to room 255 via cart from PACU    B-(background): bilateral mastectomy    A-(assessment): vss, RA, pain 4/10, A&O    R-(recommendations): Orders reviewed with patient. Will monitor patient per MD orders.     Inpatient nursing criteria listed below were met:    Health care directives status obtained and documented: Yes  VTE ordered/documented: Yes  Skin issues/needs documented:Yes  Isolation addressed and Signage used: NA  Fall Prevention: Care plan updated NA Education given and documented NA  Care Plan initiated and Co-Morbidities added: Yes  Education Assessment documented:Yes  Admission Education Documented: Yes  If present CAUTI/CLABI Education done: Yes  New medication patient education completed and documented (Possible Side Effects of Common Medications handout): Yes  Allergies Reviewed: Yes  Admission Medication Reconciliation completed: Yes  Home medications if not able to send immediately home with family stored here: NA  Reminder note placed in discharge instructions regarding home meds: NA  Individualized care needs/preferences addressed and charted: Yes  Provider Notified that patient has arrived to the unit: Yes

## 2024-06-26 NOTE — ANESTHESIA PROCEDURE NOTES
Pectoralis Procedure Note    Pre-Procedure   Staff -        CRNA: Marlo Hester APRN CRNA       Other Anesthesia Staff: Janki Reyes       Performed By: MALGORZATA and CRNA       Location: OR       Procedure Start/Stop Times: 6/26/2024 7:45 AM and 6/26/2024 7:58 AM       Pre-Anesthestic Checklist: patient identified, IV checked, site marked, risks and benefits discussed, informed consent, monitors and equipment checked, pre-op evaluation, at physician/surgeon's request and post-op pain management  Timeout:       Correct Patient: Yes        Correct Procedure: Yes        Correct Site: Yes        Correct Position: Yes        Correct Laterality: Yes        Site Marked: Yes  Procedure Documentation  Procedure: Pectoralis             Pectoralis I and Pectoralis II       Diagnosis: BREAST CANCER       Laterality: bilateral       Patient Position: supine       Skin prep: Chloraprep       Needle Type: insulated       Needle Gauge: 21.        Needle Length (millimeters): 100        Ultrasound guided       1. Ultrasound was used to identify targeted nerve, plexus, vascular marker, or fascial plane and place a needle adjacent to it in real-time.       2. Ultrasound was used to visualize the spread of anesthetic in close proximity to the above referenced structure.       3. A permanent image is entered into the patient's record.       4. The visualized anatomic structures appeared normal.       5. There were no apparent abnormal pathologic findings.    Assessment/Narrative         The placement was negative for: blood aspirated and site bleeding       Bolus given via needle..        Secured via.        Insertion/Infusion Method: Single Shot       Complications: none       Injection made incrementally with aspirations every 5 mL.    Medication(s) Administered   Bupivacaine 0.25% w/ 1:200K Epi (Injection) - Injection   20 mL - 6/26/2024 7:51:00 AM   10 mL - 6/26/2024 7:52:00 AM   20 mL - 6/26/2024 7:56:00 AM   10 mL -  "6/26/2024 7:57:00 AM  Dexamethasone 10 mg/mL PF (Perineural) - Perineural   3 mg - 6/26/2024 7:51:00 AM   2 mg - 6/26/2024 7:52:00 AM   3 mg - 6/26/2024 7:56:00 AM   2 mg - 6/26/2024 7:57:00 AM  Medication Administration Time: 6/26/2024 7:45 AM     Comments:  Block done after pt induced as painful block that is a plane block vs blocking specific nerve bundles.  Placed by the SRNA under the supervision of the CRNA.  While the layers and fluid dissection of the fascial planes was good, to obtain adequate muscle layers the probe had to be placed more medial than ideal due to the pt's body habitus.  HR increased less than 20% with the epi in the deep fascial layers.  Spread was observed and was not intravascular throughout.      FOR [facility] ([facility]) ONLY:   Pain Team Contact information: please page the Pain Team Via [employer]. Search \"Pain\". During daytime hours, please page the attending first. At night please page the resident first.      "

## 2024-06-26 NOTE — PROGRESS NOTES
,Transfer from PACU to Room [facility] 255  Transferred to bed via Glyder mat (Glyder Mat,Transfer Boar,Slider Sheet)      S: 65 y/o female S/P bilateral mastectomy  Anesthesia Type:General with a block  Surgeon: Dr. Esquivel  Allergies: See Medication Reconciliation Record    B: Pertinent Medical History:   Past Medical History:   Diagnosis Date    Family history of colon cancer     Hyperlipidemia LDL goal <130 8/28/2012    Hyperplastic colonic polyp     Hypertension goal BP (blood pressure) < 140/80 8/28/2012    Knee joint effusion 8/28/2012       Surgical History:   Past Surgical History:   Procedure Laterality Date    BIOPSY BREAST NEEDLE LOCALIZATION, BIOPSY NODE SENTINEL, COMBINED Left 3/27/2024    Procedure: BIOPSY, LEFT BREAST, WITH NEEDLE LOCALIZATION AND SENTINEL LYMPH NODE BIOPSY;  Surgeon: Philippe Esquivel MD;  Location[facility]    GYN SURGERY      HYSTERECTOMY TOTAL ABDOMINAL      LAPAROSCOPY DIAGNOSTIC (GENERAL) N/A 1/2/2024    Procedure: LAPAROSCOPY, DIAGNOSTIC, laparoscopic lysis of adhesions;  Surgeon: Philippe Esquivel MD;  Location[facility]    LAPAROTOMY, LYSIS ADHESIONS, COMBINED N/A 1/2/2024    Procedure: LAPAROTOMY, EXPLORATORY, WITH open LYSIS OF ADHESIONS, reduction of internal hernia;  Surgeon: Philippe Esquivel MD;  Location: [facility] OR       DNR/DNI FULL CODE     A: EBL: 75 ml  IVF: 2000 ml  UOP: none  NPO: ___Yes ___No   Vomiting: ___Yes ___No   Drainage: Incisions CDI. Bilateral ARACELI drains to chest with red, bloody drainage. 30 ml of output each  Skin Integrity: see above. Ice packs to bilateral chest (Normal; Pressure Ulcer (Location)  Pain: 3  On DVPRS Scale  See PACU record for ongoing assessment, vital signs and pain assessment.    RFO (Retained Foreign Object) ___Yes___No (identify item if present)   Brace/sling/equipment: ___Yes___No (identify item if present)    Report Given to:  Josiane Jorge RN    R: Post-Op vitals and assessments as ordered/indicated per patient's condition.  Follow  Post-Op orders and notify Physician prn.  Continue to involve patient/family in plan of care and discharge planning.  Reinforce Pre-Operative education.  Implement skin safety interventions as appropriate.    C

## 2024-06-26 NOTE — ANESTHESIA PROCEDURE NOTES
Airway       Patient location during procedure: OR  Staff -        CRNA: Marlo Hester APRN CRNA       Other Anesthesia Staff: Janki Reyes       Performed By: CRNA and SRNA  Consent for Airway        Urgency: elective  Indications and Patient Condition       Indications for airway management: [place]-procedural       Induction type:intravenous       Mask difficulty assessment: 1 - vent by mask    Final Airway Details       Final airway type: endotracheal airway       Successful airway: ETT - single  Endotracheal Airway Details        ETT size (mm): 6.5       Cuffed: yes       Successful intubation technique: direct laryngoscopy       DL Blade Type: MAC 3       Grade View of Cords: 1       Adjucts: stylet       Position: Right       Measured from: gums/teeth       Secured at (cm): 22       Bite block used: Oral Airway    Post intubation assessment        Placement verified by: capnometry, equal breath sounds and chest rise        Number of attempts at approach: 1       Number of other approaches attempted: 0       Secured with: plastic tape       Ease of procedure: easy       Dentition: Intact and Unchanged

## 2024-06-26 NOTE — ANESTHESIA PREPROCEDURE EVALUATION
Anesthesia Pre-Procedure Evaluation    Patient: Jinny Smith   MRN: [id number] : 1960        Procedure : Procedure(s):  Bilateral mastectomy          Past Medical History:   Diagnosis Date     Family history of colon cancer      Hyperlipidemia LDL goal <130 2012     Hyperplastic colonic polyp      Hypertension goal BP (blood pressure) < 140/80 2012     Knee joint effusion 2012      Past Surgical History:   Procedure Laterality Date     BIOPSY BREAST NEEDLE LOCALIZATION, BIOPSY NODE SENTINEL, COMBINED Left 3/27/2024    Procedure: BIOPSY, LEFT BREAST, WITH NEEDLE LOCALIZATION AND SENTINEL LYMPH NODE BIOPSY;  Surgeon: Philippe Esquivel MD;  Location: [facility]     GYN SURGERY       HYSTERECTOMY TOTAL ABDOMINAL       LAPAROSCOPY DIAGNOSTIC (GENERAL) N/A 2024    Procedure: LAPAROSCOPY, DIAGNOSTIC, laparoscopic lysis of adhesions;  Surgeon: Philippe Esquivel MD;  Location: [facility]     LAPAROTOMY, LYSIS ADHESIONS, COMBINED N/A 2024    Procedure: LAPAROTOMY, EXPLORATORY, WITH open LYSIS OF ADHESIONS, reduction of internal hernia;  Surgeon: Philippe Esquivel MD;  Location: [facility]      Allergies   Allergen Reactions     Lorazepam Itching and Rash     Rash on chest and upper neck after 20 minutes. Did also get itchy.      Social History     Tobacco Use     Smoking status: Former     Current packs/day: 0.00     Types: Cigarettes     Quit date: 2023     Years since quittin.4     Smokeless tobacco: Former     Quit date: 2013   Substance Use Topics     Alcohol use: Yes      Wt Readings from Last 1 Encounters:   24 78.5 kg (173 lb)        Anesthesia Evaluation   Pt has had prior anesthetic. Type: General.    No history of anesthetic complications       ROS/MED HX  ENT/Pulmonary:     (+)                tobacco use, Current use,                       Neurologic:  - neg neurologic ROS     Cardiovascular:     (+)  hypertension- -   -  - -                                 Previous cardiac  "testing   Echo: Date: Results:    Stress Test:  Date: Results:    ECG Reviewed:  Date: 12/31/23 Results:  - NSR    Cath:  Date: Results:      METS/Exercise Tolerance:     Hematologic:  - neg hematologic  ROS     Musculoskeletal:  - neg musculoskeletal ROS     GI/Hepatic:  - neg GI/hepatic ROS  (-) GERD   Renal/Genitourinary:  - neg Renal ROS     Endo:     (+)               Obesity,       Psychiatric/Substance Use:     (+) psychiatric history anxiety       Infectious Disease:  - neg infectious disease ROS     Malignancy:   (+) Malignancy, History of Breast.Breast CA Active status post.      Other:  - neg other ROS          Physical Exam    Airway        Mallampati: II   TM distance: > 3 FB   Neck ROM: full   Mouth opening: > 3 cm    Respiratory Devices and Support         Dental     Comment: Missing posterior teeth    (+) Minor Abnormalities - some fillings, tiny chips      Cardiovascular   cardiovascular exam normal       Rhythm and rate: regular and normal     Pulmonary   pulmonary exam normal        breath sounds clear to auscultation         OUTSIDE LABS:  CBC:   Lab Results   Component Value Date    WBC 9.1 06/03/2024    WBC 7.9 03/15/2024    HGB 14.1 06/03/2024    HGB 13.4 03/15/2024    HCT 42.7 06/03/2024    HCT 40.8 03/15/2024     06/03/2024     03/15/2024     BMP:   Lab Results   Component Value Date     06/03/2024     03/15/2024    POTASSIUM 5.2 06/03/2024    POTASSIUM 4.1 03/15/2024    CHLORIDE 101 06/03/2024    CHLORIDE 101 03/15/2024    CO2 21 (L) 06/03/2024    CO2 23 03/15/2024    BUN 28.2 (H) 06/03/2024    BUN 13.9 03/15/2024    CR 0.76 06/03/2024    CR 0.70 03/15/2024     (H) 06/03/2024     (H) 03/15/2024     COAGS:   Lab Results   Component Value Date    INR 1.11 01/02/2024     POC: No results found for: \"BGM\", \"HCG\", \"HCGS\"  HEPATIC:   Lab Results   Component Value Date    ALBUMIN 4.7 03/15/2024    PROTTOTAL 8.4 (H) 03/15/2024    ALT 15 03/15/2024    AST 19 " 03/15/2024    ALKPHOS 95 03/15/2024    BILITOTAL 0.5 03/15/2024     OTHER:   Lab Results   Component Value Date    LACT 1.1 12/31/2023    A1C 5.6 03/15/2024    KAREN 9.9 06/03/2024    MAG 1.8 01/05/2024    LIPASE 18 12/31/2023    TSH 1.07 03/15/2024    CRP 7.1 08/10/2012    SED 9 08/10/2012       Anesthesia Plan    ASA Status:  2    NPO Status:  NPO Appropriate    Anesthesia Type: General.     - Airway: ETT   Induction: Intravenous, Propofol.   Maintenance: Balanced.        Consents    Anesthesia Plan(s) and associated risks, benefits, and realistic alternatives discussed. Questions answered and patient/representative(s) expressed understanding.     - Discussed:     - Discussed with:  Patient      - Extended Intubation/Ventilatory Support Discussed: No.      - Patient is DNR/DNI Status: No     Use of blood products discussed: Yes.     - Discussed with: Patient.     - Consented: consented to blood products     Postoperative Care    Pain management: IV analgesics, Oral pain medications, Peripheral nerve block (Single Shot).     - Plan for long acting post-op opioid use   PONV prophylaxis: Ondansetron (or other 5HT-3), Meclizine or Dimenhydrinate, Background Propofol Infusion     Comments:    Other Comments: Will place bilateral pec major and minor nerve blocks for post-op pain.  These will be injected prior to surgery as post-op would be difficult with disturbed fascia planes.    The risks and benefits of anesthesia, and the alternatives where applicable, have been discussed with the patient, and they wish to proceed.              JOANNA Echavarria CRNA    I have reviewed the pertinent notes and labs in the chart from the past 30 days and (re)examined the patient.  Any updates or changes from those notes are reflected in this note.

## 2024-06-27 VITALS
BODY MASS INDEX: 31.13 KG/M2 | DIASTOLIC BLOOD PRESSURE: 77 MMHG | HEART RATE: 78 BPM | OXYGEN SATURATION: 96 % | TEMPERATURE: 98.2 F | SYSTOLIC BLOOD PRESSURE: 145 MMHG | RESPIRATION RATE: 16 BRPM | WEIGHT: 175.71 LBS

## 2024-06-27 PROBLEM — G89.18 POST-OP PAIN: Status: ACTIVE | Noted: 2024-06-27

## 2024-06-27 PROCEDURE — 250N000013 HC RX MED GY IP 250 OP 250 PS 637: Performed by: SPECIALIST

## 2024-06-27 PROCEDURE — 250N000011 HC RX IP 250 OP 636: Mod: JZ | Performed by: SPECIALIST

## 2024-06-27 RX ADMIN — KETOROLAC TROMETHAMINE 15 MG: 15 INJECTION, SOLUTION INTRAMUSCULAR; INTRAVENOUS at 06:44

## 2024-06-27 RX ADMIN — OXYCODONE HYDROCHLORIDE 5 MG: 5 TABLET ORAL at 06:51

## 2024-06-27 RX ADMIN — KETOROLAC TROMETHAMINE 15 MG: 15 INJECTION, SOLUTION INTRAMUSCULAR; INTRAVENOUS at 00:28

## 2024-06-27 RX ADMIN — FAMOTIDINE 20 MG: 20 TABLET, FILM COATED ORAL at 08:17

## 2024-06-27 ASSESSMENT — ACTIVITIES OF DAILY LIVING (ADL)
ADLS_ACUITY_SCORE: 22

## 2024-06-27 NOTE — PROGRESS NOTES
S: Patient discharged to home via w/c with family    B: Observation goals met     A: Pain controlled with oxycodone. VSS, afebrile. Ambulating in room, voiding well, tolerating PO diet, alert and oriented, cooperative. Lungs clear. Incisions sites and ARACELI X 2 intact, clean and dry.     R: Discharge instructions reviewed with patient. Listed belongings gathered and returned to patient.  Patient Education resolved: Yes  New medications-Pt. Has been educated about reason of use and side effects Yes  Home medications returned to patient NA  Medication Bin checked and emptied on discharge Yes

## 2024-06-27 NOTE — PROGRESS NOTES
Surgery POD #1    Subjective:  Patient feels good.  Pain controlled.  Ready to go home.  She is receiving drain care teaching.      Objective:  B/P: 145/77, T: 98.2, P: 78, R: 16  Drain output-90 total for both    Chest: Some ecchymoses.  Drains in place.      Assessment/plan:  This is a 64-year-old a status post bilateral mastectomy.  Doing well.  She can go home today and follow-up with me as scheduled.  She knows she can call to get her drains pulled before she is scheduled to see me.    Philippe Esquivel MD, FACS

## 2024-06-27 NOTE — PLAN OF CARE
Goal Outcome Evaluation:      Plan of Care Reviewed With: patient    Overall Patient Progress: improving    Outcome Evaluation: Pt A&O x4, VSS on RA. Pt pain rated between 2-5, managed with scheduled toradol and 5 mg PRN oxycodone. ARACELI drains with adequate red output. Pt emptied herself when awake. Scant drainage on ABDs placed over incisions by previous nurse. Removed in AM.

## 2024-06-28 NOTE — ANESTHESIA POSTPROCEDURE EVALUATION
Patient: Jinny Smith    Procedure: Procedure(s):  Bilateral mastectomy       Anesthesia Type:  General    Note:  Disposition: Outpatient   Postop Pain Control: Uneventful            Sign Out: Well controlled pain   PONV: No   Neuro/Psych: Uneventful            Sign Out: Acceptable/Baseline neuro status   Airway/Respiratory: Uneventful            Sign Out: Acceptable/Baseline resp. status   CV/Hemodynamics: Uneventful            Sign Out: Acceptable CV status   Other NRE: NONE   DID A NON-ROUTINE EVENT OCCUR? No    Event details/Postop Comments:  Pt was happy with anesthesia care.  No complications.  I will follow up with the pt if needed.       Last vitals:  Vitals Value Taken Time   /69 06/26/24 1100   Temp 98.2  F (36.8  C) 06/26/24 1100   Pulse 93 06/26/24 1100   Resp 17 06/26/24 1100   SpO2 94 % 06/26/24 1100       Electronically Signed By: JOANNA Jensen CRNA  June 28, 2024  5:11 AM

## 2024-07-01 ENCOUNTER — TELEPHONE (OUTPATIENT)
Dept: SURGERY | Facility: CLINIC | Age: 64
End: 2024-07-01
Payer: MEDICARE

## 2024-07-01 NOTE — TELEPHONE ENCOUNTER
[facility] Call Center    Phone Message    May a detailed message be left on voicemail: yes     Reason for Call: Other: Patient called to make appt to remove drainage tubes on 7/2/24 or 7/3/24.  Please follow up with patient.     Action Taken: Message routed to:  [facility] ([facility] Patient Care Specialty Pool    Travel Screening: Not Applicable

## 2024-07-01 NOTE — TELEPHONE ENCOUNTER
Patient scheduled for tomorrow with specialty RN for ARACELI drain removal. Patient has been getting 30 ml's or less a day.    Yumiko Bell, RN on 7/1/2024 at 1:36 PM

## 2024-07-02 ENCOUNTER — ALLIED HEALTH/NURSE VISIT (OUTPATIENT)
Dept: FAMILY MEDICINE | Facility: CLINIC | Age: 64
End: 2024-07-02
Payer: MEDICARE

## 2024-07-02 DIAGNOSIS — Z48.03 ENCOUNTER FOR CHANGE OR REMOVAL OF DRAINS: Primary | ICD-10-CM

## 2024-07-02 LAB
PATH REPORT.COMMENTS IMP SPEC: NORMAL
PATH REPORT.COMMENTS IMP SPEC: NORMAL
PATH REPORT.FINAL DX SPEC: NORMAL
PATH REPORT.GROSS SPEC: NORMAL
PATH REPORT.MICROSCOPIC SPEC OTHER STN: NORMAL
PATH REPORT.RELEVANT HX SPEC: NORMAL
PHOTO IMAGE: NORMAL

## 2024-07-02 PROCEDURE — 99207 PR NO CHARGE NURSE ONLY: CPT

## 2024-07-02 NOTE — PROGRESS NOTES
DRAIN REMOVAL     Patient seen per the order of Dr. Esquivel.     Drain:  Location: Bilateral Masectomy    Type: ARACELI    Amount of drainage present prior to removal: 10 mL in right, 3 mL in left.      Action & Treatment order per doctor: Previous dressing was removed noting a small amount of drainage. Drain was removed without incident and tubing was intact upon removal. Patient tolerated well. Drain site was cleansed with  Iodine . Applied gauze and tape over drain site.     Patient verbalized understanding of treatment plan.     Patient was advised she may start showering.      Katy Mantilla RN on 7/2/2024 at 1:28 PM

## 2024-07-08 ENCOUNTER — OFFICE VISIT (OUTPATIENT)
Dept: SURGERY | Facility: CLINIC | Age: 64
End: 2024-07-08
Payer: MEDICARE

## 2024-07-08 VITALS
BODY MASS INDEX: 31.42 KG/M2 | TEMPERATURE: 97.9 F | DIASTOLIC BLOOD PRESSURE: 78 MMHG | OXYGEN SATURATION: 97 % | SYSTOLIC BLOOD PRESSURE: 146 MMHG | HEART RATE: 107 BPM | WEIGHT: 177.4 LBS

## 2024-07-08 DIAGNOSIS — Z09 POSTOP CHECK: Primary | ICD-10-CM

## 2024-07-08 PROCEDURE — 99024 POSTOP FOLLOW-UP VISIT: CPT | Performed by: SPECIALIST

## 2024-07-08 ASSESSMENT — PAIN SCALES - GENERAL: PAINLEVEL: NO PAIN (1)

## 2024-07-08 NOTE — PROGRESS NOTES
Follow-up for bilateral mastectomy      Subjective:  Patient feels good.  No concerning complaints or concerns.    Objective:  B/P: 146/78, T: 97.9, P: 107, R: Data Unavailable  Incisions healing well, some residual dogear with underlying seroma.  Drains are out.        Path:  A.  Breast, right, mastectomy:  -Focal atypical ductal hyperplasia, and fibrocystic change including sclerosing adenosis.     B.  Breast, left, mastectomy:  -Focal atypical ductal hyperplasia, fibroadenomatoid changes and fibrocystic changes including sclerosing adenosis.  -Prior surgical site changes.    Assessment/plan:  Patient is status post a bilateral mastectomy.  No evidence of residual carcinoma.  Some residual dogears at the end of the incision with underlying seroma.  Will observe for now.  Can do scar revision down the road if becomes bothersome.  Will wait until she completes any further medical oncology treatment.  She will follow-up with me as necessary.    Philippe Esquivel MD, FACS

## 2024-07-08 NOTE — LETTER
7/8/2024      Jinny Smith  [address]  [place] [facility] [address]      Dear Colleague,    Thank you for referring your patient, Jinny Smith, to the [facility]. Please see a copy of my visit note below.    Follow-up for bilateral mastectomy      Subjective:  Patient feels good.  No concerning complaints or concerns.    Objective:  B/P: 146/78, T: 97.9, P: 107, R: Data Unavailable  Incisions healing well, some residual dogear with underlying seroma.  Drains are out.        Path:  A.  Breast, right, mastectomy:  -Focal atypical ductal hyperplasia, and fibrocystic change including sclerosing adenosis.     B.  Breast, left, mastectomy:  -Focal atypical ductal hyperplasia, fibroadenomatoid changes and fibrocystic changes including sclerosing adenosis.  -Prior surgical site changes.    Assessment/plan:  Patient is status post a bilateral mastectomy.  No evidence of residual carcinoma.  Some residual dogears at the end of the incision with underlying seroma.  Will observe for now.  Can do scar revision down the road if becomes bothersome.  Will wait until she completes any further medical oncology treatment.  She will follow-up with me as necessary.    Philippe Esquivel MD, FACS      Again, thank you for allowing me to participate in the care of your patient.        Sincerely,        Philippe Esquivel MD

## 2024-07-09 ENCOUNTER — LAB (OUTPATIENT)
Dept: LAB | Facility: CLINIC | Age: 64
End: 2024-07-09
Payer: MEDICARE

## 2024-07-09 ENCOUNTER — ONCOLOGY VISIT (OUTPATIENT)
Dept: ONCOLOGY | Facility: CLINIC | Age: 64
End: 2024-07-09
Payer: MEDICARE

## 2024-07-09 VITALS
WEIGHT: 175 LBS | HEART RATE: 94 BPM | TEMPERATURE: 97.2 F | BODY MASS INDEX: 31 KG/M2 | SYSTOLIC BLOOD PRESSURE: 148 MMHG | DIASTOLIC BLOOD PRESSURE: 86 MMHG | OXYGEN SATURATION: 97 %

## 2024-07-09 DIAGNOSIS — D64.9 NORMOCYTIC ANEMIA: ICD-10-CM

## 2024-07-09 DIAGNOSIS — C50.112 MALIGNANT NEOPLASM OF CENTRAL PORTION OF LEFT FEMALE BREAST, UNSPECIFIED ESTROGEN RECEPTOR STATUS (H): Primary | ICD-10-CM

## 2024-07-09 DIAGNOSIS — R63.39 OTHER FEEDING DIFFICULTIES: ICD-10-CM

## 2024-07-09 DIAGNOSIS — C50.112 MALIGNANT NEOPLASM OF CENTRAL PORTION OF LEFT FEMALE BREAST, UNSPECIFIED ESTROGEN RECEPTOR STATUS (H): ICD-10-CM

## 2024-07-09 DIAGNOSIS — Z86.39 HISTORY OF HYPERLIPIDEMIA: ICD-10-CM

## 2024-07-09 LAB
ALBUMIN SERPL BCG-MCNC: 4.5 G/DL (ref 3.5–5.2)
ALP SERPL-CCNC: 72 U/L (ref 40–150)
ALT SERPL W P-5'-P-CCNC: 34 U/L (ref 0–50)
ANION GAP SERPL CALCULATED.3IONS-SCNC: 12 MMOL/L (ref 7–15)
AST SERPL W P-5'-P-CCNC: 22 U/L (ref 0–45)
BASOPHILS # BLD AUTO: 0.1 10E3/UL (ref 0–0.2)
BASOPHILS NFR BLD AUTO: 1 %
BILIRUB SERPL-MCNC: 0.3 MG/DL
BUN SERPL-MCNC: 19.8 MG/DL (ref 8–23)
CALCIUM SERPL-MCNC: 9.9 MG/DL (ref 8.8–10.2)
CHLORIDE SERPL-SCNC: 105 MMOL/L (ref 98–107)
CREAT SERPL-MCNC: 0.81 MG/DL (ref 0.51–0.95)
DEPRECATED HCO3 PLAS-SCNC: 24 MMOL/L (ref 22–29)
EGFRCR SERPLBLD CKD-EPI 2021: 80 ML/MIN/1.73M2
EOSINOPHIL # BLD AUTO: 0.5 10E3/UL (ref 0–0.7)
EOSINOPHIL NFR BLD AUTO: 5 %
ERYTHROCYTE [DISTWIDTH] IN BLOOD BY AUTOMATED COUNT: 13.5 % (ref 10–15)
FERRITIN SERPL-MCNC: 320 NG/ML (ref 11–328)
GLUCOSE SERPL-MCNC: 95 MG/DL (ref 70–99)
HCT VFR BLD AUTO: 34.8 % (ref 35–47)
HGB BLD-MCNC: 11.6 G/DL (ref 11.7–15.7)
IMM GRANULOCYTES # BLD: 0.1 10E3/UL
IMM GRANULOCYTES NFR BLD: 1 %
IRON BINDING CAPACITY (ROCHE): 390 UG/DL (ref 240–430)
IRON SATN MFR SERPL: 21 % (ref 15–46)
IRON SERPL-MCNC: 81 UG/DL (ref 37–145)
LDH SERPL L TO P-CCNC: 195 U/L (ref 0–250)
LYMPHOCYTES # BLD AUTO: 2.9 10E3/UL (ref 0.8–5.3)
LYMPHOCYTES NFR BLD AUTO: 30 %
MCH RBC QN AUTO: 32.5 PG (ref 26.5–33)
MCHC RBC AUTO-ENTMCNC: 33.3 G/DL (ref 31.5–36.5)
MCV RBC AUTO: 98 FL (ref 78–100)
MONOCYTES # BLD AUTO: 0.7 10E3/UL (ref 0–1.3)
MONOCYTES NFR BLD AUTO: 7 %
NEUTROPHILS # BLD AUTO: 5.4 10E3/UL (ref 1.6–8.3)
NEUTROPHILS NFR BLD AUTO: 56 %
NRBC # BLD AUTO: 0 10E3/UL
NRBC BLD AUTO-RTO: 0 /100
PLATELET # BLD AUTO: 261 10E3/UL (ref 150–450)
POTASSIUM SERPL-SCNC: 5.4 MMOL/L (ref 3.4–5.3)
PROT SERPL-MCNC: 7.8 G/DL (ref 6.4–8.3)
RBC # BLD AUTO: 3.57 10E6/UL (ref 3.8–5.2)
RETICS # AUTO: 0.11 10E6/UL (ref 0.03–0.1)
RETICS/RBC NFR AUTO: 3 % (ref 0.5–2)
SODIUM SERPL-SCNC: 141 MMOL/L (ref 135–145)
TSH SERPL DL<=0.005 MIU/L-ACNC: 0.89 UIU/ML (ref 0.3–4.2)
WBC # BLD AUTO: 9.6 10E3/UL (ref 4–11)

## 2024-07-09 PROCEDURE — 82728 ASSAY OF FERRITIN: CPT | Performed by: INTERNAL MEDICINE

## 2024-07-09 PROCEDURE — 80053 COMPREHEN METABOLIC PANEL: CPT | Performed by: INTERNAL MEDICINE

## 2024-07-09 PROCEDURE — 36415 COLL VENOUS BLD VENIPUNCTURE: CPT

## 2024-07-09 PROCEDURE — 83615 LACTATE (LD) (LDH) ENZYME: CPT | Performed by: INTERNAL MEDICINE

## 2024-07-09 PROCEDURE — 82607 VITAMIN B-12: CPT | Performed by: INTERNAL MEDICINE

## 2024-07-09 PROCEDURE — 83550 IRON BINDING TEST: CPT | Performed by: INTERNAL MEDICINE

## 2024-07-09 PROCEDURE — 85025 COMPLETE CBC W/AUTO DIFF WBC: CPT | Performed by: INTERNAL MEDICINE

## 2024-07-09 PROCEDURE — 84443 ASSAY THYROID STIM HORMONE: CPT | Performed by: INTERNAL MEDICINE

## 2024-07-09 PROCEDURE — 83540 ASSAY OF IRON: CPT | Performed by: INTERNAL MEDICINE

## 2024-07-09 PROCEDURE — 83010 ASSAY OF HAPTOGLOBIN QUANT: CPT | Performed by: INTERNAL MEDICINE

## 2024-07-09 PROCEDURE — 99214 OFFICE O/P EST MOD 30 MIN: CPT | Performed by: INTERNAL MEDICINE

## 2024-07-09 PROCEDURE — 85045 AUTOMATED RETICULOCYTE COUNT: CPT | Performed by: INTERNAL MEDICINE

## 2024-07-09 RX ORDER — ANASTROZOLE 1 MG/1
1 TABLET ORAL DAILY
Qty: 90 TABLET | Refills: 3 | Status: SHIPPED | OUTPATIENT
Start: 2024-07-09

## 2024-07-09 ASSESSMENT — PAIN SCALES - GENERAL: PAINLEVEL: NO PAIN (0)

## 2024-07-09 NOTE — LETTER
"7/9/2024      Jinny Smith  [address]  [facility] [address]      Dear Colleague,    Thank you for referring your patient, Jinny Smith, to the [facility]. Please see a copy of my visit note below.    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 Pt is s/p hysterectomy and possible BSO in 1980s for ovarian cysts and reported dysplasia ?location.     - 12/31/23 CT AP w/contrast for abd pain, N/V:   incidental finding \"breast mass in the left inferior breast at approximately the 6:00 position measuring 1.5 x 1.1 cm\"  - 1/31/24 Diagnostic bilateral breast mammogram:  Left breast, lower central posterior aspect, round mass and a few smaller adjacent round nodules. In the retro-areolar region, inferiorly, at or near skin, few benign appearing calcifications  - 1/31/24 Targeted LEFT breast ultrasound:  Left breast, In the 6:00 position 3 cm from the nipple there is a 12 x 11 x 19 mm oval heterogeneous mass that correlates with the largest mass seen on previous mammogram. The surrounding tissue is heterogeneous. Only one definitive adjacent nodule is seen.  This is approximately 4 mm away from the main mass and is bilobed and measures 9 x 4 x 5 mm.  The subtle nodular opacities seen about the dominant mass cover an area of roughly 3.5 x 2.8 cm on the CC view. No other definable masses are seen.  - 2/13/24 Ultrasound-guided LEFT breast core needle biopsy of lesion at 6 oclock, 3 cm from nipple  PATHOLOGY  A. " Left breast mass, ultrasound-guided core biopsy:   -Fragmented portions of carcinoma with focal solid papillary and mucinous features.  -Associated stroma with fibrosis, hemosiderin and acute hemorrhage suggesting the wall of a cyst.    -Surgical excision is needed for definitive diagnosis and subclassification (see Comment).  The neoplastic cells are strongly and diffusely positive for estrogen receptor (91 to 100%). The progesterone receptor is also positive (moderate to strong staining, approximately 75%). Immunohistochemistry for p63 shows scattered positive cells within both the solid and cribriform areas.       Comment    The findings in this biopsy confirm malignancy but the exact subclassification and the distinction between in situ and invasive carcinoma cannot be determined due to the nature of the tumor cells, the presence of a possible cyst wall, and the disrupted nature of the biopsy fragments.  The differential diagnosis includes solid papillary carcinoma with mucinous features and a combination of both solid papillary carcinoma and mucinous carcinoma.  Complete surgical excision is needed for definitive diagnosis.  The complex left breast ultrasound findings are also noted.     - 2/26/24 consult with Dr. Esquivel- recommended MRI breast to assess extent of disease and consider BCT, pt does not wish to do MRI, plan for mastectomy w/SLNBx and no reconstruction on 3/27/24    Lifetime estrogen exposure:  Menarche: 10  Last menstrual period: in her late 20s, 1980s  Age of first pregnancy: 19 y/o  Number of pregnancies: 1  Weight gain: 20lbs in last year  Exposure to exogenous estrogen: OCPs 2 years, estrogen after hysterectomy x10-15 years     Family history of:  1.  Breast cancer including male breast cancer: mother- breast, lumpectomy in her early 70s, RT; paternal 1/2 aunt- breast cancer, details unknown   2. Ovarian cancer: negative  3.  Pancreatic cancer: negative  4.  Prostate cancer: negative  5.  Diffuse gastric cancer (if lobular breast CA): negative  6. Uterine cancer: negative  7. Colon cancer: father- dx in his late 50s,  age 66     Patient denies the following:  History of DVT/PE/VTE for self or family   Cataracts  Severe arthralgias and myalgias  Cardiovascular risk factors including diabetes  Osteoporosis  Current use of antidepressants    Pt has htn, hld. Pt is s/p hysterectomy and possible BSO in  for ovarian cysts and reported dysplasia ?location.     Regarding adrenal nodule:  - 23 CT AP: Indeterminant 1.1 cm left adrenal nodule   - 24 CT Abdomen w/wo contrast: A small round previously seen left adrenal nodule  measures 10 mm and demonstrates an average density of 24 Hounsfield units on unenhanced images, 180 Hounsfield units on early postcontrast imaging, and 67 Hounsfield units on 15 minute delayed postcontrast imaging, which results in an absolute washout of 48.8% (absolute washout less than 60% is indeterminant) and a relative washout of 38% (relative washout less than 40% is indeterminate)  IMPRESSION:   1.  Technically indeterminate 10 mm left adrenal nodule. Recommend correlation with adrenal function studies. Consider follow-up renal MRI for further characterization if clinically indicated. Otherwise, consider six-month follow-up CT to assess for stability       Pt has alcohol a few times a week, prior to that 2 drinks, 4x/week, vodka.     Pt was unable to tolerate MRI with premedications w/ativan, had possible rash related to that. She did not wish to reattempt MRI with different sedation.     - 3/27/24 left breast lumpectomy with left axillary SLNBx with Dr. Esquivel  PATHOLOGY  A.  Lymph node, left axillary sentinel #[id number], excisional biopsy:  -One lymph node, negative for metastatic carcinoma (0/1).     B.  Lymph node, left axillary sentinel #2 -, excisional biopsy:  -One lymph node, negative for metastatic carcinoma (0/1).     C.  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  -Single focus,  [30.0] mm in greatest size (3x2.7x1.7cm) (calculation as follows: Mucinous carcinoma spans six contiguous slices, # =3.0 cm ); additional dimensions: 2.7 x 1.7 cm.  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  -Carcinoma in-situ spans nine contiguous slices, # 5=13= approximately 4.5 cm.  - LVI negative, dermal LVI negative  -Previous biopsy site identified.  -Skeletal muscle is identified and uninvolved by tumor.  -One intramammary lymph node, negative for metastatic carcinoma (0/1).  -margin:    MARGINS   Margin Status for Invasive Carcinoma  Invasive carcinoma present at margin   Margin(s) Involved by Invasive Carcinoma  Anterior: 0.1 mm extent   Distance from Invasive Carcinoma to Anterior Margin  at margin mm   Distance from Invasive Carcinoma to Posterior Margin  Greater than: 10 mm   Distance from Invasive Carcinoma to Superior Margin  8.2 mm   Distance from Invasive Carcinoma to Inferior Margin  7 mm   Distance from Invasive Carcinoma to Medial Margin  Greater than: 10 mm   Distance from Invasive Carcinoma to Lateral Margin  Greater than: 10 mm   Margin Status for DCIS  DCIS present at margin   Margin(s) Involved by DCIS  Superior: 1 mm in extent     Lateral: 0.8 mm in extent   Distance from DCIS to Anterior Margin  Less than: 1 mm   Distance from DCIS to Posterior Margin  Greater than: 10 mm   Distance from DCIS to Superior Margin  at margin mm   Distance from DCIS to Inferior Margin  5 mm   Distance from DCIS to Medial Margin  Greater than: 10 mm   Distance from DCIS to Lateral Margin  at margin mm     REGIONAL LYMPH NODES   Regional Lymph Node Status  All regional lymph nodes negative for tumor   Total Number  of Lymph Nodes Examined (sentinel and non-sentinel)  3   Number of [place] Nodes Examined  2   - ER positive (%), [place] positive (%), her2 2+ on IHC, FISH negative  -Additional findings: [place] usual duct hyperplasia, duct ectasia, fibrocystic change, and fibroadenomas.  AJCC 8th edition: pT2 pN0 (sn, 0/2)    Oncotype DX breast recurrence score report:  Recurrence score result: 11  Distant recurrence risk at 9 years with adjuvant endocrine therapy alone: 3%  Group average absolute chemotherapy benefit: <1%    INTERIM HISTORY:    - 6/26/24 b/l mastectomy w/o reconstruction w/Dr. Esquivel   PATHOLOGY:  A.  Breast, right, mastectomy:  -Focal atypical ductal hyperplasia, and fibrocystic change including sclerosing adenosis.     B.  Breast, left, mastectomy:  -Focal atypical ductal hyperplasia, fibroadenomatoid changes and fibrocystic changes including sclerosing adenosis.  -Prior surgical site changes.    - 7/2/24 ARACELI drain removal  - 7/8/24 follow up with Dr. Esquivel- No evidence of residual carcinoma. Some residual dogears at the end of the incision with underlying seroma. Will observe for now. Can do scar revision down the road if becomes bothersome.     - 4/24 genetic counseling: NEGATIVE for mutations in Hereditary Cancer Breast Actionable: JOEL, BARD1, BRCA1, BRCA2, CDH1, CHEK2, NF1, PALB2, PTEN, STK11, TP53       REVIEW OF SYSTEMS:   A 14 point ROS was reviewed with pertinent positives and negatives in the HPI.        HOME MEDICATIONS:  Current Outpatient Medications   Medication Sig Dispense Refill     atorvastatin (LIPITOR) 20 MG tablet Take 1 tablet (20 mg) by mouth daily 90 tablet 3     lisinopril (ZESTRIL) 20 MG tablet Take 2 tablets (40 mg) by mouth daily 180 tablet 2         ALLERGIES:  No Known Allergies      PAST MEDICAL HISTORY:  Past Medical History:   Diagnosis Date     Family history of colon cancer      Hyperlipidemia LDL goal <130 8/28/2012     Hyperplastic colonic polyp      Hypertension  goal BP (blood pressure) < 140/80 2012     Knee joint effusion 2012         PAST SURGICAL HISTORY:  Past Surgical History:   Procedure Laterality Date     GYN SURGERY       HYSTERECTOMY TOTAL ABDOMINAL       LAPAROSCOPY DIAGNOSTIC (GENERAL) N/A 2024    Procedure: LAPAROSCOPY, DIAGNOSTIC, laparoscopic lysis of adhesions;  Surgeon: Philippe Esquivel MD;  Location: [facility] OR     LAPAROTOMY, LYSIS ADHESIONS, COMBINED N/A 2024    Procedure: LAPAROTOMY, EXPLORATORY, WITH open LYSIS OF ADHESIONS, reduction of internal hernia;  Surgeon: Philippe Esquivel MD;  Location: [facility] OR         SOCIAL HISTORY:  Social History     Socioeconomic History     Marital status:      Spouse name: Not on file     Number of children: Not on file     Years of education: Not on file     Highest education level: Not on file   Occupational History     Not on file   Tobacco Use     Smoking status: Former     Packs/day: .5     Types: Cigarettes     Quit date: 2023     Years since quittin.2     Smokeless tobacco: Former     Quit date: 2013   Vaping Use     Vaping Use: Never used   Substance and Sexual Activity     Alcohol use: Yes     Drug use: No     Sexual activity: Yes     Partners: Male   Other Topics Concern     Parent/sibling w/ CABG, MI or angioplasty before 65F 55M? Yes   Social History Narrative     Not on file     Social Determinants of Health     Financial Resource Strain: Low Risk  (2024)    Financial Resource Strain      Within the past 12 months, have you or your family members you live with been unable to get utilities (heat, electricity) when it was really needed?: No   Food Insecurity: Low Risk  (2024)    Food Insecurity      Within the past 12 months, did you worry that your food would run out before you got money to buy more?: No      Within the past 12 months, did the food you bought just not last and you didn't have money to get more?: No   Transportation Needs: Low Risk  (2024)     Transportation Needs      Within the past 12 months, has lack of transportation kept you from medical appointments, getting your medicines, non-medical meetings or appointments, work, or from getting things that you need?: No   Physical Activity: Unknown (2/27/2024)    Exercise Vital Sign      Days of Exercise per Week: 2 days      Minutes of Exercise per Session: Patient declined   Stress: No Stress Concern Present (2/27/2024)    [place] [employer] - Occupational Stress Questionnaire      Feeling of Stress : Only a little   Social Connections: Unknown (2/27/2024)    Social Connection and Isolation Panel [NHANES]      Frequency of Communication with Friends and Family: Not on file      Frequency of Social Gatherings with Friends and Family: Once a week      Attends Jew Services: Not on file      Active Member of Clubs or Organizations: Not on file      Attends Club or Organization Meetings: Not on file      Marital Status: Not on file   Interpersonal Safety: Low Risk  (2/27/2024)    Interpersonal Safety      Do you feel physically and emotionally safe where you currently live?: Yes      Within the past 12 months, have you been hit, slapped, kicked or otherwise physically hurt by someone?: No      Within the past 12 months, have you been humiliated or emotionally abused in other ways by your partner or ex-partner?: No   Housing Stability: Low Risk  (2/27/2024)    Housing Stability      Do you have housing? : Yes      Are you worried about losing your housing?: No         FAMILY HISTORY:  No family history on file.      PHYSICAL EXAM:  Vital signs:  BP (!) 148/86 (BP Location: Right arm, Patient Position: Sitting, Cuff Size: Adult Regular)   Pulse 94   Temp 97.2  F (36.2  C) (Temporal)   Wt 79.4 kg (175 lb)   SpO2 97%   BMI 31.00 kg/m         GENERAL/CONSTITUTIONAL: No acute distress.  EYES: Pupils are equal and round. Extraocular movements intact without nystagmus.  No scleral  icterus.  RESPIRATORY: Equal chest rise.   MUSCULOSKELETAL: Warm and well-perfused, no cyanosis, clubbing, or edema.   NEUROLOGIC: Cranial nerves are grossly intact. Alert, oriented to person, place and time, answers questions appropriately.  INTEGUMENTARY: No rashes or jaundice.  GAIT: Steady, does not use assistive device  BREAST: b/l mastectomy, left chest wall w/minimal erythema at incision site, right chest wall no erythema at incision site    LABS:   Latest Reference Range & Units 07/09/24 14:21   Sodium 135 - 145 mmol/L 141   Potassium 3.4 - 5.3 mmol/L 5.4 (H)   Chloride 98 - 107 mmol/L 105   Carbon Dioxide (CO2) 22 - 29 mmol/L 24   Urea Nitrogen 8.0 - 23.0 mg/dL 19.8   Creatinine 0.51 - 0.95 mg/dL 0.81   GFR Estimate >60 mL/min/1.73m2 80   Calcium 8.8 - 10.2 mg/dL 9.9   Anion Gap 7 - 15 mmol/L 12   Albumin 3.5 - 5.2 g/dL 4.5   Protein Total 6.4 - 8.3 g/dL 7.8   Alkaline Phosphatase 40 - 150 U/L 72   ALT 0 - 50 U/L 34   AST 0 - 45 U/L 22   Bilirubin Total <=1.2 mg/dL 0.3   Glucose 70 - 99 mg/dL 95   WBC 4.0 - 11.0 10e3/uL 9.6   Hemoglobin 11.7 - 15.7 g/dL 11.6 (L)   Hematocrit 35.0 - 47.0 % 34.8 (L)   Platelet Count 150 - 450 10e3/uL 261   RBC Count 3.80 - 5.20 10e6/uL 3.57 (L)   MCV 78 - 100 fL 98   MCH 26.5 - 33.0 pg 32.5   MCHC 31.5 - 36.5 g/dL 33.3   RDW 10.0 - 15.0 % 13.5   % Neutrophils % 56   % Lymphocytes % 30   % Monocytes % 7   % Eosinophils % 5   % Basophils % 1   Absolute Basophils 0.0 - 0.2 10e3/uL 0.1   Absolute Eosinophils 0.0 - 0.7 10e3/uL 0.5   Absolute Immature Granulocytes <=0.4 10e3/uL 0.1   Absolute Lymphocytes 0.8 - 5.3 10e3/uL 2.9   Absolute Monocytes 0.0 - 1.3 10e3/uL 0.7   % Immature Granulocytes % 1   Absolute Neutrophils 1.6 - 8.3 10e3/uL 5.4   Absolute NRBCs 10e3/uL 0.0   NRBCs per 100 WBC <1 /100 0   (H): Data is abnormally high  (L): Data is abnormally low     Latest Reference Range & Units 06/03/24 15:08   Sodium 135 - 145 mmol/L 136   Potassium 3.4 - 5.3 mmol/L 5.2   Chloride  "98 - 107 mmol/L 101   Carbon Dioxide (CO2) 22 - 29 mmol/L 21 (L)   Urea Nitrogen 8.0 - 23.0 mg/dL 28.2 (H)   Creatinine 0.51 - 0.95 mg/dL 0.76   GFR Estimate >60 mL/min/1.73m2 86   Calcium 8.8 - 10.2 mg/dL 9.9   Anion Gap 7 - 15 mmol/L 14   Glucose 70 - 99 mg/dL 106 (H)   WBC 4.0 - 11.0 10e3/uL 9.1   Hemoglobin 11.7 - 15.7 g/dL 14.1   Hematocrit 35.0 - 47.0 % 42.7   Platelet Count 150 - 450 10e3/uL 251   RBC Count 3.80 - 5.20 10e6/uL 4.45   MCV 78 - 100 fL 96   MCH 26.5 - 33.0 pg 31.7   MCHC 31.5 - 36.5 g/dL 33.0   RDW 10.0 - 15.0 % 12.7   (L): Data is abnormally low  (H): Data is abnormally high      PATHOLOGY:  As above    IMAGING:                                                                     IMPRESSION: NORMAL. Bone mineral density measurements are within normal limits using T score.    ASSESSMENT/PLAN:  Jinny Smith is a 64 year old female with:    # stage 2A pT2 pN0 (sn, 0/2) LEFT breast carcinoma, ER positive, ID positive, her2 negative on FISH   - 12/31/23 CT AP w/contrast for abd pain, N/V:  incidental finding \"breast mass in the left inferior breast at approximately the 6:00 position measuring 1.5 x 1.1 cm\"  - 1/31/24 Diagnostic bilateral breast mammogram, targeted left breast US:  Left breast, lower central posterior aspect, 6 oclock position, 3 cm from nipple, 1.2x1.1x1.9cm heterogenous round/oval mass and a few smaller adjacent round nodules- on US one seen 0.4cm away from main mass, 0.9x0.4x0.5cm. The subtle nodular opacities seen about the dominant mass cover an area of roughly 3.5 x 2.8 cm on the CC view. In the retro-areolar region, inferiorly, at or near skin, few benign appearing calcifications. No comment on LN  - 2/13/24 Ultrasound-guided LEFT breast core needle biopsy of lesion at 6 oclock, 3 cm from nipple, PATHOLOGY: Fragmented portions of carcinoma with focal solid papillary and mucinous features. Associated stroma with fibrosis, hemosiderin and acute hemorrhage suggesting the wall " of a cyst.  ER (%, strong), ME (75%, moderate to strong). The findings in this biopsy confirm malignancy but the exact subclassification and the distinction between in situ and invasive carcinoma cannot be determined due to the nature of the tumor cells, the presence of a possible cyst wall, and the disrupted nature of the biopsy fragments.  The differential diagnosis includes solid papillary carcinoma with mucinous features and a combination of both solid papillary carcinoma and mucinous carcinoma.   - 2/26/24 consult with Dr. Esquivel- recommended MRI breast to assess extent of disease and consider BCT, pt does not wish to do MRI, plan for mastectomy w/SLNBx and no reconstruction on 3/27/24  - 3/27/24 wire localized left breast lumpectomy with left axillary SLNBx with Dr. Esquivel, PATHOLOGY: lumpectomy: solid papillary carcinoma w/mucinous carcinoma, mucinous carcinoma: single focus 3.0x2.7x1.7cm, grade 1,  solid papillary carcinoma (95%) and DCIS- grade 2, 4.5cm; LVI negative, dermal LVI negative; left axillary SLNBx negative (0/2), one intramammary LN negative; margins positive- invasive carcinoma- anterior margin (at margin); DCIS present at margin (multifocal); ER positive (%), ME positive (%), her2 2+ on IHC, FISH negative, AJCC 8th edition: stage 2A pT2 pN0 (sn, 0/2)  - Oncotype DX breast recurrence score report:  Recurrence score result: 11  Distant recurrence risk at 9 years with adjuvant endocrine therapy alone: 3%  Group average absolute chemotherapy benefit: <1%  - 6/26/24 b/l mastectomy w/o reconstruction w/Dr. Esquivel, PATHOLOGY: No residual invasive carcinoma. right mastectomy: focal ADH and fibrocystic change including sclerosing adenosis. Left mastectomy: focal ADH, fibroadenomatoid changes and fibrocystic changes including sclerosing adenosis.     - 1/24 CBC , CMP WNL, lipid panel: total cholesterol 243, , , HDL 47  - 3/24 CBC WNL, CMP total protein 8.4,  albumin 4.7, hgbA1c 5.6    - 4/24 DEXA normal     PLAN:  - no residual invasive carcinoma on mastectomy specimens  - no indication for adjuvant chemotherapy or radiation  - will start endocrine therapy (pt has htn, hld, s/p hysterectomy + BSO in 1990s for dysplasia), discussed the following:  Tamoxifen is prescribed as tamoxifen 20mg PO daily x 5 years   Patient should not be on selective serotonin reuptake inhibitor as they can decrease the formation of active metabolites of tamoxifen and impact its efficacy. SNRIs appear to have minimal impact on tamoxifen metabolism.   Tamoxifen side effects may include, but not limited to, mood changes, hot flashes, night sweats, fatigue, nausea, cataracts, and small risk of blood clots and uterine cancer.  Patient is on tamoxifen require annual ophthalmology visit and gynecology visit    Aromatase inhibitors (AI) include:   Steroidal AI- exemestane/aromasin 25mg PO daily x5 years  Non-steroidal AI- anastrazole/arimidex 1mg PO daily x5 years  and letrozole/femara 2.5mg PO daily x5 years   Side effects may include, but not limited to: fatigue, myalgias/arthralgias, bone density loss, decrease sexual drive, vaginal dryness, nausea, headache, difficulty sleeping, hot flashes, night sweats, cardiovascular risk.  - will proceed with anastrozole   - check CBC, CMP, fasting lipid panel, hgbA1c in 1/2025- ordered today  - clinical breast exams with every visit  - repeat DEXA 4/2026  - continue calcium and vitamin D OTC    #normocytic anemia  - 1/24 CBC hgb 11.4->12.1 w/-102  - 3/24 labs  CBC hgb 13.4, MCV 96  B12 548, RBC folate 705, Copper normal, Zinc normal  Absolute retic 0.068  TSH 1.07  - 7/24 hgb 11.6, MCV 98, CMP WNL    PLAN:  - no active bleeding, 6/24/24 surgery w/EBL <100cc  - check the following labs:  Ferritin, iron studies, B12, TSH, retic, LDH, haptoglobin- added to today's labs  RBC folate, copper, zinc, Peripheral smear, UA blood and RBC- check with next  labs    #hld  - on atorvastatin     #left adrenal nodule  - 1/24 CT A w/o contrast: Technically indeterminate 10 mm left adrenal nodule. Recommend correlation with adrenal function studies. Consider follow-up renal MRI for further characterization if clinically indicated. Otherwise, consider six-month follow-up CT to assess for stability.  - pt does not want to do MRI, planning on repeat CT w/PCP 7/24    #family hx of cancer  - mother- breast cancer, paternal 1/2 aunt- breast cancer, details unknown; father colon cancer in his 50s  - 4/24 genetic counseling: NEGATIVE for mutations in Hereditary Cancer Breast Actionable: JOEL, BARD1, BRCA1, BRCA2, CDH1, CHEK2, NF1, PALB2, PTEN, STK11, TP53     RTC 6 months for follow up with TRAV, fasting labs prior to visit   RTC 12 months for follow up with physician, labs prior to visit       Xavier Alvarez DO  Hematology/Oncology  [facility]      Again, thank you for allowing me to participate in the care of your patient.        Sincerely,        XAVIER ALVAREZ DO

## 2024-07-09 NOTE — NURSING NOTE
Jinny to follow up with Primary Care provider regarding elevated blood pressure.  Sarai VARGAS [facility]  [phone]

## 2024-07-09 NOTE — PROGRESS NOTES
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 Pt is s/p hysterectomy and possible BSO in 1980s for ovarian cysts and reported dysplasia ?location.     - 12/31/23 CT AP w/contrast for abd pain, N/V:   incidental finding \"breast mass in the left inferior breast at approximately the 6:00 position measuring 1.5 x 1.1 cm\"  - 1/31/24 Diagnostic bilateral breast mammogram:  Left breast, lower central posterior aspect, round mass and a few smaller adjacent round nodules. In the retro-areolar region, inferiorly, at or near skin, few benign appearing calcifications  - 1/31/24 Targeted LEFT breast ultrasound:  Left breast, In the 6:00 position 3 cm from the nipple there is a 12 x 11 x 19 mm oval heterogeneous mass that correlates with the largest mass seen on previous mammogram. The surrounding tissue is heterogeneous. Only one definitive adjacent nodule is seen.  This is approximately 4 mm away from the main mass and is bilobed and measures 9 x 4 x 5 mm.  The subtle nodular opacities seen about the dominant mass cover an area of roughly 3.5 x 2.8 cm on the CC view. No other definable masses are seen.  - 2/13/24 Ultrasound-guided LEFT breast core needle biopsy of lesion at 6 oclock, 3 cm from nipple  PATHOLOGY  A. Left breast mass, ultrasound-guided core biopsy:   -Fragmented portions of carcinoma with focal solid papillary and mucinous features.  -Associated stroma with fibrosis, hemosiderin and acute hemorrhage suggesting the wall of a cyst.  "   -Surgical excision is needed for definitive diagnosis and subclassification (see Comment).  The neoplastic cells are strongly and diffusely positive for estrogen receptor (91 to 100%). The progesterone receptor is also positive (moderate to strong staining, approximately 75%). Immunohistochemistry for p63 shows scattered positive cells within both the solid and cribriform areas.       Comment    The findings in this biopsy confirm malignancy but the exact subclassification and the distinction between in situ and invasive carcinoma cannot be determined due to the nature of the tumor cells, the presence of a possible cyst wall, and the disrupted nature of the biopsy fragments.  The differential diagnosis includes solid papillary carcinoma with mucinous features and a combination of both solid papillary carcinoma and mucinous carcinoma.  Complete surgical excision is needed for definitive diagnosis.  The complex left breast ultrasound findings are also noted.     - 24 consult with Dr. Esquivel- recommended MRI breast to assess extent of disease and consider BCT, pt does not wish to do MRI, plan for mastectomy w/SLNBx and no reconstruction on 3/27/24    Lifetime estrogen exposure:  Menarche: 10  Last menstrual period: in her late 20s, 1980s  Age of first pregnancy: 19 y/o  Number of pregnancies: 1  Weight gain: 20lbs in last year  Exposure to exogenous estrogen: OCPs 2 years, estrogen after hysterectomy x10-15 years     Family history of:  1.  Breast cancer including male breast cancer: mother- breast, lumpectomy in her early 70s, RT; paternal 1/2 aunt- breast cancer, details unknown   2. Ovarian cancer: negative  3.  Pancreatic cancer: negative  4.  Prostate cancer: negative  5. Diffuse gastric cancer (if lobular breast CA): negative  6. Uterine cancer: negative  7. Colon cancer: father- dx in his late 50s,  age 66     Patient denies the following:  History of DVT/PE/VTE for self or family   Cataracts  Severe  arthralgias and myalgias  Cardiovascular risk factors including diabetes  Osteoporosis  Current use of antidepressants    Pt has htn, hld. Pt is s/p hysterectomy and possible BSO in 1990s for ovarian cysts and reported dysplasia ?location.     Regarding adrenal nodule:  - 12/31/23 CT AP: Indeterminant 1.1 cm left adrenal nodule   - 1/31/24 CT Abdomen w/wo contrast: A small round previously seen left adrenal nodule  measures 10 mm and demonstrates an average density of 24 Hounsfield units on unenhanced images, 180 Hounsfield units on early postcontrast imaging, and 67 Hounsfield units on 15 minute delayed postcontrast imaging, which results in an absolute washout of 48.8% (absolute washout less than 60% is indeterminant) and a relative washout of 38% (relative washout less than 40% is indeterminate)  IMPRESSION:   1.  Technically indeterminate 10 mm left adrenal nodule. Recommend correlation with adrenal function studies. Consider follow-up renal MRI for further characterization if clinically indicated. Otherwise, consider six-month follow-up CT to assess for stability       Pt has alcohol a few times a week, prior to that 2 drinks, 4x/week, vodka.     Pt was unable to tolerate MRI with premedications w/ativan, had possible rash related to that. She did not wish to reattempt MRI with different sedation.     - 3/27/24 left breast lumpectomy with left axillary SLNBx with Dr. Esquivel  PATHOLOGY  A.  Lymph node, left axillary sentinel #[id number], excisional biopsy:  -One lymph node, negative for metastatic carcinoma (0/1).     B.  Lymph node, left axillary sentinel #[id number], excisional biopsy:  -One lymph node, negative for metastatic carcinoma (0/1).     C.  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  -Single focus,  [30.0] mm in greatest size  (3x2.7x1.7cm) (calculation as follows: Mucinous carcinoma spans six contiguous slices, # =3.0 cm ); additional dimensions: 2.7 x 1.7 cm.  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  -Carcinoma in-situ spans nine contiguous slices, # 5=13= approximately 4.5 cm.  - LVI negative, dermal LVI negative  -Previous biopsy site identified.  -Skeletal muscle is identified and uninvolved by tumor.  -One intramammary lymph node, negative for metastatic carcinoma (0/1).  -margin:    MARGINS   Margin Status for Invasive Carcinoma  Invasive carcinoma present at margin   Margin(s) Involved by Invasive Carcinoma  Anterior: 0.1 mm extent   Distance from Invasive Carcinoma to Anterior Margin  at margin mm   Distance from Invasive Carcinoma to Posterior Margin  Greater than: 10 mm   Distance from Invasive Carcinoma to Superior Margin  8.2 mm   Distance from Invasive Carcinoma to Inferior Margin  7 mm   Distance from Invasive Carcinoma to Medial Margin  Greater than: 10 mm   Distance from Invasive Carcinoma to Lateral Margin  Greater than: 10 mm   Margin Status for DCIS  DCIS present at margin   Margin(s) Involved by DCIS  Superior: 1 mm in extent     Lateral: 0.8 mm in extent   Distance from DCIS to Anterior Margin  Less than: 1 mm   Distance from DCIS to Posterior Margin  Greater than: 10 mm   Distance from DCIS to Superior Margin  at margin mm   Distance from DCIS to Inferior Margin  5 mm   Distance from DCIS to Medial Margin  Greater than: 10 mm   Distance from DCIS to Lateral Margin  at margin mm     REGIONAL LYMPH NODES   Regional Lymph Node Status  All regional lymph nodes negative for tumor   Total Number of Lymph Nodes Examined (sentinel and non-sentinel)  3   Number of [place] Nodes Examined  2   - ER positive (%), [place] positive (%), her2 2+ on IHC, FISH negative  -Additional findings: [place] usual duct hyperplasia, duct  ectasia, fibrocystic change, and fibroadenomas.  AJCC 8th edition: pT2 pN0 (sn, 0/2)    Oncotype DX breast recurrence score report:  Recurrence score result: 11  Distant recurrence risk at 9 years with adjuvant endocrine therapy alone: 3%  Group average absolute chemotherapy benefit: <1%    INTERIM HISTORY:    - 6/26/24 b/l mastectomy w/o reconstruction w/Dr. Esquivel   PATHOLOGY:  A.  Breast, right, mastectomy:  -Focal atypical ductal hyperplasia, and fibrocystic change including sclerosing adenosis.     B.  Breast, left, mastectomy:  -Focal atypical ductal hyperplasia, fibroadenomatoid changes and fibrocystic changes including sclerosing adenosis.  -Prior surgical site changes.    - 7/2/24 ARACELI drain removal  - 7/8/24 follow up with Dr. Esquivel- No evidence of residual carcinoma. Some residual dogears at the end of the incision with underlying seroma. Will observe for now. Can do scar revision down the road if becomes bothersome.     - 4/24 genetic counseling: NEGATIVE for mutations in Hereditary Cancer Breast Actionable: JOEL, BARD1, BRCA1, BRCA2, CDH1, CHEK2, NF1, PALB2, PTEN, STK11, TP53       REVIEW OF SYSTEMS:   A 14 point ROS was reviewed with pertinent positives and negatives in the HPI.        HOME MEDICATIONS:  Current Outpatient Medications   Medication Sig Dispense Refill    atorvastatin (LIPITOR) 20 MG tablet Take 1 tablet (20 mg) by mouth daily 90 tablet 3    lisinopril (ZESTRIL) 20 MG tablet Take 2 tablets (40 mg) by mouth daily 180 tablet 2         ALLERGIES:  No Known Allergies      PAST MEDICAL HISTORY:  Past Medical History:   Diagnosis Date    Family history of colon cancer     Hyperlipidemia LDL goal <130 8/28/2012    Hyperplastic colonic polyp     Hypertension goal BP (blood pressure) < 140/80 8/28/2012    Knee joint effusion 8/28/2012         PAST SURGICAL HISTORY:  Past Surgical History:   Procedure Laterality Date    GYN SURGERY      HYSTERECTOMY TOTAL ABDOMINAL      LAPAROSCOPY DIAGNOSTIC  (GENERAL) N/A 2024    Procedure: LAPAROSCOPY, DIAGNOSTIC, laparoscopic lysis of adhesions;  Surgeon: Phliippe Esquivel MD;  Location: [facility] OR    LAPAROTOMY, LYSIS ADHESIONS, COMBINED N/A 2024    Procedure: LAPAROTOMY, EXPLORATORY, WITH open LYSIS OF ADHESIONS, reduction of internal hernia;  Surgeon: Philippe Esquivel MD;  Location: [facility] OR         SOCIAL HISTORY:  Social History     Socioeconomic History    Marital status:      Spouse name: Not on file    Number of children: Not on file    Years of education: Not on file    Highest education level: Not on file   Occupational History    Not on file   Tobacco Use    Smoking status: Former     Packs/day: .5     Types: Cigarettes     Quit date: 2023     Years since quittin.2    Smokeless tobacco: Former     Quit date: 2013   Vaping Use    Vaping Use: Never used   Substance and Sexual Activity    Alcohol use: Yes    Drug use: No    Sexual activity: Yes     Partners: Male   Other Topics Concern    Parent/sibling w/ CABG, MI or angioplasty before 65F 55M? Yes   Social History Narrative    Not on file     Social Determinants of Health     Financial Resource Strain: Low Risk  (2024)    Financial Resource Strain     Within the past 12 months, have you or your family members you live with been unable to get utilities (heat, electricity) when it was really needed?: No   Food Insecurity: Low Risk  (2024)    Food Insecurity     Within the past 12 months, did you worry that your food would run out before you got money to buy more?: No     Within the past 12 months, did the food you bought just not last and you didn't have money to get more?: No   Transportation Needs: Low Risk  (2024)    Transportation Needs     Within the past 12 months, has lack of transportation kept you from medical appointments, getting your medicines, non-medical meetings or appointments, work, or from getting things that you need?: No   Physical Activity: Unknown  (2/27/2024)    Exercise Vital Sign     Days of Exercise per Week: 2 days     Minutes of Exercise per Session: Patient declined   Stress: No Stress Concern Present (2/27/2024)    [employer] - Occupational Stress Questionnaire     Feeling of Stress : Only a little   Social Connections: Unknown (2/27/2024)    Social Connection and Isolation Panel [NHANES]     Frequency of Communication with Friends and Family: Not on file     Frequency of Social Gatherings with Friends and Family: Once a week     Attends Mandaeism Services: Not on file     Active Member of Clubs or Organizations: Not on file     Attends Club or Organization Meetings: Not on file     Marital Status: Not on file   Interpersonal Safety: Low Risk  (2/27/2024)    Interpersonal Safety     Do you feel physically and emotionally safe where you currently live?: Yes     Within the past 12 months, have you been hit, slapped, kicked or otherwise physically hurt by someone?: No     Within the past 12 months, have you been humiliated or emotionally abused in other ways by your partner or ex-partner?: No   Housing Stability: Low Risk  (2/27/2024)    Housing Stability     Do you have housing? : Yes     Are you worried about losing your housing?: No         FAMILY HISTORY:  No family history on file.      PHYSICAL EXAM:  Vital signs:  BP (!) 148/86 (BP Location: Right arm, Patient Position: Sitting, Cuff Size: Adult Regular)   Pulse 94   Temp 97.2  F (36.2  C) (Temporal)   Wt 79.4 kg (175 lb)   SpO2 97%   BMI 31.00 kg/m         GENERAL/CONSTITUTIONAL: No acute distress.  EYES: Pupils are equal and round. Extraocular movements intact without nystagmus.  No scleral icterus.  RESPIRATORY: Equal chest rise.   MUSCULOSKELETAL: Warm and well-perfused, no cyanosis, clubbing, or edema.   NEUROLOGIC: Cranial nerves are grossly intact. Alert, oriented to person, place and time, answers questions appropriately.  INTEGUMENTARY: No rashes or  jaundice.  GAIT: Steady, does not use assistive device  BREAST: b/l mastectomy, left chest wall w/minimal erythema at incision site, right chest wall no erythema at incision site    LABS:   Latest Reference Range & Units 07/09/24 14:21   Sodium 135 - 145 mmol/L 141   Potassium 3.4 - 5.3 mmol/L 5.4 (H)   Chloride 98 - 107 mmol/L 105   Carbon Dioxide (CO2) 22 - 29 mmol/L 24   Urea Nitrogen 8.0 - 23.0 mg/dL 19.8   Creatinine 0.51 - 0.95 mg/dL 0.81   GFR Estimate >60 mL/min/1.73m2 80   Calcium 8.8 - 10.2 mg/dL 9.9   Anion Gap 7 - 15 mmol/L 12   Albumin 3.5 - 5.2 g/dL 4.5   Protein Total 6.4 - 8.3 g/dL 7.8   Alkaline Phosphatase 40 - 150 U/L 72   ALT 0 - 50 U/L 34   AST 0 - 45 U/L 22   Bilirubin Total <=1.2 mg/dL 0.3   Glucose 70 - 99 mg/dL 95   WBC 4.0 - 11.0 10e3/uL 9.6   Hemoglobin 11.7 - 15.7 g/dL 11.6 (L)   Hematocrit 35.0 - 47.0 % 34.8 (L)   Platelet Count 150 - 450 10e3/uL 261   RBC Count 3.80 - 5.20 10e6/uL 3.57 (L)   MCV 78 - 100 fL 98   MCH 26.5 - 33.0 pg 32.5   MCHC 31.5 - 36.5 g/dL 33.3   RDW 10.0 - 15.0 % 13.5   % Neutrophils % 56   % Lymphocytes % 30   % Monocytes % 7   % Eosinophils % 5   % Basophils % 1   Absolute Basophils 0.0 - 0.2 10e3/uL 0.1   Absolute Eosinophils 0.0 - 0.7 10e3/uL 0.5   Absolute Immature Granulocytes <=0.4 10e3/uL 0.1   Absolute Lymphocytes 0.8 - 5.3 10e3/uL 2.9   Absolute Monocytes 0.0 - 1.3 10e3/uL 0.7   % Immature Granulocytes % 1   Absolute Neutrophils 1.6 - 8.3 10e3/uL 5.4   Absolute NRBCs 10e3/uL 0.0   NRBCs per 100 WBC <1 /100 0   (H): Data is abnormally high  (L): Data is abnormally low     Latest Reference Range & Units 06/03/24 15:08   Sodium 135 - 145 mmol/L 136   Potassium 3.4 - 5.3 mmol/L 5.2   Chloride 98 - 107 mmol/L 101   Carbon Dioxide (CO2) 22 - 29 mmol/L 21 (L)   Urea Nitrogen 8.0 - 23.0 mg/dL 28.2 (H)   Creatinine 0.51 - 0.95 mg/dL 0.76   GFR Estimate >60 mL/min/1.73m2 86   Calcium 8.8 - 10.2 mg/dL 9.9   Anion Gap 7 - 15 mmol/L 14   Glucose 70 - 99 mg/dL 106 (H)  "  WBC 4.0 - 11.0 10e3/uL 9.1   Hemoglobin 11.7 - 15.7 g/dL 14.1   Hematocrit 35.0 - 47.0 % 42.7   Platelet Count 150 - 450 10e3/uL 251   RBC Count 3.80 - 5.20 10e6/uL 4.45   MCV 78 - 100 fL 96   MCH 26.5 - 33.0 pg 31.7   MCHC 31.5 - 36.5 g/dL 33.0   RDW 10.0 - 15.0 % 12.7   (L): Data is abnormally low  (H): Data is abnormally high      PATHOLOGY:  As above    IMAGING:                                                                     IMPRESSION: NORMAL. Bone mineral density measurements are within normal limits using T score.    ASSESSMENT/PLAN:  Jinny Smith is a 64 year old female with:    # stage 2A pT2 pN0 (sn, 0/2) LEFT breast carcinoma, ER positive, DC positive, her2 negative on FISH   - 12/31/23 CT AP w/contrast for abd pain, N/V:  incidental finding \"breast mass in the left inferior breast at approximately the 6:00 position measuring 1.5 x 1.1 cm\"  - 1/31/24 Diagnostic bilateral breast mammogram, targeted left breast US:  Left breast, lower central posterior aspect, 6 oclock position, 3 cm from nipple, 1.2x1.1x1.9cm heterogenous round/oval mass and a few smaller adjacent round nodules- on US one seen 0.4cm away from main mass, 0.9x0.4x0.5cm. The subtle nodular opacities seen about the dominant mass cover an area of roughly 3.5 x 2.8 cm on the CC view. In the retro-areolar region, inferiorly, at or near skin, few benign appearing calcifications. No comment on LN  - 2/13/24 Ultrasound-guided LEFT breast core needle biopsy of lesion at 6 oclock, 3 cm from nipple, PATHOLOGY: Fragmented portions of carcinoma with focal solid papillary and mucinous features. Associated stroma with fibrosis, hemosiderin and acute hemorrhage suggesting the wall of a cyst.  ER (%, strong), DC (75%, moderate to strong). The findings in this biopsy confirm malignancy but the exact subclassification and the distinction between in situ and invasive carcinoma cannot be determined due to the nature of the tumor cells, the " presence of a possible cyst wall, and the disrupted nature of the biopsy fragments.  The differential diagnosis includes solid papillary carcinoma with mucinous features and a combination of both solid papillary carcinoma and mucinous carcinoma.   - 2/26/24 consult with Dr. Esquivel- recommended MRI breast to assess extent of disease and consider BCT, pt does not wish to do MRI, plan for mastectomy w/SLNBx and no reconstruction on 3/27/24  - 3/27/24 wire localized left breast lumpectomy with left axillary SLNBx with Dr. Esquivel, PATHOLOGY: lumpectomy: solid papillary carcinoma w/mucinous carcinoma, mucinous carcinoma: single focus 3.0x2.7x1.7cm, grade 1,  solid papillary carcinoma (95%) and DCIS- grade 2, 4.5cm; LVI negative, dermal LVI negative; left axillary SLNBx negative (0/2), one intramammary LN negative; margins positive- invasive carcinoma- anterior margin (at margin); DCIS present at margin (multifocal); ER positive (%), ID positive (%), her2 2+ on IHC, FISH negative, AJCC 8th edition: stage 2A pT2 pN0 (sn, 0/2)  - Oncotype DX breast recurrence score report:  Recurrence score result: 11  Distant recurrence risk at 9 years with adjuvant endocrine therapy alone: 3%  Group average absolute chemotherapy benefit: <1%  - 6/26/24 b/l mastectomy w/o reconstruction w/Dr. Esquivel, PATHOLOGY: No residual invasive carcinoma. right mastectomy: focal ADH and fibrocystic change including sclerosing adenosis. Left mastectomy: focal ADH, fibroadenomatoid changes and fibrocystic changes including sclerosing adenosis.     - 1/24 CBC , CMP WNL, lipid panel: total cholesterol 243, , , HDL 47  - 3/24 CBC WNL, CMP total protein 8.4, albumin 4.7, hgbA1c 5.6    - 4/24 DEXA normal     PLAN:  - no residual invasive carcinoma on mastectomy specimens  - no indication for adjuvant chemotherapy or radiation  - will start endocrine therapy (pt has htn, hld, s/p hysterectomy + BSO in 1990s for dysplasia),  discussed the following:  Tamoxifen is prescribed as tamoxifen 20mg PO daily x 5 years   Patient should not be on selective serotonin reuptake inhibitor as they can decrease the formation of active metabolites of tamoxifen and impact its efficacy. SNRIs appear to have minimal impact on tamoxifen metabolism.   Tamoxifen side effects may include, but not limited to, mood changes, hot flashes, night sweats, fatigue, nausea, cataracts, and small risk of blood clots and uterine cancer.  Patient is on tamoxifen require annual ophthalmology visit and gynecology visit    Aromatase inhibitors (AI) include:   Steroidal AI- exemestane/aromasin 25mg PO daily x5 years  Non-steroidal AI- anastrazole/arimidex 1mg PO daily x5 years  and letrozole/femara 2.5mg PO daily x5 years   Side effects may include, but not limited to: fatigue, myalgias/arthralgias, bone density loss, decrease sexual drive, vaginal dryness, nausea, headache, difficulty sleeping, hot flashes, night sweats, cardiovascular risk.  - will proceed with anastrozole   - check CBC, CMP, fasting lipid panel, hgbA1c in 1/2025- ordered today  - clinical breast exams with every visit  - repeat DEXA 4/2026  - continue calcium and vitamin D OTC    #normocytic anemia  - 1/24 CBC hgb 11.4->12.1 w/-102  - 3/24 labs  CBC hgb 13.4, MCV 96  B12 548, RBC folate 705, Copper normal, Zinc normal  Absolute retic 0.068  TSH 1.07  - 7/24 hgb 11.6, MCV 98, CMP WNL    PLAN:  - no active bleeding, 6/24/24 surgery w/EBL <100cc  - check the following labs:  Ferritin, iron studies, B12, TSH, retic, LDH, haptoglobin- added to today's labs  RBC folate, copper, zinc, Peripheral smear, UA blood and RBC- check with next labs    #hld  - on atorvastatin     #left adrenal nodule  - 1/24 CT A w/o contrast: Technically indeterminate 10 mm left adrenal nodule. Recommend correlation with adrenal function studies. Consider follow-up renal MRI for further characterization if clinically indicated.  Otherwise, consider six-month follow-up CT to assess for stability.  - pt does not want to do MRI, planning on repeat CT w/PCP 7/24    #family hx of cancer  - mother- breast cancer, paternal 1/2 aunt- breast cancer, details unknown; father colon cancer in his 50s  - 4/24 genetic counseling: NEGATIVE for mutations in Hereditary Cancer Breast Actionable: JOEL, BARD1, BRCA1, BRCA2, CDH1, CHEK2, NF1, PALB2, PTEN, STK11, TP53     RTC 6 months for follow up with TRAV, fasting labs prior to visit   RTC 12 months for follow up with physician, labs prior to visit       Sweta Sunday, DO  Hematology/Oncology  [facility]

## 2024-07-10 LAB
HAPTOGLOB SERPL-MCNC: 256 MG/DL (ref 30–200)
VIT B12 SERPL-MCNC: 498 PG/ML (ref 232–1245)

## 2024-10-23 ENCOUNTER — PATIENT OUTREACH (OUTPATIENT)
Dept: ONCOLOGY | Facility: CLINIC | Age: 64
End: 2024-10-23
Payer: MEDICARE

## 2024-10-29 NOTE — TELEPHONE ENCOUNTER
[facility]: Cancer Care Follow-Up Note                                    Discussion with Patient:                                                      Chart review, enrolled into [facility] per RNCC monitoring.     Dates of Treatment:                                                       Disp Refills Start End OLY   anastrozole (ARIMIDEX) 1 MG tablet 90 tablet 3 7/9/2024 -- --   Sig - Route: Take 1 tablet (1 mg) by mouth daily - Oral     Assessment:                                                      RNCC Short Symptom Review:     Assessment completed with:: VM-chart review    General/Short Assessment  Does the patient have all their medications?: Yes  Discussion with patient: Reviewed how and when to contact clinic;Reviewed patient's future appointments    Clinic Utilization  Patient Aware of Next Appointment?: Yes    Other Patient Concerns  Other Patient Reported Concerns: Yes, see notes    Intervention/Education provided during outreach:                                                       No intervention needed at this time. Patient scheduled appropriately, will continue to follow.     Patient to follow up as scheduled at next appt  Patient to call/[facility] message with updates    Signature:  Raina Jeter RN

## 2024-12-21 ENCOUNTER — APPOINTMENT (OUTPATIENT)
Dept: CT IMAGING | Facility: CLINIC | Age: 64
End: 2024-12-21
Attending: EMERGENCY MEDICINE
Payer: MEDICARE

## 2024-12-21 ENCOUNTER — HOSPITAL ENCOUNTER (EMERGENCY)
Facility: CLINIC | Age: 64
Discharge: HOME | End: 2024-12-21
Attending: EMERGENCY MEDICINE | Admitting: EMERGENCY MEDICINE
Payer: MEDICARE

## 2024-12-21 VITALS
DIASTOLIC BLOOD PRESSURE: 77 MMHG | SYSTOLIC BLOOD PRESSURE: 127 MMHG | BODY MASS INDEX: 30.04 KG/M2 | RESPIRATION RATE: 28 BRPM | HEIGHT: 64 IN | TEMPERATURE: 98.2 F | HEART RATE: 107 BPM | OXYGEN SATURATION: 98 %

## 2024-12-21 DIAGNOSIS — J44.1 COPD EXACERBATION (H): ICD-10-CM

## 2024-12-21 LAB
ANION GAP SERPL CALCULATED.3IONS-SCNC: 16 MMOL/L (ref 7–15)
BASOPHILS # BLD AUTO: 0 10E3/UL (ref 0–0.2)
BASOPHILS NFR BLD AUTO: 1 %
BUN SERPL-MCNC: 28.9 MG/DL (ref 8–23)
CALCIUM SERPL-MCNC: 10.1 MG/DL (ref 8.8–10.4)
CHLORIDE SERPL-SCNC: 102 MMOL/L (ref 98–107)
CREAT SERPL-MCNC: 0.89 MG/DL (ref 0.51–0.95)
D DIMER PPP FEU-MCNC: 1.07 UG/ML FEU (ref 0–0.5)
EGFRCR SERPLBLD CKD-EPI 2021: 71 ML/MIN/1.73M2
EOSINOPHIL # BLD AUTO: 0.1 10E3/UL (ref 0–0.7)
EOSINOPHIL NFR BLD AUTO: 2 %
ERYTHROCYTE [DISTWIDTH] IN BLOOD BY AUTOMATED COUNT: 12.8 % (ref 10–15)
GLUCOSE SERPL-MCNC: 136 MG/DL (ref 70–99)
HCO3 SERPL-SCNC: 18 MMOL/L (ref 22–29)
HCT VFR BLD AUTO: 36.2 % (ref 35–47)
HGB BLD-MCNC: 12.2 G/DL (ref 11.7–15.7)
IMM GRANULOCYTES # BLD: 0.1 10E3/UL
IMM GRANULOCYTES NFR BLD: 1 %
LYMPHOCYTES # BLD AUTO: 1.7 10E3/UL (ref 0.8–5.3)
LYMPHOCYTES NFR BLD AUTO: 23 %
MCH RBC QN AUTO: 32.3 PG (ref 26.5–33)
MCHC RBC AUTO-ENTMCNC: 33.7 G/DL (ref 31.5–36.5)
MCV RBC AUTO: 96 FL (ref 78–100)
MONOCYTES # BLD AUTO: 0.6 10E3/UL (ref 0–1.3)
MONOCYTES NFR BLD AUTO: 8 %
NEUTROPHILS # BLD AUTO: 4.9 10E3/UL (ref 1.6–8.3)
NEUTROPHILS NFR BLD AUTO: 66 %
NRBC # BLD AUTO: 0 10E3/UL
NRBC BLD AUTO-RTO: 0 /100
PLATELET # BLD AUTO: 235 10E3/UL (ref 150–450)
POTASSIUM SERPL-SCNC: 4.7 MMOL/L (ref 3.4–5.3)
RBC # BLD AUTO: 3.78 10E6/UL (ref 3.8–5.2)
SODIUM SERPL-SCNC: 136 MMOL/L (ref 135–145)
WBC # BLD AUTO: 7.4 10E3/UL (ref 4–11)

## 2024-12-21 PROCEDURE — 82374 ASSAY BLOOD CARBON DIOXIDE: CPT | Performed by: EMERGENCY MEDICINE

## 2024-12-21 PROCEDURE — 36415 COLL VENOUS BLD VENIPUNCTURE: CPT | Performed by: EMERGENCY MEDICINE

## 2024-12-21 PROCEDURE — 85379 FIBRIN DEGRADATION QUANT: CPT | Performed by: EMERGENCY MEDICINE

## 2024-12-21 PROCEDURE — 94640 AIRWAY INHALATION TREATMENT: CPT

## 2024-12-21 PROCEDURE — 80048 BASIC METABOLIC PNL TOTAL CA: CPT | Performed by: EMERGENCY MEDICINE

## 2024-12-21 PROCEDURE — 250N000009 HC RX 250: Performed by: EMERGENCY MEDICINE

## 2024-12-21 PROCEDURE — 99284 EMERGENCY DEPT VISIT MOD MDM: CPT | Performed by: EMERGENCY MEDICINE

## 2024-12-21 PROCEDURE — 250N000011 HC RX IP 250 OP 636: Performed by: EMERGENCY MEDICINE

## 2024-12-21 PROCEDURE — 99285 EMERGENCY DEPT VISIT HI MDM: CPT | Mod: 25

## 2024-12-21 PROCEDURE — G1010 CDSM STANSON: HCPCS

## 2024-12-21 PROCEDURE — 85025 COMPLETE CBC W/AUTO DIFF WBC: CPT | Performed by: EMERGENCY MEDICINE

## 2024-12-21 RX ORDER — IPRATROPIUM BROMIDE AND ALBUTEROL SULFATE 2.5; .5 MG/3ML; MG/3ML
3 SOLUTION RESPIRATORY (INHALATION) ONCE
Status: COMPLETED | OUTPATIENT
Start: 2024-12-21 | End: 2024-12-21

## 2024-12-21 RX ORDER — PREDNISONE 20 MG/1
TABLET ORAL
Qty: 10 TABLET | Refills: 0 | Status: SHIPPED | OUTPATIENT
Start: 2024-12-21

## 2024-12-21 RX ORDER — ALBUTEROL SULFATE 90 UG/1
2 INHALANT RESPIRATORY (INHALATION)
Qty: 6.7 G | Refills: 0 | Status: SHIPPED | OUTPATIENT
Start: 2024-12-21

## 2024-12-21 RX ORDER — IOPAMIDOL 755 MG/ML
500 INJECTION, SOLUTION INTRAVASCULAR ONCE
Status: COMPLETED | OUTPATIENT
Start: 2024-12-21 | End: 2024-12-21

## 2024-12-21 RX ADMIN — SODIUM CHLORIDE 70 ML: 9 INJECTION, SOLUTION INTRAVENOUS at 14:13

## 2024-12-21 RX ADMIN — IOPAMIDOL 64 ML: 755 INJECTION, SOLUTION INTRAVENOUS at 14:12

## 2024-12-21 RX ADMIN — IPRATROPIUM BROMIDE AND ALBUTEROL SULFATE 3 ML: .5; 3 SOLUTION RESPIRATORY (INHALATION) at 13:26

## 2024-12-21 ASSESSMENT — ACTIVITIES OF DAILY LIVING (ADL)
ADLS_ACUITY_SCORE: 50

## 2024-12-21 ASSESSMENT — COLUMBIA-SUICIDE SEVERITY RATING SCALE - C-SSRS
6. HAVE YOU EVER DONE ANYTHING, STARTED TO DO ANYTHING, OR PREPARED TO DO ANYTHING TO END YOUR LIFE?: NO
1. IN THE PAST MONTH, HAVE YOU WISHED YOU WERE DEAD OR WISHED YOU COULD GO TO SLEEP AND NOT WAKE UP?: NO
2. HAVE YOU ACTUALLY HAD ANY THOUGHTS OF KILLING YOURSELF IN THE PAST MONTH?: NO

## 2024-12-21 ASSESSMENT — ENCOUNTER SYMPTOMS
SORE THROAT: 0
NECK PAIN: 0
ABDOMINAL PAIN: 0
DIZZINESS: 0
NAUSEA: 0
FREQUENCY: 0
WEAKNESS: 0
NUMBNESS: 0
HEADACHES: 1
FEVER: 0
BACK PAIN: 0
VOMITING: 0
WHEEZING: 0
TROUBLE SWALLOWING: 0
DIARRHEA: 0
RHINORRHEA: 0
COLOR CHANGE: 0
SHORTNESS OF BREATH: 1
DYSURIA: 0
UNEXPECTED WEIGHT CHANGE: 0
COUGH: 0
CHILLS: 0
CONSTIPATION: 0
LIGHT-HEADEDNESS: 1

## 2024-12-21 NOTE — PROGRESS NOTES
12/21/24 1326   Vital Signs   Oximeter Heart Rate 88 bpm   Oxygen Therapy   SpO2 97 %   O2 Device None (Room air)   Nebulizer Assessment & Treatment   $RT Use ONLY Delivery Method Nebulizer - Initial   Nebulizer Device Mouthpiece   Pretreatment Heart Rate (beats/min) 93   Pretreatment Resp Rate (breaths/min) 16   Pretreat Breath Sounds - Bilat - All Lobes clear   Pretreat Breath Sounds - ARRON clear   Pretreat Breath Sounds - LLL clear   Pretreat Breath Sounds - RUL clear   Pretreat Breath Sounds - RML clear   Pretreat Breath Sounds - RLL clear   Patient Position sitting on edge of bed   Respiratory Treatment Status (SVN) given   Breath Sounds Post-Respiratory Treatment   Posttreatment Heart Rate (beats/min) 89   Posttreatment Resp Rate (breaths/min) 16   Posttreatment Assessment (SVN) breath sounds unchanged   Signs of Intolerance (SVN) none   Breath Sounds Posttreatment All Fields clear   Breath Sounds Posttreatment ARRON clear   Breath Sounds Posttreatment LLL clear   Breath Sounds Posttreatment RUL clear   Breath Sounds Posttreatment RML clear   Breath Sounds Posttreatment RLL clear     Chest tightness breath sounds and dyspnea unchanged post neb.

## 2024-12-21 NOTE — ED PROVIDER NOTES
History     Chief Complaint   Patient presents with    Shortness of Breath     The history is provided by the patient.     Jinny Smith is a 64 year old female with a recent history of breast cancer status post mastectomy on anastrozole who presents to the ED with sudden onset shortness of breath that began 3 days ago.  She states this worsened overnight, prompting her to come in.  No pain associated with this.  No cough, no wheezing.  She has had headache and mild lightheadedness.  She states that she simply cannot take a deep breath.  She denies fever, sore throat, or other infectious symptoms leg swelling, constipation, diarrhea, nausea/vomiting, urgency, or frequency.  No numbness, tingling.  She denies a history of anxiety; no recent increase in stressors.    Allergies:  Allergies   Allergen Reactions    Lorazepam Itching and Rash     Rash on chest and upper neck after 20 minutes. Did also get itchy.       Problem List:    Patient Active Problem List    Diagnosis Date Noted    Post-op pain 06/27/2024     Priority: Medium    Adrenal nodule (H) 01/02/2024     Priority: Medium    Mass of left breast, unspecified quadrant 01/02/2024     Priority: Medium    SBO (small bowel obstruction) (H) 12/31/2023     Priority: Medium    Tobacco use disorder 12/31/2023     Priority: Medium    Leukocytosis 12/31/2023     Priority: Medium    Hyponatremia 12/31/2023     Priority: Medium    Generalized anxiety disorder 09/10/2013     Priority: Medium     Diagnosis updated by automated process. Provider to review and confirm.      H/O: hysterectomy 07/02/2013     Priority: Medium    Hypertension goal BP (blood pressure) < 140/80 08/28/2012     Priority: Medium    Hyperlipidemia LDL goal <130 08/28/2012     Priority: Medium    Knee joint effusion 08/28/2012     Priority: Medium        Past Medical History:    Past Medical History:   Diagnosis Date    Family history of colon cancer     Hyperlipidemia LDL goal <130 8/28/2012     Hyperplastic colonic polyp     Hypertension goal BP (blood pressure) < 140/80 2012    Knee joint effusion 2012       Past Surgical History:    Past Surgical History:   Procedure Laterality Date    BIOPSY BREAST NEEDLE LOCALIZATION, BIOPSY NODE SENTINEL, COMBINED Left 3/27/2024    Procedure: BIOPSY, LEFT BREAST, WITH NEEDLE LOCALIZATION AND SENTINEL LYMPH NODE BIOPSY;  Surgeon: Philippe Esquivel MD;  Location: [facility]    GYN SURGERY      HYSTERECTOMY TOTAL ABDOMINAL      LAPAROSCOPY DIAGNOSTIC (GENERAL) N/A 2024    Procedure: LAPAROSCOPY, DIAGNOSTIC, laparoscopic lysis of adhesions;  Surgeon: Philippe Esquivel MD;  Location: [facility]    LAPAROTOMY, LYSIS ADHESIONS, COMBINED N/A 2024    Procedure: LAPAROTOMY, EXPLORATORY, WITH open LYSIS OF ADHESIONS, reduction of internal hernia;  Surgeon: Philippe Esquievl MD;  Location: [facility]    MASTECTOMY SIMPLE BILATERAL Bilateral 2024    Procedure: Bilateral mastectomy;  Surgeon: Philippe Esquivel MD;  Location: [facility]       Family History:    History reviewed. No pertinent family history.    Social History:  Marital Status:   [2]  Social History     Tobacco Use    Smoking status: Former     Current packs/day: 0.00     Types: Cigarettes     Quit date: 2023     Years since quittin.9    Smokeless tobacco: Former     Quit date: 2013   Vaping Use    Vaping status: Never Used   Substance Use Topics    Alcohol use: Yes    Drug use: No        Medications:    albuterol (PROAIR HFA/PROVENTIL HFA/VENTOLIN HFA) 108 (90 Base) MCG/ACT inhaler  predniSONE (DELTASONE) 20 MG tablet  anastrozole (ARIMIDEX) 1 MG tablet  atorvastatin (LIPITOR) 20 MG tablet  calcium citrate-vitamin D (CITRACAL) 315-6.25 MG-MCG TABS per tablet  lisinopril (ZESTRIL) 20 MG tablet          Review of Systems   Constitutional:  Negative for chills, fever and unexpected weight change.   HENT:  Negative for congestion, rhinorrhea, sore throat and trouble swallowing.    Eyes:  Negative  "for visual disturbance.   Respiratory:  Positive for shortness of breath. Negative for cough and wheezing.    Cardiovascular:  Negative for chest pain and leg swelling.   Gastrointestinal:  Negative for abdominal pain, constipation, diarrhea, nausea and vomiting.   Genitourinary:  Negative for dysuria, frequency and urgency.   Musculoskeletal:  Negative for back pain and neck pain.   Skin:  Negative for color change and rash.   Neurological:  Positive for light-headedness and headaches. Negative for dizziness, weakness and numbness.       Physical Exam   BP: (!) 159/90  Pulse: (!) 123  Temp: 98.2  F (36.8  C)  Resp: 28  Height: 162.6 cm (5' 4\")  Weight:  (pateint declined)  SpO2: 99 %      Physical Exam  Vitals and nursing note reviewed.   Constitutional:       Appearance: She is obese. She is not ill-appearing or toxic-appearing.   HENT:      Head: Normocephalic and atraumatic.      Mouth/Throat:      Mouth: Mucous membranes are moist.      Pharynx: Oropharynx is clear.   Cardiovascular:      Rate and Rhythm: Regular rhythm. Tachycardia present.      Pulses:           Radial pulses are 2+ on the right side and 2+ on the left side.        Posterior tibial pulses are 2+ on the right side and 2+ on the left side.      Heart sounds: Normal heart sounds.   Pulmonary:      Effort: Tachypnea present. No accessory muscle usage or respiratory distress.      Breath sounds: Examination of the right-lower field reveals decreased breath sounds. Examination of the left-lower field reveals decreased breath sounds. Decreased breath sounds present. No wheezing, rhonchi or rales.   Chest:      Chest wall: No tenderness.   Abdominal:      Palpations: Abdomen is soft.   Musculoskeletal:         General: Normal range of motion.      Cervical back: Normal range of motion.      Right lower leg: No tenderness. No edema.      Left lower leg: No tenderness. No edema.   Skin:     General: Skin is warm and dry.      Capillary Refill: " Capillary refill takes less than 2 seconds.   Neurological:      General: No focal deficit present.      Mental Status: She is alert.   Psychiatric:         Mood and Affect: Mood is anxious.         Behavior: Behavior normal.         ED Course        Procedures                  Results for orders placed or performed during the hospital encounter of 12/21/24 (from the past 24 hours)   CBC with platelets differential    Narrative    The following orders were created for panel order CBC with platelets differential.  Procedure                               Abnormality         Status                     ---------                               -----------         ------                     CBC with platelets and d...[[id number]]  Abnormal            Final result                 Please view results for these tests on the individual orders.   D dimer quantitative   Result Value Ref Range    D-Dimer Quantitative 1.07 (H) 0.00 - 0.50 ug/mL FEU    Narrative    This D-dimer assay is intended for use in conjunction with a clinical pretest probability assessment model to exclude pulmonary embolism (PE) and deep venous thrombosis (DVT) in outpatients suspected of PE or DVT. The cut-off value is 0.50 ug/mL FEU.    For patients 50 years of age or older, the application of age-adjusted cut-off values for D-Dimer may increase the specificity without significant effect on sensitivity. The literature suggested calculation age adjusted cut-off in ug/L = age in years x 10 ug/L. The results in this laboratory are reported as ug/mL rather than ug/L. The calculation for age adjusted cut off in ug/mL= age in years x 0.01 ug/mL. For example, the cut off for a 76 year old male is 74 x 0.01 ug/mL = 0.76 ug/mL (760 ug/L).    M Jordi et al. Age adjusted D-dimer cut-off levels to rule out pulmonary embolism: The ADJUST-PE Study. YONAS 2014;311:6667-7047.; HJ Carmen et al. Diagnostic accuracy of conventional or age adjusted D-dimer cutoff values  in older patients with suspected venous thromboembolism. Systemic review and meta-analysis. BMJ 2013:346:f2492.   Basic metabolic panel   Result Value Ref Range    Sodium 136 135 - 145 mmol/L    Potassium 4.7 3.4 - 5.3 mmol/L    Chloride 102 98 - 107 mmol/L    Carbon Dioxide (CO2) 18 (L) 22 - 29 mmol/L    Anion Gap 16 (H) 7 - 15 mmol/L    Urea Nitrogen 28.9 (H) 8.0 - 23.0 mg/dL    Creatinine 0.89 0.51 - 0.95 mg/dL    GFR Estimate 71 >60 mL/min/1.73m2    Calcium 10.1 8.8 - 10.4 mg/dL    Glucose 136 (H) 70 - 99 mg/dL   CBC with platelets and differential   Result Value Ref Range    WBC Count 7.4 4.0 - 11.0 10e3/uL    RBC Count 3.78 (L) 3.80 - 5.20 10e6/uL    Hemoglobin 12.2 11.7 - 15.7 g/dL    Hematocrit 36.2 35.0 - 47.0 %    MCV 96 78 - 100 fL    MCH 32.3 26.5 - 33.0 pg    MCHC 33.7 31.5 - 36.5 g/dL    RDW 12.8 10.0 - 15.0 %    Platelet Count 235 150 - 450 10e3/uL    % Neutrophils 66 %    % Lymphocytes 23 %    % Monocytes 8 %    % Eosinophils 2 %    % Basophils 1 %    % Immature Granulocytes 1 %    NRBCs per 100 WBC 0 <1 /100    Absolute Neutrophils 4.9 1.6 - 8.3 10e3/uL    Absolute Lymphocytes 1.7 0.8 - 5.3 10e3/uL    Absolute Monocytes 0.6 0.0 - 1.3 10e3/uL    Absolute Eosinophils 0.1 0.0 - 0.7 10e3/uL    Absolute Basophils 0.0 0.0 - 0.2 10e3/uL    Absolute Immature Granulocytes 0.1 <=0.4 10e3/uL    Absolute NRBCs 0.0 10e3/uL   CT Chest Pulmonary Embolism w Contrast    Narrative    EXAM: CT CHEST PULMONARY EMBOLISM W CONTRAST  LOCATION: [facility]  DATE: 12/21/2024    INDICATION: Shortness of breath  COMPARISON: None.  TECHNIQUE: CT chest pulmonary angiogram during arterial phase injection of IV contrast. Multiplanar reformats and MIP reconstructions were performed. Dose reduction techniques were used.   CONTRAST: Isovue 370, 64mL    FINDINGS:  ANGIOGRAM CHEST: Pulmonary arteries are normal caliber and negative for pulmonary emboli. Thoracic aorta is negative for dissection. No CT  evidence of right heart strain.    LUNGS AND PLEURA: Normal.    MEDIASTINUM/AXILLAE: Moderate atherosclerotic disease of the aortic arch. Aortic valve calcifications. No thoracic aortic aneurysm. No lymphadenopathy.    CORONARY ARTERY CALCIFICATION: Mild.    UPPER ABDOMEN: Stable small left adrenal nodule.    MUSCULOSKELETAL: No concerning bone lesions.      Impression    IMPRESSION:  1.  No pulmonary embolism.       Medications   ipratropium - albuterol 0.5 mg/2.5 mg/3 mL (DUONEB) neb solution 3 mL (3 mLs Nebulization $Given 12/21/24 1326)   iopamidol (ISOVUE-370) solution 500 mL (64 mLs Intravenous $Given 12/21/24 1412)   sodium chloride 0.9 % bag 100ml for CT scan flush use (70 mLs As instructed $Given 12/21/24 1413)       Assessments & Plan (with Medical Decision Making)  Jinny Smith is a 64 year old female who presents today for sudden onset shortness of breath 3 days ago.  No pleuritic pain.  She is currently on anastrozole status post mastectomy for breast cancer.  No cough, no wheezing.  No infectious symptoms.  On exam the patient is hypertensive and tachycardic.  Afebrile.  Oxygenating well on room air.  She appears in mild distress.  She states she has difficulty taking a deep breath.  Mildly diminished breath sounds to bilateral lower lobes.  No lower extremity edema.  Labs were obtained including BMP, which shows mildly decreased CO2 at 18.  CBC without leukocytosis.  D-dimer was elevated at 1.07, age-adjusted to 0.66.  CT chest PE was ordered, which shows no evidence of PE, pneumonia, or other acute lung pathology. This was discussed with the patient.  Given her past history of smoking, I am suspicious of a COPD exacerbation. She is reassured that there is no evidence of PE. I recommended she start a short course of prednisone and use an albuterol inhaler as needed for her shortness of breath. These were sent to the pharmacy here for her to . She can return here for any new or worsening  symptoms. She was discharged in agreement and understanding of the plan.      I have reviewed the nursing notes.    I have reviewed the findings, diagnosis, plan and need for follow up with the patient.        New Prescriptions    ALBUTEROL (PROAIR HFA/PROVENTIL HFA/VENTOLIN HFA) 108 (90 BASE) MCG/ACT INHALER    Inhale 2 puffs into the lungs every 3 hours as needed for shortness of breath.    PREDNISONE (DELTASONE) 20 MG TABLET    Take two tablets (= 40mg) each day for 5 (five) days       Final diagnoses:   COPD exacerbation (H) - potentially     Patient was seen and examined by myself and Dr. Prieto. The note was then documented by me.    Vinicio Grewal, MS3  [employer]  12/21/24    I was present with the medical student who participated in the service and in the documentation of the note. I have verified the history and personally performed the physical exam and medical decision making. I reviewed the note in detail and edited it appropriately. I agree with the assessment and plan of care as documented in the note.    12/21/2024   [facility] EMERGENCY DEPT       Satya Prieto MD  12/21/24 2163

## 2024-12-21 NOTE — ED TRIAGE NOTES
Pt having increased SOB over last two days but now having difficulty taking breaths in, SpO2 99% on RA but tachy and shallow breathing, no respiratory hx, pt declined wheelchair multiple times.      Triage Assessment (Adult)       Row Name 12/21/24 1242          Triage Assessment    Airway WDL WDL        Respiratory WDL    Respiratory WDL X;rhythm/pattern     Rhythm/Pattern, Respiratory shortness of breath;tachypneic;shallow        Skin Circulation/Temperature WDL    Skin Circulation/Temperature WDL WDL        Cardiac WDL    Cardiac WDL WDL        Peripheral/Neurovascular WDL    Peripheral Neurovascular WDL WDL        Cognitive/Neuro/Behavioral WDL    Cognitive/Neuro/Behavioral WDL WDL

## 2025-01-09 ENCOUNTER — LAB (OUTPATIENT)
Dept: LAB | Facility: CLINIC | Age: 65
End: 2025-01-09
Payer: MEDICARE

## 2025-01-09 DIAGNOSIS — C50.112 MALIGNANT NEOPLASM OF CENTRAL PORTION OF LEFT FEMALE BREAST, UNSPECIFIED ESTROGEN RECEPTOR STATUS (H): ICD-10-CM

## 2025-01-09 DIAGNOSIS — D64.9 NORMOCYTIC ANEMIA: ICD-10-CM

## 2025-01-09 DIAGNOSIS — R63.39 OTHER FEEDING DIFFICULTIES: ICD-10-CM

## 2025-01-09 DIAGNOSIS — Z79.811 USE OF AROMATASE INHIBITORS: ICD-10-CM

## 2025-01-09 LAB
ALBUMIN SERPL BCG-MCNC: 4.6 G/DL (ref 3.5–5.2)
ALBUMIN UR-MCNC: NEGATIVE MG/DL
ALP SERPL-CCNC: 68 U/L (ref 40–150)
ALT SERPL W P-5'-P-CCNC: 29 U/L (ref 0–50)
APPEARANCE UR: CLEAR
AST SERPL W P-5'-P-CCNC: 21 U/L (ref 0–45)
BASOPHILS # BLD AUTO: 0.1 10E3/UL (ref 0–0.2)
BASOPHILS NFR BLD AUTO: 1 %
BILIRUB DIRECT SERPL-MCNC: <0.2 MG/DL (ref 0–0.3)
BILIRUB SERPL-MCNC: 0.4 MG/DL
BILIRUB UR QL STRIP: NEGATIVE
CHOLEST SERPL-MCNC: 161 MG/DL
COLOR UR AUTO: COLORLESS
EOSINOPHIL # BLD AUTO: 0.2 10E3/UL (ref 0–0.7)
EOSINOPHIL NFR BLD AUTO: 2 %
ERYTHROCYTE [DISTWIDTH] IN BLOOD BY AUTOMATED COUNT: 12.2 % (ref 10–15)
FASTING STATUS PATIENT QL REPORTED: YES
GLUCOSE UR STRIP-MCNC: NEGATIVE MG/DL
HCT VFR BLD AUTO: 38.3 % (ref 35–47)
HCT VFR BLD AUTO: 38.3 % (ref 35–47)
HDLC SERPL-MCNC: 48 MG/DL
HGB BLD-MCNC: 12.5 G/DL (ref 11.7–15.7)
HGB UR QL STRIP: NEGATIVE
IMM GRANULOCYTES # BLD: 0.1 10E3/UL
IMM GRANULOCYTES NFR BLD: 1 %
KETONES UR STRIP-MCNC: NEGATIVE MG/DL
LDLC SERPL CALC-MCNC: 79 MG/DL
LEUKOCYTE ESTERASE UR QL STRIP: ABNORMAL
LYMPHOCYTES # BLD AUTO: 3 10E3/UL (ref 0.8–5.3)
LYMPHOCYTES NFR BLD AUTO: 30 %
MCH RBC QN AUTO: 32 PG (ref 26.5–33)
MCHC RBC AUTO-ENTMCNC: 32.6 G/DL (ref 31.5–36.5)
MCV RBC AUTO: 98 FL (ref 78–100)
MONOCYTES # BLD AUTO: 0.7 10E3/UL (ref 0–1.3)
MONOCYTES NFR BLD AUTO: 7 %
NEUTROPHILS # BLD AUTO: 6 10E3/UL (ref 1.6–8.3)
NEUTROPHILS NFR BLD AUTO: 59 %
NITRATE UR QL: NEGATIVE
NONHDLC SERPL-MCNC: 113 MG/DL
NRBC # BLD AUTO: 0 10E3/UL
NRBC BLD AUTO-RTO: 0 /100
PH UR STRIP: 5.5 [PH] (ref 5–7)
PLATELET # BLD AUTO: 244 10E3/UL (ref 150–450)
PROT SERPL-MCNC: 8 G/DL (ref 6.4–8.3)
RBC # BLD AUTO: 3.91 10E6/UL (ref 3.8–5.2)
RBC #/AREA URNS AUTO: ABNORMAL /HPF
RETICS # AUTO: 0.07 10E6/UL
RETICS/RBC NFR AUTO: 1.9 %
SP GR UR STRIP: 1.01 (ref 1–1.03)
SQUAMOUS #/AREA URNS AUTO: ABNORMAL /LPF
TRIGL SERPL-MCNC: 171 MG/DL
UROBILINOGEN UR STRIP-MCNC: NORMAL MG/DL
WBC # BLD AUTO: 10.1 10E3/UL (ref 4–11)
WBC #/AREA URNS AUTO: ABNORMAL /HPF

## 2025-01-12 LAB — FOLATE RBC-MCNC: 1121 NG/ML

## 2025-01-13 LAB
COPPER SERPL-MCNC: 130.6 UG/DL
ZINC SERPL-MCNC: 79.9 UG/DL

## 2025-02-13 DIAGNOSIS — E78.5 HYPERLIPIDEMIA LDL GOAL <130: ICD-10-CM

## 2025-02-13 RX ORDER — ATORVASTATIN CALCIUM 20 MG/1
20 TABLET, FILM COATED ORAL DAILY
Qty: 90 TABLET | Refills: 0 | Status: SHIPPED | OUTPATIENT
Start: 2025-02-13

## 2025-02-26 ENCOUNTER — MYC MEDICAL ADVICE (OUTPATIENT)
Dept: INTERNAL MEDICINE | Facility: CLINIC | Age: 65
End: 2025-02-26
Payer: MEDICARE

## 2025-02-26 NOTE — TELEPHONE ENCOUNTER
"Patient sent in [facility] \"was in the ER in December for shortness of breathe. I was told it's COPD and prescribed Albuterol. The med is helpful and I need a refill.\"    Patient not seen by PCP since 6/3/24. Called and spoke with patient, did advise PCP would likely want to see patient as follow up. Patient agrees. Denies need for triage at time of call. Denies SOB. Patient states she is not out of medications and has enough until apt. Scheduled apt for 3/4/25. Advised of arrival time. Patient verbalizes understanding and agrees with plan of care.    Mary Curran RN on 2/26/2025 at 2:10 PM    "

## 2025-03-04 ENCOUNTER — OFFICE VISIT (OUTPATIENT)
Dept: INTERNAL MEDICINE | Facility: CLINIC | Age: 65
End: 2025-03-04
Payer: MEDICARE

## 2025-03-04 VITALS
SYSTOLIC BLOOD PRESSURE: 160 MMHG | TEMPERATURE: 98.7 F | DIASTOLIC BLOOD PRESSURE: 86 MMHG | BODY MASS INDEX: 29.88 KG/M2 | HEART RATE: 110 BPM | RESPIRATION RATE: 18 BRPM | HEIGHT: 64 IN | OXYGEN SATURATION: 96 %

## 2025-03-04 DIAGNOSIS — Z79.811 USE OF AROMATASE INHIBITORS: ICD-10-CM

## 2025-03-04 DIAGNOSIS — I10 HYPERTENSION GOAL BP (BLOOD PRESSURE) < 140/80: ICD-10-CM

## 2025-03-04 DIAGNOSIS — Z23 ENCOUNTER FOR IMMUNIZATION: ICD-10-CM

## 2025-03-04 DIAGNOSIS — C50.112 MALIGNANT NEOPLASM OF CENTRAL PORTION OF LEFT FEMALE BREAST, UNSPECIFIED ESTROGEN RECEPTOR STATUS (H): ICD-10-CM

## 2025-03-04 DIAGNOSIS — Z00.00 MEDICARE ANNUAL WELLNESS VISIT, SUBSEQUENT: Primary | ICD-10-CM

## 2025-03-04 DIAGNOSIS — Z23 NEED FOR PROPHYLACTIC VACCINATION AND INOCULATION AGAINST INFLUENZA: ICD-10-CM

## 2025-03-04 DIAGNOSIS — E78.5 HYPERLIPIDEMIA LDL GOAL <130: ICD-10-CM

## 2025-03-04 DIAGNOSIS — J44.1 COPD EXACERBATION (H): ICD-10-CM

## 2025-03-04 DIAGNOSIS — J44.9 CHRONIC OBSTRUCTIVE PULMONARY DISEASE, UNSPECIFIED COPD TYPE (H): ICD-10-CM

## 2025-03-04 DIAGNOSIS — Z12.11 SCREEN FOR COLON CANCER: ICD-10-CM

## 2025-03-04 RX ORDER — METOPROLOL SUCCINATE 50 MG/1
50 TABLET, EXTENDED RELEASE ORAL DAILY
Qty: 90 TABLET | Refills: 3 | Status: SHIPPED | OUTPATIENT
Start: 2025-03-04

## 2025-03-04 RX ORDER — FLUTICASONE PROPIONATE AND SALMETEROL 250; 50 UG/1; UG/1
1 POWDER RESPIRATORY (INHALATION) EVERY 12 HOURS
Qty: 60 EACH | Refills: 11 | Status: SHIPPED | OUTPATIENT
Start: 2025-03-04

## 2025-03-04 RX ORDER — ATORVASTATIN CALCIUM 20 MG/1
20 TABLET, FILM COATED ORAL DAILY
Qty: 90 TABLET | Refills: 3 | Status: SHIPPED | OUTPATIENT
Start: 2025-03-04

## 2025-03-04 RX ORDER — ALBUTEROL SULFATE 90 UG/1
2 INHALANT RESPIRATORY (INHALATION)
Qty: 6.7 G | Refills: 5 | Status: SHIPPED | OUTPATIENT
Start: 2025-03-04

## 2025-03-04 SDOH — HEALTH STABILITY: PHYSICAL HEALTH: ON AVERAGE, HOW MANY DAYS PER WEEK DO YOU ENGAGE IN MODERATE TO STRENUOUS EXERCISE (LIKE A BRISK WALK)?: 3 DAYS

## 2025-03-04 ASSESSMENT — SOCIAL DETERMINANTS OF HEALTH (SDOH)
HOW OFTEN DO YOU GET TOGETHER WITH FRIENDS OR RELATIVES?: TWICE A WEEK
HOW OFTEN DO YOU GET TOGETHER WITH FRIENDS OR RELATIVES?: TWICE A WEEK

## 2025-03-04 ASSESSMENT — PAIN SCALES - GENERAL: PAINLEVEL_OUTOF10: NO PAIN (0)

## 2025-03-04 NOTE — PROGRESS NOTES
Preventive Care Visit  [facility]  Celso Anaya MD, Internal Medicine  Mar 4, 2025      Assessment & Plan   Problem List Items Addressed This Visit       Hypertension goal BP (blood pressure) < 140/80    Relevant Medications    metoprolol succinate ER (TOPROL XL) 50 MG 24 hr tablet    Hyperlipidemia LDL goal <130    Relevant Medications    atorvastatin (LIPITOR) 20 MG tablet     Other Visit Diagnoses       Medicare annual wellness visit, subsequent    -  Primary    Screen for colon cancer        Relevant Orders    Colonoscopy Screening  Referral    COPD exacerbation (H)        Relevant Medications    fluticasone-salmeterol (ADVAIR) 250-50 MCG/ACT inhaler    albuterol (PROAIR HFA/PROVENTIL HFA/VENTOLIN HFA) 108 (90 Base) MCG/ACT inhaler    Chronic obstructive pulmonary disease, unspecified COPD type (H)        Relevant Medications    fluticasone-salmeterol (ADVAIR) 250-50 MCG/ACT inhaler    albuterol (PROAIR HFA/PROVENTIL HFA/VENTOLIN HFA) 108 (90 Base) MCG/ACT inhaler    Other Relevant Orders    General PFT Lab (Please always keep checked)    Pulmonary Function Test    Malignant neoplasm of central portion of left female breast, unspecified estrogen receptor status (H)        Relevant Medications    albuterol (PROAIR HFA/PROVENTIL HFA/VENTOLIN HFA) 108 (90 Base) MCG/ACT inhaler    Use of aromatase inhibitors        Relevant Medications    albuterol (PROAIR HFA/PROVENTIL HFA/VENTOLIN HFA) 108 (90 Base) MCG/ACT inhaler    Need for prophylactic vaccination and inoculation against influenza               Patient is here for Medicare wellness exam.  She is doing okay.  Colonoscopy is ordered  No more mammograms for history of mastectomy for breast cancer.  Immunizations she will get a flu shot today.    Other issues addressed COPD and not recent episode of shortness of breath in the ER she got some tachycardia and high blood pressure along with her shortness of breath.  We will  "treat her tachycardia and hypertension with metoprolol on top of her lisinopril.  Will treat her COPD by adding Advair and getting PFTs we will renew her albuterol which helped her.  She will get the flu shot today she is already had a pneumonia shot.    Hyperlipidemia continue atorvastatin she had her lipids checked which were good.    Blood pressure is on the lisinopril and will add metoprolol.    Breast cancer is followed by oncology she is on Arimidex doing well.    The longitudinal plan of care for the diagnosis(es)/condition(s) as documented were addressed during this visit. Due to the added complexity in care, I will continue to support Jinny in the subsequent management and with ongoing continuity of care.                Patient has been advised of split billing requirements and indicates understanding: Yes       BMI  Estimated body mass index is 29.88 kg/m  as calculated from the following:    Height as of this encounter: 1.63 m (5' 4.17\").    Weight as of 7/9/24: 79.4 kg (175 lb).       Counseling  Appropriate preventive services were addressed with this patient via screening, questionnaire, or discussion as appropriate for fall prevention, nutrition, physical activity, Tobacco-use cessation, social engagement, weight loss and cognition.  Checklist reviewing preventive services available has been given to the patient.  Reviewed patient's diet, addressing concerns and/or questions.   She is at risk for lack of exercise and has been provided with information to increase physical activity for the benefit of her well-being.   The patient was instructed to see the dentist every 6 months.     FUTURE APPOINTMENTS:       - Follow-up for annual visit or as needed      Subjective   Jinny is a 65 year old, presenting for the following:  Physical, Recheck Medication, and COPD        3/4/2025    12:48 PM   Additional Questions   Roomed by Sabrina         3/4/2025   Declines Weight   Did patient decline having their " weight taken? Yes     Healthy Habits:     Taking medications regularly:  0  History of Present Illness      She is taking medications regularly.    Doing ok,   Breast cancer treated in July, continues with Arimidex.      Dec sob and hard to take deep breath, used to smoke quit 15 months ago. Ct scan of lungs no clot, lungs were ok, possible COPD and give prednisone and inhaler, helped her.  Uses inhaler still daily, albuterol.      Adrenal left side stable on 3 CT scans.     Advance Care Planning  Patient does not have a Health Care Directive: Discussed advance care planning with patient; however, patient declined at this time.      3/4/2025   General Health   How would you rate your overall physical health? Good   Feel stress (tense, anxious, or unable to sleep) Only a little   (!) STRESS CONCERN      3/4/2025   Nutrition   Diet: Regular (no restrictions)         3/4/2025   Exercise   Days per week of moderate/strenous exercise 3 days         3/4/2025   Social Factors   Frequency of gathering with friends or relatives Twice a week   Worry food won't last until get money to buy more No   Food not last or not have enough money for food? No   Do you have housing? (Housing is defined as stable permanent housing and does not include staying ouside in a car, in a tent, in an abandoned building, in an overnight shelter, or couch-surfing.) Yes   Are you worried about losing your housing? No   Lack of transportation? No   Unable to get utilities (heat,electricity)? No         3/4/2025   Fall Risk   Fallen 2 or more times in the past year? No     No   Trouble with walking or balance? No     No       Proxy-reported    Multiple values from one day are sorted in reverse-chronological order          3/4/2025   Activities of Daily Living- Home Safety   Needs help with the following daily activites None of the above   Safety concerns in the home None of the above         3/4/2025   Dental   Dentist two times every year? (!) NO          3/4/2025   Hearing Screening   Hearing concerns? None of the above         3/4/2025   Driving Risk Screening   Patient/family members have concerns about driving No         3/4/2025   General Alertness/Fatigue Screening   Have you been more tired than usual lately? No         3/4/2025   Urinary Incontinence Screening   Bothered by leaking urine in past 6 months No            Today's PHQ-2 Score:       3/3/2025     1:22 PM   PHQ-2 (  [employer])   Q1: Little interest or pleasure in doing things 0   Q2: Feeling down, depressed or hopeless 0   PHQ-2 Score 0    Q1: Little interest or pleasure in doing things Not at all   Q2: Feeling down, depressed or hopeless Not at all   PHQ-2 Score 0       Patient-reported           3/4/2025   Substance Use   Alcohol more than 3/day or more than 7/wk No   Do you have a current opioid prescription? No   How severe/bad is pain from 1 to 10? 2/10   Do you use any other substances recreationally? No     Social History     Tobacco Use    Smoking status: Former     Current packs/day: 0.00     Types: Cigarettes     Quit date: 2023     Years since quittin.1    Smokeless tobacco: Former     Quit date: 2013   Vaping Use    Vaping status: Never Used   Substance Use Topics    Alcohol use: Yes    Drug use: No           2024   LAST FHS-7 RESULTS   1st degree relative breast or ovarian cancer Yes   Any relative bilateral breast cancer No   Any male have breast cancer No   Any ONE woman have BOTH breast AND ovarian cancer No   Any woman with breast cancer before 50yrs No   2 or more relatives with breast AND/OR ovarian cancer No   2 or more relatives with breast AND/OR bowel cancer Yes       No mammogram because of bilateral masectomy       History of abnormal Pap smear: Status post hysterectomy with removal of cervix and no history of CIN2 or greater or cervical cancer. Health Maintenance and Surgical History updated.       ASCVD Risk   The 10-year ASCVD risk  score (Shirley ADAM, et al., 2019) is: 13.7%    Values used to calculate the score:      Age: 65 years      Sex: Female      Is Non- : No      Diabetic: No      Tobacco smoker: No      Systolic Blood Pressure: 162 mmHg      Is BP treated: Yes      HDL Cholesterol: 48 mg/dL      Total Cholesterol: 161 mg/dL  Reviewed and updated as needed this visit by Provider                    Labs reviewed in EPIC  Current providers sharing in care for this patient include:  Patient Care Team:  Celso Anaya MD as PCP - General (Internal Medicine)  Philippe Esquivel MD as Assigned Heart and Vascular Provider  Sweta Brand DO as Physician (Medical Oncology)  Celso Anaya MD as Assigned PCP  Heydi Gutierrez APRN CNP as Assigned Cancer Care Provider  Yumiko Bell, RN as Specialty Care Coordinator (Hematology & Oncology)  Zeynep Shirley (Hematology & Oncology)    The following health maintenance items are reviewed in Epic and correct as of today:  Health Maintenance   Topic Date Due    SPIROMETRY  Never done    COPD ACTION PLAN  Never done    ZOSTER IMMUNIZATION (1 of 2) Never done    RSV VACCINE (1 - Risk 60-74 years 1-dose series) Never done    COLORECTAL CANCER SCREENING  11/13/2018    DTAP/TDAP/TD IMMUNIZATION (2 - Td or Tdap) 07/02/2023    COVID-19 Vaccine (3 - 2024-25 season) 09/01/2024    ANNUAL REVIEW OF HM ORDERS  01/16/2025    MEDICARE ANNUAL WELLNESS VISIT  02/27/2025    BMP  12/21/2025    LUNG CANCER SCREENING  12/21/2025    ALT  01/09/2026    FALL RISK ASSESSMENT  03/04/2026    GLUCOSE  12/21/2027    ADVANCE CARE PLANNING  06/03/2029    LIPID  01/09/2030    DEXA  04/15/2039    HEPATITIS C SCREENING  Completed    PHQ-2 (once per calendar year)  Completed    Pneumococcal Vaccine: 50+ Years  Completed    HPV IMMUNIZATION  Aged Out    MENINGITIS IMMUNIZATION  Aged Out    MAMMO SCREENING  Discontinued    INFLUENZA VACCINE  Discontinued         Review of Systems  CONSTITUTIONAL:  "NEGATIVE for fever, chills, change in weight  INTEGUMENTARY/SKIN: NEGATIVE for worrisome rashes, moles or lesions  EYES: NEGATIVE for vision changes or irritation  ENT/MOUTH: NEGATIVE for ear, mouth and throat problems  RESP:sob at times.   BREAST: NEGATIVE for masses, tenderness or discharge  CV: NEGATIVE for chest pain, palpitations or peripheral edema  GI: NEGATIVE for nausea, abdominal pain, heartburn, or change in bowel habits  : NEGATIVE for frequency, dysuria, or hematuria  MUSCULOSKELETAL: NEGATIVE for significant arthralgias or myalgia  NEURO: NEGATIVE for weakness, dizziness or paresthesias  ENDOCRINE: NEGATIVE for temperature intolerance, skin/hair changes  HEME: NEGATIVE for bleeding problems  PSYCHIATRIC: NEGATIVE for changes in mood or affect     Objective    Exam  BP (!) 162/85 (BP Location: Right arm, Patient Position: Sitting, Cuff Size: Adult Regular)   Pulse (!) 124   Temp 98.7  F (37.1  C) (Temporal)   Resp 18   Ht 1.63 m (5' 4.17\")   SpO2 96%   BMI 29.88 kg/m     Estimated body mass index is 29.88 kg/m  as calculated from the following:    Height as of this encounter: 1.63 m (5' 4.17\").    Weight as of 7/9/24: 79.4 kg (175 lb).    Physical Exam  GENERAL: alert and no distress  EYES: Eyes grossly normal to inspection, PERRL and conjunctivae and sclerae normal  HENT: ear canals and TM's normal, nose and mouth without ulcers or lesions  NECK: no adenopathy, no asymmetry, masses, or scars  RESP: lungs clear to auscultation - no rales, rhonchi or wheezes  CV: regular rate and rhythm, normal S1 S2, no S3 or S4, no murmur, click or rub, no peripheral edema  ABDOMEN: soft, nontender, no hepatosplenomegaly, no masses and bowel sounds normal  MS: no gross musculoskeletal defects noted, no edema  SKIN: no suspicious lesions or rashes  NEURO: Normal strength and tone, mentation intact and speech normal  PSYCH: mentation appears normal, affect normal/bright        3/4/2025   Mini Cog   Mini-Cog Not " Completed (choose reason) Patient declines       Patient declines, there are NO concerns for cognitive deficits.           Signed Electronically by: Celso Anaya MD

## 2025-03-12 ENCOUNTER — TELEPHONE (OUTPATIENT)
Dept: GASTROENTEROLOGY | Facility: CLINIC | Age: 65
End: 2025-03-12
Payer: MEDICARE

## 2025-03-12 NOTE — TELEPHONE ENCOUNTER
"Endoscopy Scheduling Screen    Have you had any respiratory illness or flu-like symptoms in the last 10 days?  No    What is your communication preference for Instructions and/or Bowel Prep?   MyChart    What insurance is in the chart?  Other:  MEDICARE    Ordering/Referring Provider: PHILIP ECHEVARRIA   (If ordering provider performs procedure, schedule with ordering provider unless otherwise instructed. )    BMI: Estimated body mass index is 29.88 kg/m  as calculated from the following:    Height as of 3/4/25: 1.63 m (5' 4.17\").    Weight as of 7/9/24: 79.4 kg (175 lb).     Sedation Ordered  moderate sedation.   If patient BMI > 50 do not schedule in [facility].    If patient BMI > 45 do not schedule at [facility].    Are you taking methadone or Suboxone?  NO, No RN review required.    Have you been diagnosed and are being treated for severe PTSD or severe anxiety?  NO, No RN review required.    Are you taking any prescription medications for pain 3 or more times per week?   NO, No RN review required.    Do you have a history of malignant hyperthermia?  No    (Females) Are you currently pregnant?   No     Have you been diagnosed or told you have pulmonary hypertension?   No    Do you have an LVAD?  No    Have you been told you have moderate to severe sleep apnea?  No.    Have you been told you have COPD, asthma, or any other lung disease?  Yes     What breathing problems do you have?  COPD     Do you use home oxygen?  No    Have your breathing problems required an ED visit or hospitalization in the last year?  No.    Has your doctor ordered any cardiac tests like echo, angiogram, stress test, ablation, or EKG, that you have not completed yet?  No    Do you  have a history of any heart conditions?  No     Have you ever had or are you waiting for an organ transplant?  No. Continue scheduling, no site restrictions.    Have you had a stroke or transient ischemic attack (TIA aka \"mini stroke\") in the last 2 years?   No.    Have you been " "diagnosed with or been told you have cirrhosis of the liver?   No.    Are you currently on dialysis?   No    Do you need assistance transferring?   No    BMI: Estimated body mass index is 29.88 kg/m  as calculated from the following:    Height as of 3/4/25: 1.63 m (5' 4.17\").    Weight as of 7/9/24: 79.4 kg (175 lb).     Is patients BMI > 40 and scheduling location [facility]?  No    Do you take an injectable or oral medication for weight loss or diabetes (excluding insulin)?  No    Do you take the medication Naltrexone?  No    Do you take blood thinners?  No       Prep   Are you currently on dialysis or do you have chronic kidney disease?  No    Do you have a diagnosis of diabetes?  No    Do you have a diagnosis of cystic fibrosis (CF)?  No    On a regular basis do you go 3 -5 days between bowel movements?  No    BMI > 40?  No    Preferred Pharmacy:    [id number] - [address] [address]  Phone: [phone] Fax: [phone]    Final Scheduling Details     Procedure scheduled  Colonoscopy    Surgeon:  COURTNEY     Date of procedure:  4/22/25     Pre-OP / PAC:   No - Not required for this site.    Location  [facility] - Per order.    Sedation   MAC/Deep Sedation  Per location.      Patient Reminders:   You will receive a call from a Nurse to review instructions and health history.  This assessment must be completed prior to your procedure.  Failure to complete the Nurse assessment may result in the procedure being cancelled.      On the day of your procedure, please designate an adult(s) who can drive you home stay with you for the next 24 hours. The medicines used in the exam will make you sleepy. You will not be able to drive.      You cannot take public transportation, ride share services, or non-medical taxi service without a responsible caregiver.  Medical transport services are allowed with the requirement that a responsible caregiver will receive you at your destination.  " We require that drivers and caregivers are confirmed prior to your procedure.

## 2025-04-02 ENCOUNTER — TELEPHONE (OUTPATIENT)
Dept: GASTROENTEROLOGY | Facility: CLINIC | Age: 65
End: 2025-04-02
Payer: MEDICARE

## 2025-04-02 NOTE — TELEPHONE ENCOUNTER
Caller: Jinny Smith   Reason for Reschedule/Cancellation (please be detailed, any staff messages or encounters to note?):     Per pt -- change date     Did you cancel or rescheduled an E[facility] procedure? No.    Is screening questionnaire older than 3 months from the reschedule date.   If Yes, please complete screening questionnaire. No    Prior to reschedule please review:  Ordering Provider:Celso Anaya MD i   Sedation Determined: mod   Does patient have any ASC Exclusions, please identify?: y - copd       Notes on Cancelled Procedure:  Procedure:Lower Endoscopy [Colonoscopy]   Date: 04/22/2025  Location:[facility]; [address] , [place] [address]  Surgeon: Alvin         Rescheduled: yes   Procedure: Lower Endoscopy [Colonoscopy]  Date: 04/30  Location: [facility]; [address] , [place] [address]  Surgeon: Long   Sedation Level Scheduled  mac          Reason for Sedation Level per site   Prep/Instructions updated and sent: mychart     Does patient need PAC or Pre -Op Rescheduled? : n

## 2025-04-02 NOTE — TELEPHONE ENCOUNTER
Bowel Prep Review:  Disclaimer: No call was made to the patient.     Standard Miralax bowel prep.    Recommended due to standard bowel prep.   Instructions were sent via [employer].       Elham Granda LPN  Endoscopy Procedure Pre Assessment

## 2025-04-15 ENCOUNTER — TELEPHONE (OUTPATIENT)
Dept: GASTROENTEROLOGY | Facility: CLINIC | Age: 65
End: 2025-04-15
Payer: MEDICARE

## 2025-04-15 NOTE — TELEPHONE ENCOUNTER
Pre visit planning completed.      Procedure details:    Patient scheduled for Colonoscopy on 4.30.25.     Arrival time: 1300. Procedure time 1400    [facility] [address] , [place] [address]. Check in location: Main entrance at Surgery registation desk.    Sedation type: MAC    Pre op exam needed? No.    Indication for procedure: screening      Chart review:     Electronic implanted devices? No    Recent diagnosis of diverticulitis within the last 6 weeks? No      Medication review:    Diabetic? No    Anticoagulants? No    Weight loss medication/injectable? No GLP-1 medication per patient's medication list. Nursing to verify with pre-assessment call.    Other medication HOLDING recommendations:  N/A      Prep for procedure:     Bowel prep recommendation: Standard Miralax.   Due to: standard bowel prep    Procedure information and instructions sent via [employer]         Lanie Ibrahim RN  Endoscopy Procedure Pre Assessment   [phone] option 3

## 2025-04-16 NOTE — TELEPHONE ENCOUNTER
Attempted to contact patient in order to complete pre assessment questions.     No answer. Left message to return call to [phone] option 3.    Callback communication sent via [employer].    Elham Granda LPN

## 2025-04-29 NOTE — H&P
[facility] Anesthesia Pre-op History and Physical    Jinny Smith MRN# [id number]   Age: 65 year old YOB: 1960      Date of Surgery: 4/30/2025 Location [facility]      Date of Exam 4/30/2025 Facility (In hospital)       Home clinic: [facility]  Primary care provider: Celso Anaya         Chief Complaint and/or Reason for Procedure:   No chief complaint on file.  Colonoscopy.  2013 hyperplastic, family hx.       Active problem list:     Patient Active Problem List    Diagnosis Date Noted    Post-op pain 06/27/2024     Priority: Medium    Adrenal nodule 01/02/2024     Priority: Medium    Mass of left breast, unspecified quadrant 01/02/2024     Priority: Medium    SBO (small bowel obstruction) (H) 12/31/2023     Priority: Medium    Tobacco use disorder 12/31/2023     Priority: Medium    Leukocytosis 12/31/2023     Priority: Medium    Hyponatremia 12/31/2023     Priority: Medium    Generalized anxiety disorder 09/10/2013     Priority: Medium     Diagnosis updated by automated process. Provider to review and confirm.      H/O: hysterectomy 07/02/2013     Priority: Medium    Hypertension goal BP (blood pressure) < 140/80 08/28/2012     Priority: Medium    Hyperlipidemia LDL goal <130 08/28/2012     Priority: Medium    Knee joint effusion 08/28/2012     Priority: Medium            Medications (include herbals and vitamins):   Any Plavix use in the last 7 days? No     No current facility-administered medications for this encounter.     Current Outpatient Medications   Medication Sig Dispense Refill    albuterol (PROAIR HFA/PROVENTIL HFA/VENTOLIN HFA) 108 (90 Base) MCG/ACT inhaler Inhale 2 puffs into the lungs every 3 hours as needed for shortness of breath. 6.7 g 5    anastrozole (ARIMIDEX) 1 MG tablet Take 1 tablet (1 mg) by mouth daily 90 tablet 3    atorvastatin (LIPITOR) 20 MG tablet Take 1 tablet (20 mg) by mouth daily. 90 tablet 3     calcium citrate-vitamin D (CITRACAL) 315-6.25 MG-MCG TABS per tablet Take by mouth daily      fluticasone-salmeterol (ADVAIR) 250-50 MCG/ACT inhaler Inhale 1 puff into the lungs every 12 hours. 60 each 11    lisinopril (ZESTRIL) 20 MG tablet TAKE 2 TABLETS (40MG) BY MOUTH EVERY  tablet 2    metoprolol succinate ER (TOPROL XL) 50 MG 24 hr tablet Take 1 tablet (50 mg) by mouth daily. 90 tablet 3             Allergies:      Allergies   Allergen Reactions    Lorazepam Itching and Rash     Rash on chest and upper neck after 20 minutes. Did also get itchy.     Allergy to Latex? No  Allergy to tape?   No  Intolerances:             Physical Exam:   All vitals have been reviewed  No data found.  No intake/output data recorded.  Lungs:   No increased work of breathing, good air exchange, clear to auscultation bilaterally, no crackles or wheezing     Cardiovascular:   Normal apical impulse, regular rate and rhythm, normal S1 and S2, no S3 or S4, and no murmur noted             Lab / Radiology Results:            Anesthetic risk and/or ASA classification:       Prasanna Gonzalez MD

## 2025-04-30 ENCOUNTER — HOSPITAL ENCOUNTER (OUTPATIENT)
Facility: CLINIC | Age: 65
Discharge: HOME | End: 2025-04-30
Attending: INTERNAL MEDICINE | Admitting: INTERNAL MEDICINE
Payer: MEDICARE

## 2025-04-30 ENCOUNTER — ANESTHESIA EVENT (OUTPATIENT)
Dept: GASTROENTEROLOGY | Facility: CLINIC | Age: 65
End: 2025-04-30
Payer: MEDICARE

## 2025-04-30 ENCOUNTER — ANESTHESIA (OUTPATIENT)
Dept: GASTROENTEROLOGY | Facility: CLINIC | Age: 65
End: 2025-04-30
Payer: MEDICARE

## 2025-04-30 VITALS
DIASTOLIC BLOOD PRESSURE: 73 MMHG | RESPIRATION RATE: 16 BRPM | SYSTOLIC BLOOD PRESSURE: 98 MMHG | OXYGEN SATURATION: 93 % | TEMPERATURE: 97.5 F | HEART RATE: 84 BPM

## 2025-04-30 LAB — COLONOSCOPY: NORMAL

## 2025-04-30 PROCEDURE — 370N000017 HC ANESTHESIA TECHNICAL FEE, PER MIN: Performed by: INTERNAL MEDICINE

## 2025-04-30 PROCEDURE — 250N000011 HC RX IP 250 OP 636: Performed by: NURSE ANESTHETIST, CERTIFIED REGISTERED

## 2025-04-30 PROCEDURE — 45380 COLONOSCOPY AND BIOPSY: CPT | Performed by: INTERNAL MEDICINE

## 2025-04-30 PROCEDURE — 258N000003 HC RX IP 258 OP 636: Performed by: NURSE ANESTHETIST, CERTIFIED REGISTERED

## 2025-04-30 PROCEDURE — 88305 TISSUE EXAM BY PATHOLOGIST: CPT | Mod: TC | Performed by: INTERNAL MEDICINE

## 2025-04-30 RX ORDER — ONDANSETRON 2 MG/ML
4 INJECTION INTRAMUSCULAR; INTRAVENOUS EVERY 30 MIN PRN
Status: DISCONTINUED | OUTPATIENT
Start: 2025-04-30 | End: 2025-04-30 | Stop reason: HOSPADM

## 2025-04-30 RX ORDER — NALOXONE HYDROCHLORIDE 0.4 MG/ML
0.1 INJECTION, SOLUTION INTRAMUSCULAR; INTRAVENOUS; SUBCUTANEOUS
Status: DISCONTINUED | OUTPATIENT
Start: 2025-04-30 | End: 2025-04-30 | Stop reason: HOSPADM

## 2025-04-30 RX ORDER — ONDANSETRON 4 MG/1
4 TABLET, ORALLY DISINTEGRATING ORAL EVERY 30 MIN PRN
Status: DISCONTINUED | OUTPATIENT
Start: 2025-04-30 | End: 2025-04-30 | Stop reason: HOSPADM

## 2025-04-30 RX ORDER — PROPOFOL 10 MG/ML
INJECTION, EMULSION INTRAVENOUS PRN
Status: DISCONTINUED | OUTPATIENT
Start: 2025-04-30 | End: 2025-04-30

## 2025-04-30 RX ORDER — SODIUM CHLORIDE, SODIUM LACTATE, POTASSIUM CHLORIDE, CALCIUM CHLORIDE 600; 310; 30; 20 MG/100ML; MG/100ML; MG/100ML; MG/100ML
INJECTION, SOLUTION INTRAVENOUS CONTINUOUS
Status: DISCONTINUED | OUTPATIENT
Start: 2025-04-30 | End: 2025-04-30 | Stop reason: HOSPADM

## 2025-04-30 RX ORDER — DEXAMETHASONE SODIUM PHOSPHATE 10 MG/ML
4 INJECTION, SOLUTION INTRAMUSCULAR; INTRAVENOUS
Status: DISCONTINUED | OUTPATIENT
Start: 2025-04-30 | End: 2025-04-30 | Stop reason: HOSPADM

## 2025-04-30 RX ADMIN — PROPOFOL 100 MG: 10 INJECTION, EMULSION INTRAVENOUS at 13:43

## 2025-04-30 RX ADMIN — SODIUM CHLORIDE, SODIUM LACTATE, POTASSIUM CHLORIDE, AND CALCIUM CHLORIDE: .6; .31; .03; .02 INJECTION, SOLUTION INTRAVENOUS at 13:26

## 2025-04-30 RX ADMIN — PROPOFOL 250 MCG/KG/MIN: 10 INJECTION, EMULSION INTRAVENOUS at 13:44

## 2025-04-30 ASSESSMENT — ACTIVITIES OF DAILY LIVING (ADL)
ADLS_ACUITY_SCORE: 50
ADLS_ACUITY_SCORE: 50

## 2025-04-30 ASSESSMENT — COPD QUESTIONNAIRES
COPD: 1
CAT_SEVERITY: MILD

## 2025-04-30 ASSESSMENT — LIFESTYLE VARIABLES: TOBACCO_USE: 1

## 2025-04-30 NOTE — LETTER
May 5, 2025      Jinny YAN Luis  [address]  [place] [address]        Dear ,    We are writing to inform you of your test results.    Benign polyps removed. A repeat exam in 5 yrs with the family history is suggested.    Resulted Orders   Surgical Pathology Exam   Result Value Ref Range    Case Report       Surgical Pathology Report                         Case: [id number]                                  Authorizing Provider:  Prasanna Gonzalez MD        Collected:           04/30/2025 02:00 PM          Ordering Location:     [facility]          Received:            04/30/2025 02:09 PM                                 [facility] Endoscopy                                                          Pathologist:           Shireen Hodges MD                                                          Specimen:    Rectum, Rectal Polyps.                                                                     Final Diagnosis       A. Colon, Rectum, polyps x6, polypectomy:  -Hyperplastic polyps  -Negative for dysplasia or malignancy.        Clinical Information       Procedure:  COLONOSCOPY, WITH BIOPSY  Pre-op Diagnosis: Screen for colon cancer [Z12.11]  Post-op Diagnosis: Z12.11 - Screen for colon cancer [ICD-10-CM]      Gross Description       A(1). Rectum, Rectal Polyps.:  The specimen is received in formalin, labeled with the patient's name, medical record number and other identifying information and designated  rectal polyps . It consists of 7 tan soft tissue fragments ranging from 0.2-0.6 cm. Entirely submitted in one cassette.   (SHAKIR Lewis (ASCP) 5/1/2025 9:37 AM       Microscopic Description       Microscopic examination was performed.        Performing Labs       The technical component of this testing was completed at [facility].    Stain controls for all stains resulted within this report have been reviewed and show  appropriate reactivity.       Case Images         If you have any questions or concerns, please call the clinic at the number listed above.       Sincerely,      Prasanna Gonzalez MD    Electronically signed

## 2025-04-30 NOTE — DISCHARGE INSTRUCTIONS
[facility]    Home Care Following Endoscopy          Activity:  You have just undergone an endoscopic procedure usually performed with conscious sedation.  Do not work or operate machinery (including a car) for at least 12 hours.    I encourage you to walk and attempt to pass this air as soon as possible.    Diet:  Return to the diet you were on before your procedure but eat lightly for the first 12-24 hours.  Drink plenty of water.  Resume any regular medications unless otherwise advised by your physician.  Please begin any new medication prescribed as a result of your procedure as directed by your physician.   If you had any biopsy or polyp removed please refrain from aspirin or aspirin products for 2 days.  If on Coumadin please restart as instructed by your physician.   Pain:  You may take Tylenol as needed for pain.  Expected Recovery:  You can expect some mild abdominal fullness and/or discomfort due to the air used to inflate your intestinal tract. It is also normal to have a mild sore throat after upper endoscopy.    Call Your Physician if You Have:  After Colonoscopy:  Worsening persisting abdominal pain which is worse with activity.  Fevers (>101 degrees F), chills or shakes.  Passage of continued blood with bowel movements.     Any questions or concerns about your recovery, please call [phone] or after hours [phone] ([phone]) Nurse Advice Line.    Follow-up Care:  You did have polyps/biopsy tissue sample(s) removed.  The polyps/biopsy tissue sample(s) will be sent to pathology.    You should receive letter in your My Chart from Dr Gonzalez with your results within 1-2 weeks. If you do not participate in My Chart a physical letter will come in the mail in 2-3 weeks.  Please call if you have not received a notification of your results.  If asked to return to clinic please make an appointment 1 week after your procedure.  Call [phone].

## 2025-04-30 NOTE — ANESTHESIA POSTPROCEDURE EVALUATION
Patient: Jinny Smith    Procedure: Procedure(s):  COLONOSCOPY, WITH BIOPSY       Anesthesia Type:  MAC    Note:  Disposition: Outpatient   Postop Pain Control: Uneventful            Sign Out: Well controlled pain   PONV: No   Neuro/Psych: Uneventful            Sign Out: Acceptable/Baseline neuro status   Airway/Respiratory: Uneventful            Sign Out: Acceptable/Baseline resp. status   CV/Hemodynamics: Uneventful            Sign Out: Acceptable CV status; No obvious hypovolemia; No obvious fluid overload   Other NRE: NONE   DID A NON-ROUTINE EVENT OCCUR? No           Last vitals:  Vitals Value Taken Time   /67 04/30/25 1420   Temp     Pulse 87 04/30/25 1420   Resp     SpO2 96 % 04/30/25 1430   Vitals shown include unfiled device data.    Electronically Signed By: JOANNA Rivas CRNA  April 30, 2025  2:31 PM

## 2025-04-30 NOTE — ANESTHESIA CARE TRANSFER NOTE
Patient: Jinny Smith    Procedure: Procedure(s):  COLONOSCOPY, WITH BIOPSY       Diagnosis: Screen for colon cancer [Z12.11]  Diagnosis Additional Information: No value filed.    Anesthesia Type:   MAC     Note:    Oropharynx: spontaneously breathing  Level of Consciousness: awake  Oxygen Supplementation: room air    Independent Airway: airway patency satisfactory and stable  Dentition: dentition unchanged  Vital Signs Stable: post-procedure vital signs reviewed and stable  Report to RN Given: handoff report given  Patient transferred to: Phase II    Handoff Report: Identifed the Patient, Identified the Reponsible Provider, Reviewed the pertinent medical history, Discussed the surgical course, Reviewed Intra-OP anesthesia mangement and issues during anesthesia, Set expectations for post-procedure period and Allowed opportunity for questions and acknowledgement of understanding      Vitals:  Vitals Value Taken Time   /70 04/30/25 1408   Temp     Pulse 98 04/30/25 1408   Resp 16    SpO2  92% 04/30/25 1409   Vitals shown include unfiled device data.    Electronically Signed By: JOANNA Rivas CRNA  April 30, 2025  2:27 PM

## 2025-04-30 NOTE — ANESTHESIA PREPROCEDURE EVALUATION
Anesthesia Pre-Procedure Evaluation    Patient: Jinny Smith   MRN: [id number] : 1960        Procedure : Procedure(s):  Colonoscopy, Screening          Past Medical History:   Diagnosis Date    Family history of colon cancer     Hyperlipidemia LDL goal <130 2012    Hyperplastic colonic polyp     Hypertension goal BP (blood pressure) < 140/80 2012    Knee joint effusion 2012      Past Surgical History:   Procedure Laterality Date    BIOPSY BREAST NEEDLE LOCALIZATION, BIOPSY NODE SENTINEL, COMBINED Left 3/27/2024    Procedure: BIOPSY, LEFT BREAST, WITH NEEDLE LOCALIZATION AND SENTINEL LYMPH NODE BIOPSY;  Surgeon: Philippe Esquivel MD;  Location: [facility] OR    GYN SURGERY      HYSTERECTOMY TOTAL ABDOMINAL      LAPAROSCOPY DIAGNOSTIC (GENERAL) N/A 2024    Procedure: LAPAROSCOPY, DIAGNOSTIC, laparoscopic lysis of adhesions;  Surgeon: Philippe Esquivel MD;  Location: [facility] OR    LAPAROTOMY, LYSIS ADHESIONS, COMBINED N/A 2024    Procedure: LAPAROTOMY, EXPLORATORY, WITH open LYSIS OF ADHESIONS, reduction of internal hernia;  Surgeon: Philippe Esquivel MD;  Location: [facility] OR    MASTECTOMY SIMPLE BILATERAL Bilateral 2024    Procedure: Bilateral mastectomy;  Surgeon: Philippe Esquivel MD;  Location: [facility] OR      Allergies   Allergen Reactions    Lorazepam Itching and Rash     Rash on chest and upper neck after 20 minutes. Did also get itchy.      Social History     Tobacco Use    Smoking status: Former     Current packs/day: 0.00     Types: Cigarettes     Quit date: 2023     Years since quittin.3    Smokeless tobacco: Former     Quit date: 2013   Substance Use Topics    Alcohol use: Yes      Wt Readings from Last 1 Encounters:   24 79.4 kg (175 lb)        Anesthesia Evaluation   Pt has had prior anesthetic. Type: MAC and General.    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  Cath:  Date: Results:      METS/Exercise Tolerance:     Hematologic:  - neg hematologic  ROS     Musculoskeletal:  - neg musculoskeletal ROS     GI/Hepatic:  - neg GI/hepatic ROS   (+)        bowel prep,            Renal/Genitourinary:  - neg Renal ROS     Endo:  - neg endo ROS     Psychiatric/Substance Use:     (+) psychiatric history anxiety       Infectious Disease:  - neg infectious disease ROS     Malignancy:   (+) Malignancy, History of Breast.Breast CA Remission status post Surgery.      Other:  - neg other ROS          Physical Exam    Airway  airway exam normal      Mallampati: II   TM distance: > 3 FB   Neck ROM: full   Mouth opening: > 3 cm    Respiratory Devices and Support         Dental       (+) Modest Abnormalities - crowns, retainers, 1 or 2 missing teeth      Cardiovascular   cardiovascular exam normal       Rhythm and rate: regular and tachycardia     Pulmonary   pulmonary exam normal        breath sounds clear to auscultation           OUTSIDE LABS:  CBC:   Lab Results   Component Value Date    WBC 10.1 01/09/2025    WBC 7.4 12/21/2024    HGB 12.5 01/09/2025    HGB 12.2 12/21/2024    HCT 38.3 01/09/2025    HCT 38.3 01/09/2025     01/09/2025     12/21/2024     BMP:   Lab Results   Component Value Date     12/21/2024     07/09/2024    POTASSIUM 4.7 12/21/2024    POTASSIUM 5.4 (H) 07/09/2024    CHLORIDE 102 12/21/2024    CHLORIDE 105 07/09/2024    CO2 18 (L) 12/21/2024    CO2 24 07/09/2024    BUN 28.9 (H) 12/21/2024    BUN 19.8 07/09/2024    CR 0.89 12/21/2024    CR 0.81 07/09/2024     (H) 12/21/2024    GLC 95 07/09/2024     COAGS:   Lab Results  "  Component Value Date    INR 1.11 01/02/2024     POC: No results found for: \"BGM\", \"HCG\", \"HCGS\"  HEPATIC:   Lab Results   Component Value Date    ALBUMIN 4.6 01/09/2025    PROTTOTAL 8.0 01/09/2025    ALT 29 01/09/2025    AST 21 01/09/2025    ALKPHOS 68 01/09/2025    BILITOTAL 0.4 01/09/2025     OTHER:   Lab Results   Component Value Date    LACT 1.1 12/31/2023    A1C 5.6 03/15/2024    KAREN 10.1 12/21/2024    MAG 1.8 01/05/2024    LIPASE 18 12/31/2023    TSH 0.89 07/09/2024    CRP 7.1 08/10/2012    SED 9 08/10/2012       Anesthesia Plan    ASA Status:  2    NPO Status:  NPO Appropriate    Anesthesia Type: MAC.     - Reason for MAC: immobility needed   Induction: Intravenous, Propofol.   Maintenance: TIVA.        Consents    Anesthesia Plan(s) and associated risks, benefits, and realistic alternatives discussed. Questions answered and patient/representative(s) expressed understanding.     - Discussed: Risks, Benefits and Alternatives for BOTH SEDATION and the PROCEDURE were discussed     - Discussed with:  Patient      - Extended Intubation/Ventilatory Support Discussed: No.      - Patient is DNR/DNI Status: No     Use of blood products discussed: No .     Postoperative Care       PONV prophylaxis: Background Propofol Infusion     Comments:               JOANNA Rivas CRNA    Clinically Significant Risk Factors Present on Admission                   # Hypertension: Noted on problem list                        "

## 2025-05-02 PROCEDURE — 88305 TISSUE EXAM BY PATHOLOGIST: CPT | Mod: 26 | Performed by: PATHOLOGY

## 2025-07-02 DIAGNOSIS — C50.112 MALIGNANT NEOPLASM OF CENTRAL PORTION OF LEFT FEMALE BREAST, UNSPECIFIED ESTROGEN RECEPTOR STATUS (H): Primary | ICD-10-CM

## 2025-07-02 DIAGNOSIS — R73.09 OTHER ABNORMAL GLUCOSE: ICD-10-CM

## 2025-07-17 ENCOUNTER — LAB (OUTPATIENT)
Dept: LAB | Facility: CLINIC | Age: 65
End: 2025-07-17
Payer: MEDICARE

## 2025-07-17 ENCOUNTER — ONCOLOGY VISIT (OUTPATIENT)
Dept: ONCOLOGY | Facility: CLINIC | Age: 65
End: 2025-07-17
Payer: MEDICARE

## 2025-07-17 VITALS
WEIGHT: 170 LBS | OXYGEN SATURATION: 97 % | RESPIRATION RATE: 19 BRPM | HEIGHT: 64 IN | DIASTOLIC BLOOD PRESSURE: 70 MMHG | BODY MASS INDEX: 29.02 KG/M2 | TEMPERATURE: 98 F | HEART RATE: 85 BPM | SYSTOLIC BLOOD PRESSURE: 110 MMHG

## 2025-07-17 DIAGNOSIS — R73.09 OTHER ABNORMAL GLUCOSE: ICD-10-CM

## 2025-07-17 DIAGNOSIS — C50.112 MALIGNANT NEOPLASM OF CENTRAL PORTION OF LEFT FEMALE BREAST, UNSPECIFIED ESTROGEN RECEPTOR STATUS (H): ICD-10-CM

## 2025-07-17 DIAGNOSIS — T50.905A DRUG-INDUCED NAUSEA AND VOMITING: ICD-10-CM

## 2025-07-17 DIAGNOSIS — R11.2 DRUG-INDUCED NAUSEA AND VOMITING: ICD-10-CM

## 2025-07-17 DIAGNOSIS — Z79.811 USE OF AROMATASE INHIBITORS: ICD-10-CM

## 2025-07-17 DIAGNOSIS — C50.112 MALIGNANT NEOPLASM OF CENTRAL PORTION OF LEFT FEMALE BREAST, UNSPECIFIED ESTROGEN RECEPTOR STATUS (H): Primary | ICD-10-CM

## 2025-07-17 LAB
ALBUMIN SERPL BCG-MCNC: 4.5 G/DL (ref 3.5–5.2)
ALP SERPL-CCNC: 74 U/L (ref 40–150)
ALT SERPL W P-5'-P-CCNC: 26 U/L (ref 0–50)
ANION GAP SERPL CALCULATED.3IONS-SCNC: 13 MMOL/L (ref 7–15)
AST SERPL W P-5'-P-CCNC: 15 U/L (ref 0–45)
BASOPHILS # BLD AUTO: 0.1 10E3/UL (ref 0–0.2)
BASOPHILS NFR BLD AUTO: 1 %
BILIRUB SERPL-MCNC: 0.5 MG/DL
BUN SERPL-MCNC: 22.6 MG/DL (ref 8–23)
CALCIUM SERPL-MCNC: 10 MG/DL (ref 8.8–10.4)
CHLORIDE SERPL-SCNC: 102 MMOL/L (ref 98–107)
CREAT SERPL-MCNC: 0.85 MG/DL (ref 0.51–0.95)
EGFRCR SERPLBLD CKD-EPI 2021: 75 ML/MIN/1.73M2
EOSINOPHIL # BLD AUTO: 0.3 10E3/UL (ref 0–0.7)
EOSINOPHIL NFR BLD AUTO: 3 %
ERYTHROCYTE [DISTWIDTH] IN BLOOD BY AUTOMATED COUNT: 12.6 % (ref 10–15)
EST. AVERAGE GLUCOSE BLD GHB EST-MCNC: 120 MG/DL
GLUCOSE SERPL-MCNC: 104 MG/DL (ref 70–99)
HBA1C MFR BLD: 5.8 %
HCO3 SERPL-SCNC: 20 MMOL/L (ref 22–29)
HCT VFR BLD AUTO: 37.7 % (ref 35–47)
HGB BLD-MCNC: 12.6 G/DL (ref 11.7–15.7)
IMM GRANULOCYTES # BLD: 0.1 10E3/UL
IMM GRANULOCYTES NFR BLD: 1 %
LYMPHOCYTES # BLD AUTO: 3 10E3/UL (ref 0.8–5.3)
LYMPHOCYTES NFR BLD AUTO: 33 %
MCH RBC QN AUTO: 31.8 PG (ref 26.5–33)
MCHC RBC AUTO-ENTMCNC: 33.4 G/DL (ref 31.5–36.5)
MCV RBC AUTO: 95 FL (ref 78–100)
MONOCYTES # BLD AUTO: 0.7 10E3/UL (ref 0–1.3)
MONOCYTES NFR BLD AUTO: 8 %
NEUTROPHILS # BLD AUTO: 5.1 10E3/UL (ref 1.6–8.3)
NEUTROPHILS NFR BLD AUTO: 55 %
NRBC # BLD AUTO: 0 10E3/UL
NRBC BLD AUTO-RTO: 0 /100
PLATELET # BLD AUTO: 247 10E3/UL (ref 150–450)
POTASSIUM SERPL-SCNC: 5 MMOL/L (ref 3.4–5.3)
PROT SERPL-MCNC: 8.1 G/DL (ref 6.4–8.3)
RBC # BLD AUTO: 3.96 10E6/UL (ref 3.8–5.2)
SODIUM SERPL-SCNC: 135 MMOL/L (ref 135–145)
WBC # BLD AUTO: 9.3 10E3/UL (ref 4–11)

## 2025-07-17 RX ORDER — ATORVASTATIN CALCIUM 20 MG/1
20 TABLET, FILM COATED ORAL DAILY
Qty: 90 TABLET | Refills: 3 | Status: SHIPPED | OUTPATIENT
Start: 2025-07-17

## 2025-07-17 ASSESSMENT — PAIN SCALES - GENERAL: PAINLEVEL_OUTOF10: NO PAIN (0)

## 2025-07-17 NOTE — LETTER
"2025      Jinny Smith  [address]  [facility] [address]      Dear Colleague,    Thank you for referring your patient, Jinny Smith, to the [facility] [place]. Please see a copy of my visit note below.    [facility] Hematology / Oncology  Progress Note  Name: Jinny Smith  :  1960  MRN:  [id number]    --------------------    Subjective:  Jinny returns for follow-up of breast cancer unaccompanied.  Since our last visit, Jinny has remained overall well from a health standpoint.  Unfortunately, she continues to deal with chronic nausea suspected secondary to anastrozole use.  No breast or lymphedema concerns.    --------------------    Objective:  VS: /70 (BP Location: Right arm, Patient Position: Sitting, Cuff Size: Adult Large)   Pulse 85   Temp 98  F (36.7  C) (Temporal)   Resp 19   Ht 1.626 m (5' 4\")   Wt 77.1 kg (170 lb)   SpO2 97%   BMI 29.18 kg/m    Gen: Well-appearing.    We reviewed surgical pathology report, Oncotype DX score.    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  # Mar 2024 Left breast lumpectomy w/ sentinel node biopsy (Dr. Esquivel).  # Mar 2024 Path 30 mm, grade 1, background grade 2 DCIS, no LVI, close margins (0.1 mm invasive, 0.8 mm DCIS), 3/3 negative nodes; ER %, WA %, HER2 negative by FISH.  # Mar 2024 Oncotype Dx score 11; adjuvant chemotherapy not indicated.  # 2024 BRCA 1/2 and 11-gene hereditary cancer breast actionable panel negative.   Bilateral mastectomies (Dr. Esquivel).   Path no residual disease (ADH present).  # 2024 Initiated adjuvant Arimidex.  Normal bone density.    Jinny and I reviewed option given concerns for Arimidex nausea.  These options include 1) treatment cessation, 2) switch to " alternative AI, 3) switch to Tamoxifen.  After risk benefit discussion, we agreed to discontinue Arimidex at this time.  We agreed that if nausea persists despite drug cessation, further evaluation will be warranted.    Patient Instructions   TRAV 3 months w/ labs (CBC, CMP).    Brian Dewey MD.    Again, thank you for allowing me to participate in the care of your patient.        Sincerely,        Brian Dewey MD    Electronically signed

## 2025-07-18 NOTE — PROGRESS NOTES
"[facility] Hematology / Oncology  Progress Note  Name: Jinny Smith  :  1960  MRN:  [id number]    --------------------    Subjective:  Jinny returns for follow-up of breast cancer unaccompanied.  Since our last visit, Jinny has remained overall well from a health standpoint.  Unfortunately, she continues to deal with chronic nausea suspected secondary to anastrozole use.  No breast or lymphedema concerns.    --------------------    Objective:  VS: /70 (BP Location: Right arm, Patient Position: Sitting, Cuff Size: Adult Large)   Pulse 85   Temp 98  F (36.7  C) (Temporal)   Resp 19   Ht 1.626 m (5' 4\")   Wt 77.1 kg (170 lb)   SpO2 97%   BMI 29.18 kg/m    Gen: Well-appearing.    We reviewed surgical pathology report, Oncotype DX score.    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  # Mar 2024 Left breast lumpectomy w/ sentinel node biopsy (Dr. Esquivel).  # Mar 2024 Path 30 mm, grade 1, background grade 2 DCIS, no LVI, close margins (0.1 mm invasive, 0.8 mm DCIS), 3/3 negative nodes; ER %, SD %, HER2 negative by FISH.  # Mar 2024 Oncotype Dx score 11; adjuvant chemotherapy not indicated.  # 2024 BRCA 1/2 and 11-gene hereditary cancer breast actionable panel negative.   Bilateral mastectomies (Dr. Esquivel).   Path no residual disease (ADH present).   Initiated adjuvant Arimidex.  Normal bone density.    Jinny and I reviewed option given concerns for Arimidex nausea.  These options include 1) treatment cessation, 2) switch to alternative AI, 3) switch to Tamoxifen.  After risk benefit discussion, we agreed to discontinue Arimidex at this time.  We agreed that if nausea persists despite drug cessation, further evaluation will be warranted.    Patient Instructions   TRAV " 3 months w/ labs (CBC, CMP).    Brian Dewey MD.

## 2025-07-21 DIAGNOSIS — I10 HYPERTENSION GOAL BP (BLOOD PRESSURE) < 140/80: ICD-10-CM

## 2025-07-21 RX ORDER — LISINOPRIL 20 MG/1
40 TABLET ORAL DAILY
Qty: 180 TABLET | Refills: 2 | OUTPATIENT
Start: 2025-07-21

## (undated) DEVICE — COVER TRANSDUCER PROBE 7X24" 610-575

## (undated) DEVICE — SUCTION MANIFOLD NEPTUNE 2 SYS 4 PORT 0702-020-000

## (undated) DEVICE — ESU PENCIL W/SMOKE EVAC

## (undated) DEVICE — SU MONOCRYL 4-0 PS-2 18" UND Y496G

## (undated) DEVICE — BNDG COBAN 4"X5YDS STERILE

## (undated) DEVICE — SOL WATER IRRIG 1000ML BOTTLE 2F7114

## (undated) DEVICE — PACK MINOR PROCEDURE CUSTOM

## (undated) DEVICE — DRAIN JACKSON PRATT 10MM FLAT 4/4 PERF SU130-1311

## (undated) DEVICE — SOL ADH LIQUID BENZOIN SWAB 0.6ML C1544

## (undated) DEVICE — ESU LIGASURE LAPAROSCOPIC BLUNT TIP SEALER 5MMX37CM LF1837

## (undated) DEVICE — SU PDS II 1 CT-1 MONOFIL Z347H

## (undated) DEVICE — ENDO FORCEP ENDOJAW BIOPSY 2.8MMX230CM FB-220U

## (undated) DEVICE — SOL NACL 0.9% INJ 1000ML BAG 07983-09

## (undated) DEVICE — PREP CHLORAPREP 26ML TINTED ORANGE  260815

## (undated) DEVICE — SU VICRYL 3-0 SH 27" UND J416H

## (undated) DEVICE — MARKER MARGIN MARKER STD 6 COLOR SGL USE MMS6

## (undated) DEVICE — KIT ENDO TURNOVER/PROCEDURE CARRY-ON 101822

## (undated) DEVICE — PHOTON GUIDE INVUITY WIDE FLAT 104015

## (undated) DEVICE — ESU ELEC BLADE HEX-LOCKING 2.5" E1450X

## (undated) DEVICE — Device

## (undated) DEVICE — BLADE KNIFE SURG 15 371115

## (undated) DEVICE — SUCTION TIP POOLE K770

## (undated) DEVICE — DRAPE LAP W/ARMBOARD 29410

## (undated) DEVICE — SUCTION TIP YANKAUER W/O VENT BULBOUS TIP

## (undated) DEVICE — SPONGE LAP 18X18" 1515

## (undated) DEVICE — SOL NACL 0.9% IRRIG 1000ML BOTTLE 07138-09

## (undated) DEVICE — GLOVE BIOGEL PI ULTRATOUCH G SZ 7.5 42175

## (undated) DEVICE — SUCTION MANIFOLD NEPTUNE 2 SYS 1 PORT 702-025-000

## (undated) DEVICE — STOCKING SLEEVE COMPRESSION CALF MED

## (undated) DEVICE — SU VICRYL 4-0 PS-2 27" UND J426H

## (undated) DEVICE — SYR BULB IRRIG DOVER 60 ML LATEX FREE 67000

## (undated) DEVICE — ENDO TROCAR 05MM VERSAPORT BLADED W/FIX CANNULA 179094F

## (undated) DEVICE — SU VICRYL 3-0 TIE 12X18" UND J110T

## (undated) DEVICE — SU ETHILON 3-0 PS-1 18" 1663H

## (undated) DEVICE — DRAPE STOCKINETTE IMPERVIOUS 12" 1587

## (undated) DEVICE — CATH TRAY FOLEY 16FR SIL

## (undated) DEVICE — DRAPE BREAST/CHEST 29420

## (undated) DEVICE — DRSG DRAIN 4X4" 7086

## (undated) DEVICE — SYR 10ML FINGER CONTROL W/O NDL 309695

## (undated) DEVICE — NDL ECLIPSE 25GA 1.5"

## (undated) DEVICE — DRAIN JACKSON PRATT RESERVOIR 100ML SU130-1305

## (undated) DEVICE — TUBING SUCTION 12"X1/4" N612

## (undated) DEVICE — SU SILK 2-0 SH 30" K833H

## (undated) DEVICE — TUBING SUCTION 6"X3/16" N56A

## (undated) DEVICE — BLADE KNIFE SURG 10 371110

## (undated) DEVICE — PACK GENERAL LAPAOSCOPY

## (undated) DEVICE — DRSG ABDOMINAL 07 1/2X8" 7197D

## (undated) RX ORDER — BUPIVACAINE HYDROCHLORIDE 2.5 MG/ML
INJECTION, SOLUTION EPIDURAL; INFILTRATION; INTRACAUDAL
Status: DISPENSED
Start: 2024-01-02

## (undated) RX ORDER — FENTANYL CITRATE 50 UG/ML
INJECTION, SOLUTION INTRAMUSCULAR; INTRAVENOUS
Status: DISPENSED
Start: 2024-06-26

## (undated) RX ORDER — HYDROMORPHONE HYDROCHLORIDE 1 MG/ML
INJECTION, SOLUTION INTRAMUSCULAR; INTRAVENOUS; SUBCUTANEOUS
Status: DISPENSED
Start: 2024-06-26

## (undated) RX ORDER — FENTANYL CITRATE-0.9 % NACL/PF 10 MCG/ML
PLASTIC BAG, INJECTION (ML) INTRAVENOUS
Status: DISPENSED
Start: 2024-03-27

## (undated) RX ORDER — CEFAZOLIN SODIUM/WATER 2 G/20 ML
SYRINGE (ML) INTRAVENOUS
Status: DISPENSED
Start: 2024-03-27

## (undated) RX ORDER — BUPIVACAINE HYDROCHLORIDE AND EPINEPHRINE 2.5; 5 MG/ML; UG/ML
INJECTION, SOLUTION EPIDURAL; INFILTRATION; INTRACAUDAL; PERINEURAL
Status: DISPENSED
Start: 2024-06-26

## (undated) RX ORDER — DEXAMETHASONE SODIUM PHOSPHATE 10 MG/ML
INJECTION, SOLUTION INTRAMUSCULAR; INTRAVENOUS
Status: DISPENSED
Start: 2024-01-02

## (undated) RX ORDER — ONDANSETRON 2 MG/ML
INJECTION INTRAMUSCULAR; INTRAVENOUS
Status: DISPENSED
Start: 2024-01-02

## (undated) RX ORDER — FENTANYL CITRATE 50 UG/ML
INJECTION, SOLUTION INTRAMUSCULAR; INTRAVENOUS
Status: DISPENSED
Start: 2024-03-27

## (undated) RX ORDER — LIDOCAINE HYDROCHLORIDE 10 MG/ML
INJECTION, SOLUTION INFILTRATION; PERINEURAL
Status: DISPENSED
Start: 2024-03-27

## (undated) RX ORDER — KETOROLAC TROMETHAMINE 30 MG/ML
INJECTION, SOLUTION INTRAMUSCULAR; INTRAVENOUS
Status: DISPENSED
Start: 2024-06-26

## (undated) RX ORDER — EPHEDRINE SULFATE 50 MG/ML
INJECTION, SOLUTION INTRAMUSCULAR; INTRAVENOUS; SUBCUTANEOUS
Status: DISPENSED
Start: 2024-03-27

## (undated) RX ORDER — BUPIVACAINE HYDROCHLORIDE 5 MG/ML
INJECTION, SOLUTION EPIDURAL; INTRACAUDAL
Status: DISPENSED
Start: 2024-01-02

## (undated) RX ORDER — FENTANYL CITRATE-0.9 % NACL/PF 10 MCG/ML
PLASTIC BAG, INJECTION (ML) INTRAVENOUS
Status: DISPENSED
Start: 2024-06-26

## (undated) RX ORDER — DEXAMETHASONE SODIUM PHOSPHATE 10 MG/ML
INJECTION, SOLUTION INTRAMUSCULAR; INTRAVENOUS
Status: DISPENSED
Start: 2024-06-26

## (undated) RX ORDER — PROPOFOL 10 MG/ML
INJECTION, EMULSION INTRAVENOUS
Status: DISPENSED
Start: 2024-06-26

## (undated) RX ORDER — LIDOCAINE HYDROCHLORIDE 10 MG/ML
INJECTION, SOLUTION EPIDURAL; INFILTRATION; INTRACAUDAL; PERINEURAL
Status: DISPENSED
Start: 2024-01-02

## (undated) RX ORDER — EPINEPHRINE 1 MG/ML
INJECTION, SOLUTION INTRAMUSCULAR; SUBCUTANEOUS
Status: DISPENSED
Start: 2024-06-26

## (undated) RX ORDER — FENTANYL CITRATE 50 UG/ML
INJECTION, SOLUTION INTRAMUSCULAR; INTRAVENOUS
Status: DISPENSED
Start: 2024-01-02

## (undated) RX ORDER — EPINEPHRINE 1 MG/ML
INJECTION, SOLUTION INTRAMUSCULAR; SUBCUTANEOUS
Status: DISPENSED
Start: 2024-01-02

## (undated) RX ORDER — EPINEPHRINE 1 MG/ML
INJECTION, SOLUTION INTRAMUSCULAR; SUBCUTANEOUS
Status: DISPENSED
Start: 2024-03-27

## (undated) RX ORDER — LIDOCAINE HYDROCHLORIDE 10 MG/ML
INJECTION, SOLUTION EPIDURAL; INFILTRATION; INTRACAUDAL; PERINEURAL
Status: DISPENSED
Start: 2024-06-26

## (undated) RX ORDER — BUPIVACAINE HYDROCHLORIDE AND EPINEPHRINE 2.5; 5 MG/ML; UG/ML
INJECTION, SOLUTION EPIDURAL; INFILTRATION; INTRACAUDAL; PERINEURAL
Status: DISPENSED
Start: 2024-01-02

## (undated) RX ORDER — BUPIVACAINE HYDROCHLORIDE 2.5 MG/ML
INJECTION, SOLUTION EPIDURAL; INFILTRATION; INTRACAUDAL
Status: DISPENSED
Start: 2024-03-27

## (undated) RX ORDER — DIMENHYDRINATE 50 MG/ML
INJECTION, SOLUTION INTRAMUSCULAR; INTRAVENOUS
Status: DISPENSED
Start: 2024-06-26

## (undated) RX ORDER — HYDROMORPHONE HYDROCHLORIDE 1 MG/ML
INJECTION, SOLUTION INTRAMUSCULAR; INTRAVENOUS; SUBCUTANEOUS
Status: DISPENSED
Start: 2024-01-02